# Patient Record
Sex: FEMALE | Race: BLACK OR AFRICAN AMERICAN | Employment: UNEMPLOYED | URBAN - METROPOLITAN AREA
[De-identification: names, ages, dates, MRNs, and addresses within clinical notes are randomized per-mention and may not be internally consistent; named-entity substitution may affect disease eponyms.]

---

## 2017-05-13 ENCOUNTER — APPOINTMENT (OUTPATIENT)
Dept: MRI IMAGING | Age: 37
End: 2017-05-13
Attending: INTERNAL MEDICINE
Payer: SELF-PAY

## 2017-05-13 ENCOUNTER — HOSPITAL ENCOUNTER (OUTPATIENT)
Age: 37
Setting detail: OBSERVATION
Discharge: LEFT BEFORE DISCHARGE | End: 2017-05-13
Attending: EMERGENCY MEDICINE | Admitting: INTERNAL MEDICINE
Payer: SELF-PAY

## 2017-05-13 ENCOUNTER — APPOINTMENT (OUTPATIENT)
Dept: CT IMAGING | Age: 37
End: 2017-05-13
Attending: EMERGENCY MEDICINE
Payer: SELF-PAY

## 2017-05-13 ENCOUNTER — APPOINTMENT (OUTPATIENT)
Dept: GENERAL RADIOLOGY | Age: 37
End: 2017-05-13
Attending: EMERGENCY MEDICINE
Payer: SELF-PAY

## 2017-05-13 VITALS
SYSTOLIC BLOOD PRESSURE: 113 MMHG | TEMPERATURE: 98.1 F | RESPIRATION RATE: 16 BRPM | HEIGHT: 62 IN | HEART RATE: 86 BPM | OXYGEN SATURATION: 98 % | DIASTOLIC BLOOD PRESSURE: 69 MMHG | WEIGHT: 180 LBS | BODY MASS INDEX: 33.13 KG/M2

## 2017-05-13 DIAGNOSIS — H53.9 VISION CHANGES: Primary | ICD-10-CM

## 2017-05-13 DIAGNOSIS — R29.810 FACIAL DROOP: ICD-10-CM

## 2017-05-13 DIAGNOSIS — G45.1 HEMISPHERIC CAROTID ARTERY SYNDROME: ICD-10-CM

## 2017-05-13 LAB
ALBUMIN SERPL BCP-MCNC: 3.6 G/DL (ref 3.5–5)
ALBUMIN/GLOB SERPL: 0.9 {RATIO} (ref 1.1–2.2)
ALP SERPL-CCNC: 70 U/L (ref 45–117)
ALT SERPL-CCNC: 31 U/L (ref 12–78)
AMPHET UR QL SCN: NEGATIVE
ANION GAP BLD CALC-SCNC: 12 MMOL/L (ref 5–15)
APPEARANCE UR: CLEAR
APTT PPP: 26.9 SEC (ref 22.1–32.5)
AST SERPL W P-5'-P-CCNC: 14 U/L (ref 15–37)
BACTERIA URNS QL MICRO: NEGATIVE /HPF
BARBITURATES UR QL SCN: NEGATIVE
BASOPHILS # BLD AUTO: 0 K/UL (ref 0–0.1)
BASOPHILS # BLD: 0 % (ref 0–1)
BENZODIAZ UR QL: NEGATIVE
BILIRUB SERPL-MCNC: 0.2 MG/DL (ref 0.2–1)
BILIRUB UR QL: NEGATIVE
BUN SERPL-MCNC: 6 MG/DL (ref 6–20)
BUN/CREAT SERPL: 8 (ref 12–20)
CALCIUM SERPL-MCNC: 8.9 MG/DL (ref 8.5–10.1)
CANNABINOIDS UR QL SCN: NEGATIVE
CHLORIDE SERPL-SCNC: 108 MMOL/L (ref 97–108)
CHOLEST SERPL-MCNC: 236 MG/DL
CO2 SERPL-SCNC: 22 MMOL/L (ref 21–32)
COCAINE UR QL SCN: NEGATIVE
COLOR UR: NORMAL
CREAT SERPL-MCNC: 0.76 MG/DL (ref 0.55–1.02)
DRUG SCRN COMMENT,DRGCM: NORMAL
EOSINOPHIL # BLD: 0.2 K/UL (ref 0–0.4)
EOSINOPHIL NFR BLD: 4 % (ref 0–7)
EPITH CASTS URNS QL MICRO: NORMAL /LPF
ERYTHROCYTE [DISTWIDTH] IN BLOOD BY AUTOMATED COUNT: 12.6 % (ref 11.5–14.5)
EST. AVERAGE GLUCOSE BLD GHB EST-MCNC: 108 MG/DL
ETHANOL SERPL-MCNC: 113 MG/DL
GLOBULIN SER CALC-MCNC: 4.1 G/DL (ref 2–4)
GLUCOSE BLD STRIP.AUTO-MCNC: 119 MG/DL (ref 65–100)
GLUCOSE SERPL-MCNC: 106 MG/DL (ref 65–100)
GLUCOSE UR STRIP.AUTO-MCNC: NEGATIVE MG/DL
HBA1C MFR BLD: 5.4 % (ref 4.2–6.3)
HCT VFR BLD AUTO: 34.7 % (ref 35–47)
HDLC SERPL-MCNC: 56 MG/DL
HDLC SERPL: 4.2 {RATIO} (ref 0–5)
HGB BLD-MCNC: 11.9 G/DL (ref 11.5–16)
HGB UR QL STRIP: NEGATIVE
INR PPP: 1 (ref 0.9–1.1)
KETONES UR QL STRIP.AUTO: NEGATIVE MG/DL
LDLC SERPL CALC-MCNC: 132.8 MG/DL (ref 0–100)
LEUKOCYTE ESTERASE UR QL STRIP.AUTO: NEGATIVE
LIPID PROFILE,FLP: ABNORMAL
LYMPHOCYTES # BLD AUTO: 24 % (ref 12–49)
LYMPHOCYTES # BLD: 1.4 K/UL (ref 0.8–3.5)
MCH RBC QN AUTO: 30.6 PG (ref 26–34)
MCHC RBC AUTO-ENTMCNC: 34.3 G/DL (ref 30–36.5)
MCV RBC AUTO: 89.2 FL (ref 80–99)
METHADONE UR QL: NEGATIVE
MONOCYTES # BLD: 0.4 K/UL (ref 0–1)
MONOCYTES NFR BLD AUTO: 6 % (ref 5–13)
NEUTS SEG # BLD: 3.9 K/UL (ref 1.8–8)
NEUTS SEG NFR BLD AUTO: 66 % (ref 32–75)
NITRITE UR QL STRIP.AUTO: NEGATIVE
OPIATES UR QL: NEGATIVE
PCP UR QL: NEGATIVE
PH UR STRIP: 5.5 [PH] (ref 5–8)
PLATELET # BLD AUTO: 255 K/UL (ref 150–400)
POTASSIUM SERPL-SCNC: 3.6 MMOL/L (ref 3.5–5.1)
PROT SERPL-MCNC: 7.7 G/DL (ref 6.4–8.2)
PROT UR STRIP-MCNC: NEGATIVE MG/DL
PROTHROMBIN TIME: 10 SEC (ref 9–11.1)
RBC # BLD AUTO: 3.89 M/UL (ref 3.8–5.2)
RBC #/AREA URNS HPF: NORMAL /HPF (ref 0–5)
SERVICE CMNT-IMP: ABNORMAL
SODIUM SERPL-SCNC: 142 MMOL/L (ref 136–145)
SP GR UR REFRACTOMETRY: <1.005 (ref 1–1.03)
THERAPEUTIC RANGE,PTTT: NORMAL SECS (ref 58–77)
TRIGL SERPL-MCNC: 236 MG/DL (ref ?–150)
UA: UC IF INDICATED,UAUC: NORMAL
UROBILINOGEN UR QL STRIP.AUTO: 0.2 EU/DL (ref 0.2–1)
VLDLC SERPL CALC-MCNC: 47.2 MG/DL
WBC # BLD AUTO: 6 K/UL (ref 3.6–11)
WBC URNS QL MICRO: NORMAL /HPF (ref 0–4)

## 2017-05-13 PROCEDURE — 85730 THROMBOPLASTIN TIME PARTIAL: CPT | Performed by: EMERGENCY MEDICINE

## 2017-05-13 PROCEDURE — 80307 DRUG TEST PRSMV CHEM ANLYZR: CPT | Performed by: INTERNAL MEDICINE

## 2017-05-13 PROCEDURE — 81001 URINALYSIS AUTO W/SCOPE: CPT | Performed by: EMERGENCY MEDICINE

## 2017-05-13 PROCEDURE — G8979 MOBILITY GOAL STATUS: HCPCS

## 2017-05-13 PROCEDURE — 97166 OT EVAL MOD COMPLEX 45 MIN: CPT

## 2017-05-13 PROCEDURE — 36415 COLL VENOUS BLD VENIPUNCTURE: CPT | Performed by: EMERGENCY MEDICINE

## 2017-05-13 PROCEDURE — 74011250637 HC RX REV CODE- 250/637: Performed by: PSYCHIATRY & NEUROLOGY

## 2017-05-13 PROCEDURE — 82962 GLUCOSE BLOOD TEST: CPT

## 2017-05-13 PROCEDURE — 70551 MRI BRAIN STEM W/O DYE: CPT

## 2017-05-13 PROCEDURE — 99285 EMERGENCY DEPT VISIT HI MDM: CPT

## 2017-05-13 PROCEDURE — 71010 XR CHEST PORT: CPT

## 2017-05-13 PROCEDURE — G8987 SELF CARE CURRENT STATUS: HCPCS

## 2017-05-13 PROCEDURE — 99218 HC RM OBSERVATION: CPT

## 2017-05-13 PROCEDURE — 80053 COMPREHEN METABOLIC PANEL: CPT | Performed by: EMERGENCY MEDICINE

## 2017-05-13 PROCEDURE — 70450 CT HEAD/BRAIN W/O DYE: CPT

## 2017-05-13 PROCEDURE — 74011250636 HC RX REV CODE- 250/636: Performed by: INTERNAL MEDICINE

## 2017-05-13 PROCEDURE — 70544 MR ANGIOGRAPHY HEAD W/O DYE: CPT

## 2017-05-13 PROCEDURE — 70547 MR ANGIOGRAPHY NECK W/O DYE: CPT

## 2017-05-13 PROCEDURE — 93005 ELECTROCARDIOGRAM TRACING: CPT

## 2017-05-13 PROCEDURE — 85610 PROTHROMBIN TIME: CPT | Performed by: EMERGENCY MEDICINE

## 2017-05-13 PROCEDURE — 83036 HEMOGLOBIN GLYCOSYLATED A1C: CPT | Performed by: INTERNAL MEDICINE

## 2017-05-13 PROCEDURE — 97530 THERAPEUTIC ACTIVITIES: CPT

## 2017-05-13 PROCEDURE — 97162 PT EVAL MOD COMPLEX 30 MIN: CPT

## 2017-05-13 PROCEDURE — 74011250637 HC RX REV CODE- 250/637: Performed by: INTERNAL MEDICINE

## 2017-05-13 PROCEDURE — G8988 SELF CARE GOAL STATUS: HCPCS

## 2017-05-13 PROCEDURE — 80061 LIPID PANEL: CPT | Performed by: INTERNAL MEDICINE

## 2017-05-13 PROCEDURE — G8978 MOBILITY CURRENT STATUS: HCPCS

## 2017-05-13 PROCEDURE — 85025 COMPLETE CBC W/AUTO DIFF WBC: CPT | Performed by: EMERGENCY MEDICINE

## 2017-05-13 PROCEDURE — 97116 GAIT TRAINING THERAPY: CPT

## 2017-05-13 RX ORDER — SODIUM CHLORIDE 0.9 % (FLUSH) 0.9 %
5-10 SYRINGE (ML) INJECTION EVERY 8 HOURS
Status: DISCONTINUED | OUTPATIENT
Start: 2017-05-13 | End: 2017-05-13 | Stop reason: HOSPADM

## 2017-05-13 RX ORDER — BISACODYL 5 MG
5 TABLET, DELAYED RELEASE (ENTERIC COATED) ORAL DAILY PRN
Status: DISCONTINUED | OUTPATIENT
Start: 2017-05-13 | End: 2017-05-13 | Stop reason: HOSPADM

## 2017-05-13 RX ORDER — LOVASTATIN 10 MG/1
10 TABLET ORAL
Qty: 30 TAB | Refills: 1 | Status: SHIPPED | OUTPATIENT
Start: 2017-05-13 | End: 2017-08-14 | Stop reason: CLARIF

## 2017-05-13 RX ORDER — BUTALBITAL, ACETAMINOPHEN AND CAFFEINE 50; 325; 40 MG/1; MG/1; MG/1
1 TABLET ORAL
Status: DISCONTINUED | OUTPATIENT
Start: 2017-05-13 | End: 2017-05-13 | Stop reason: HOSPADM

## 2017-05-13 RX ORDER — LISINOPRIL 10 MG/1
10 TABLET ORAL DAILY
Qty: 30 TAB | Refills: 1 | Status: SHIPPED | OUTPATIENT
Start: 2017-05-13 | End: 2017-08-14 | Stop reason: CLARIF

## 2017-05-13 RX ORDER — ACETAMINOPHEN 650 MG/1
650 SUPPOSITORY RECTAL
Status: DISCONTINUED | OUTPATIENT
Start: 2017-05-13 | End: 2017-05-13 | Stop reason: HOSPADM

## 2017-05-13 RX ORDER — IBUPROFEN 600 MG/1
600 TABLET ORAL
Status: DISCONTINUED | OUTPATIENT
Start: 2017-05-13 | End: 2017-05-13

## 2017-05-13 RX ORDER — LABETALOL HYDROCHLORIDE 5 MG/ML
10 INJECTION, SOLUTION INTRAVENOUS
Status: DISCONTINUED | OUTPATIENT
Start: 2017-05-13 | End: 2017-05-13 | Stop reason: HOSPADM

## 2017-05-13 RX ORDER — ATORVASTATIN CALCIUM 20 MG/1
20 TABLET, FILM COATED ORAL
Status: DISCONTINUED | OUTPATIENT
Start: 2017-05-13 | End: 2017-05-13 | Stop reason: HOSPADM

## 2017-05-13 RX ORDER — ONDANSETRON 2 MG/ML
4 INJECTION INTRAMUSCULAR; INTRAVENOUS
Status: DISCONTINUED | OUTPATIENT
Start: 2017-05-13 | End: 2017-05-13 | Stop reason: HOSPADM

## 2017-05-13 RX ORDER — GUAIFENESIN 100 MG/5ML
81 LIQUID (ML) ORAL DAILY
Status: DISCONTINUED | OUTPATIENT
Start: 2017-05-13 | End: 2017-05-13 | Stop reason: HOSPADM

## 2017-05-13 RX ORDER — ACETAMINOPHEN 325 MG/1
650 TABLET ORAL
Status: DISCONTINUED | OUTPATIENT
Start: 2017-05-13 | End: 2017-05-13 | Stop reason: HOSPADM

## 2017-05-13 RX ORDER — SODIUM CHLORIDE 0.9 % (FLUSH) 0.9 %
5-10 SYRINGE (ML) INJECTION AS NEEDED
Status: DISCONTINUED | OUTPATIENT
Start: 2017-05-13 | End: 2017-05-13 | Stop reason: HOSPADM

## 2017-05-13 RX ORDER — ENOXAPARIN SODIUM 100 MG/ML
40 INJECTION SUBCUTANEOUS DAILY
Status: DISCONTINUED | OUTPATIENT
Start: 2017-05-13 | End: 2017-05-13 | Stop reason: HOSPADM

## 2017-05-13 RX ADMIN — ENOXAPARIN SODIUM 40 MG: 40 INJECTION SUBCUTANEOUS at 14:02

## 2017-05-13 RX ADMIN — BUTALBITAL, ACETAMINOPHEN AND CAFFEINE 1 TABLET: 50; 325; 40 TABLET ORAL at 14:01

## 2017-05-13 RX ADMIN — Medication 10 ML: at 14:01

## 2017-05-13 RX ADMIN — ASPIRIN 81 MG 81 MG: 81 TABLET ORAL at 16:52

## 2017-05-13 RX ADMIN — BUTALBITAL, ACETAMINOPHEN AND CAFFEINE 1 TABLET: 50; 325; 40 TABLET ORAL at 02:56

## 2017-05-13 NOTE — H&P
Hospitalist Admission Note    NAME: Michaela Maguire   :  1980   MRN:  [id number]     Date/Time:  2017 1:47 AM    Patient PCP: PROVIDER UNKNOWN  ________________________________________________________________________    My assessment of this patient's clinical condition and my plan of care is as follows. Assessment / Plan:  Complex migraine vs TIA in setting of drinking wine tonight:  - CT head normal noncontrast head CT  - check MRI/MRA head and neck  - feel echo can be deferred if MRI is normal as this is likely related to EtOH and HA  - check lipids in AM  - Pt with NSAID allergy, will await neuro recommendations regarding necessity of antiplatelet agent  - neuro consulted  - prn fioricet for HA tonight (this is her main complaint)  - PT/OT  Hypertension, benign/essential:  - has been off HCTZ since January  - will not restart med, prn labetalol only per protocol tonight  Sarcoidosis of lymph nodes:  Clear lungs on imaging, but does have documented LAD on CT scans. No hypercalcemia. - CXR negative tonight, but prior CTs with LAD  - off prednisone since January  - feel she needs to return to rheumatology to consider further treatment (current barrier is insurance but she is in 90 days waiting period with new job and this will resolve soon)  Obesity    Code Status: Full  Surrogate Decision Maker:   DVT Prophylaxis: lovenox        Subjective:   CHIEF COMPLAINT:  L facial tightness, tingling, HA    HISTORY OF PRESENT ILLNESS:     Shiela Livingston is a 29 y.o.  female who presents with above. Pt very tearful in the ER, endorses overall poor health for years. She has seen many doctors, eventually noted to have diffuse LAD and had groin biopsies with granulomas so was felt to have sarcoidosis of lymph nodes. She also has nonhealing tatoos on her body.   She was started on prednisone last year and took it for about 6 months, but lost her insurance so stopped all meds in January. She endorses headaches for the last week off and on which she attributed to being off her blood pressure medication. Tonight her HA was severe over her entire frontal area across the top of her scalp and to her posterior head. She was drinking and playing cards with family (~2 glasses of wine). She felt like she needed to urinate a lot and kept going to the bathroom. Finally when she was in the bathroom her L face began feeling tingly. She thinks she had a slight L facial droop and it felt like there was cotton her her L mouth. Her L arm was slightly week with a sensation like being squeezed by a rubber band. No LE weakness. No CP. She says she is SOB all the time from her sarcoid. She is not sure if she had recent fevers. No URI sx. She has both diarrhea and constipation \"all the time. \"  Her sx have improved since coming to the ER. We were asked to admit for work up and evaluation of the above problems. Past Medical History:   Diagnosis Date    Anemia     Headache     Hypertension     Ill-defined condition     sarcoid    PUD (peptic ulcer disease) 2002    resolved        Past Surgical History:   Procedure Laterality Date    HX GYN      Left ovary and fallopian tube removed, D&C, tubal ligation    HX OTHER SURGICAL  2014    Endometrial Ablation, Dr. Chelsea Etienne HX OTHER SURGICAL  8/2015    lymph node removal    HX OTHER SURGICAL Left 8/27/15    excision of left groin lymph node.     HX TUBAL LIGATION  2005    Dr. Parmjit Jon       Social History   Substance Use Topics    Smoking status: Current Every Day Smoker     Packs/day: 0.50     Years: 15.00    Smokeless tobacco: Not on file    Alcohol use Yes      Comment: occasionally        Family History   Problem Relation Age of Onset    Hypertension Mother     Cancer Maternal Grandfather      stomach     Allergies   Allergen Reactions    Latex Hives    Motrin [Ibuprofen] Hives     Allergic just to the coating of brand name Motrin, ok to take generic    Tramadol Rash        Prior to Admission medications    Medication Sig Start Date End Date Taking? Authorizing Provider   predniSONE (DELTASONE) 20 mg tablet Use 3 pills once daily x 3 days, then 2 1/2 pills once daily x 3 days, then 2 pills daily x 3 days, then 1 pill daily x 3 days, then 1/2 pill daily x 3 days. 10/14/16   YANA De Dios   hydrOXYzine (ATARAX) 50 mg tablet Take 1 Tab by mouth every six (6) hours as needed for Itching for up to 20 doses. 10/14/16   YANA De Dios   HYDROcodone-acetaminophen (NORCO) 5-325 mg per tablet Take 1 Tab by mouth every four (4) hours as needed for Pain. Max Daily Amount: 6 Tabs. 9/17/15   Dennis Bermudez MD   ibuprofen (MOTRIN) 600 mg tablet Take 1 Tab by mouth every six (6) hours as needed for Pain. 9/17/15   Dennis Bermudez MD   oxyCODONE-acetaminophen (PERCOCET) 5-325 mg per tablet Take 1-2 Tabs by mouth every four (4) hours as needed for Pain. Max Daily Amount: 12 Tabs. 9/13/15   Berna Lackey MD       REVIEW OF SYSTEMS:     I am not able to complete the review of systems because:    The patient is intubated and sedated    The patient has altered mental status due to his acute medical problems    The patient has baseline aphasia from prior stroke(s)    The patient has baseline dementia and is not reliable historian    The patient is in acute medical distress and unable to provide information           Total of 12 systems reviewed as follows:       POSITIVE= underlined text  Negative = text not underlined  General:  fever, chills, sweats, generalized weakness, weight loss/gain,      loss of appetite   Eyes:    blurred vision, eye pain, loss of vision, double vision  ENT:    rhinorrhea, pharyngitis   Respiratory:   cough, sputum production, SOB, PANDYA, wheezing, pleuritic pain   Cardiology:   chest pain, palpitations, orthopnea, PND, edema, syncope   Gastrointestinal:  abdominal pain , N/V, diarrhea, dysphagia, constipation, bleeding   Genitourinary:  frequency, urgency, dysuria, hematuria, incontinence   Muskuloskeletal :  arthralgia, myalgia, back pain  Hematology:  easy bruising, nose or gum bleeding, lymphadenopathy   Dermatological: rash, ulceration, pruritis, color change / jaundice  Endocrine:   hot flashes or polydipsia   Neurological:  headache, dizziness, confusion, focal weakness, paresthesia,     Speech difficulties, memory loss, gait difficulty  Psychological: Feelings of anxiety, depression, agitation    Objective:   VITALS:    Visit Vitals    /72    Pulse 95    Resp 17    Ht 5' 2\" (1.575 m)    Wt 81.6 kg (180 lb)    SpO2 99%    BMI 32.92 kg/m2       PHYSICAL EXAM:    General:    Alert, cooperative, no distress, appears stated age. HEENT: Atraumatic, anicteric sclerae, pink conjunctivae     No oral ulcers, mucosa moist, throat clear, dentition fair  Neck:  Supple, symmetrical,  thyroid: non tender  Lungs:   Clear to auscultation bilaterally. No Wheezing or Rhonchi. No rales. Chest wall:  No tenderness  No Accessory muscle use. Heart:   Regular  rhythm,  No  murmur   No edema  Abdomen:   Soft, non-tender. Not distended. Bowel sounds normal  Extremities: No cyanosis. No clubbing,      Skin turgor normal, Capillary refill normal, Radial dial pulse 2+  Skin:     Not pale. Not Jaundiced  No rashes   Psych:  Some insight. Not depressed. Not anxious or agitated. Tearful, frustrated. Neurologic: EOMs intact. No facial asymmetry. No aphasia or slurred speech. Symmetrical strength, Sensation grossly intact.  Alert and oriented X 4.     _______________________________________________________________________  Care Plan discussed with:    Comments   Patient x    Family  x multiple   RN x    Care Manager                    Consultant:      _______________________________________________________________________  Expected  Disposition:   Home with Family x   HH/PT/OT/RN    SNF/LTC    [facility] ________________________________________________________________________  TOTAL TIME:  60 Minutes    Critical Care Provided     Minutes non procedure based      Comments    x Reviewed previous records   >50% of visit spent in counseling and coordination of care x Discussion with patient and/or family and questions answered       ________________________________________________________________________  Signed: Ronald Hendricks MD    Procedures: see electronic medical records for all procedures/Xrays and details which were not copied into this note but were reviewed prior to creation of Plan. LAB DATA REVIEWED:    Recent Results (from the past 24 hour(s))   GLUCOSE, POC    Collection Time: 05/13/17 12:51 AM   Result Value Ref Range    Glucose (POC) 119 (H) 65 - 100 mg/dL    Performed by Silvia Gillespie    CBC WITH AUTOMATED DIFF    Collection Time: 05/13/17  1:09 AM   Result Value Ref Range    WBC 6.0 3.6 - 11.0 K/uL    RBC 3.89 3.80 - 5.20 M/uL    HGB 11.9 11.5 - 16.0 g/dL    HCT 34.7 (L) 35.0 - 47.0 %    MCV 89.2 80.0 - 99.0 FL    MCH 30.6 26.0 - 34.0 PG    MCHC 34.3 30.0 - 36.5 g/dL    RDW 12.6 11.5 - 14.5 %    PLATELET 463 039 - 474 K/uL    NEUTROPHILS 66 32 - 75 %    LYMPHOCYTES 24 12 - 49 %    MONOCYTES 6 5 - 13 %    EOSINOPHILS 4 0 - 7 %    BASOPHILS 0 0 - 1 %    ABS. NEUTROPHILS 3.9 1.8 - 8.0 K/UL    ABS. LYMPHOCYTES 1.4 0.8 - 3.5 K/UL    ABS. MONOCYTES 0.4 0.0 - 1.0 K/UL    ABS. EOSINOPHILS 0.2 0.0 - 0.4 K/UL    ABS.  BASOPHILS 0.0 0.0 - 0.1 K/UL   PROTHROMBIN TIME + INR    Collection Time: 05/13/17  1:09 AM   Result Value Ref Range    INR 1.0 0.9 - 1.1      Prothrombin time 10.0 9.0 - 04.7 sec   METABOLIC PANEL, COMPREHENSIVE    Collection Time: 05/13/17  1:09 AM   Result Value Ref Range    Sodium 142 136 - 145 mmol/L    Potassium 3.6 3.5 - 5.1 mmol/L    Chloride 108 97 - 108 mmol/L    CO2 22 21 - 32 mmol/L    Anion gap 12 5 - 15 mmol/L    Glucose 106 (H) 65 - 100 mg/dL    BUN 6 6 - 20 MG/DL    Creatinine 0. 76 0.55 - 1.02 MG/DL    BUN/Creatinine ratio 8 (L) 12 - 20      GFR est AA >60 >60 ml/min/1.73m2    GFR est non-AA >60 >60 ml/min/1.73m2    Calcium 8.9 8.5 - 10.1 MG/DL    Bilirubin, total 0.2 0.2 - 1.0 MG/DL    ALT (SGPT) 31 12 - 78 U/L    AST (SGOT) 14 (L) 15 - 37 U/L    Alk.  phosphatase 70 45 - 117 U/L    Protein, total 7.7 6.4 - 8.2 g/dL    Albumin 3.6 3.5 - 5.0 g/dL    Globulin 4.1 (H) 2.0 - 4.0 g/dL    A-G Ratio 0.9 (L) 1.1 - 2.2     PTT    Collection Time: 05/13/17  1:09 AM   Result Value Ref Range    aPTT 26.9 22.1 - 32.5 sec    aPTT, therapeutic range     58.0 - 77.0 SECS

## 2017-05-13 NOTE — PROGRESS NOTES
Problem: Mobility Impaired (Adult and Pediatric)  Goal: *Acute Goals and Plan of Care (Insert Text)  Physical Therapy Goals  Initiated 5/13/2017  1. Patient will move from supine to sit and sit to supine , scoot up and down and roll side to side in bed with independence within 7 day(s). 2. Patient will transfer from bed to chair and chair to bed with independence using the least restrictive device within 7 day(s). 3. Patient will perform sit to stand with independence within 7 day(s). 4. Patient will ambulate with independence for 200 feet with the least restrictive device within 7 day(s). 5. Patient will ascend/descend 5 stairs with 0 handrail(s) with modified independence within 7 day(s). 6. Patient will improve NJ balance test score by 7 points within 7 days as to demonstrate improved standing balance. PHYSICAL THERAPY EVALUATION- NEURO POPULATION     Patient: Barrett Sam (89 y.o. female)  Date: 5/13/2017  Primary Diagnosis: TIA        Precautions:  Fall      ASSESSMENT :  Based on the objective data described below, the patient presents with impaired functional mobility secondary to LUE/LE weakness, L facial droop with L eyelid droop, impaired standing and sitting balance, impaired gait, and decreased activity tolerance following admission for TIA. CT negative and MRI to be completed this date. Pt is independent for mobility at baseline and lives with family in one level home. Pt received supine in bed with family present and agreeable to PT evaluation. Pt cleared by nursing for mobility. Pt performed bed mobility with SBA, requiring additional time to complete transfers. Pt performed transfers with CGA. Pt scored 32/56 on NJ balance test indicating moderate fall risk. Pt mobilized to/from the bathroom with OT and then ambulated additional 80' with CGA, demonstrating slow gait speed, mild path deviations, decreased arm swing, and increased trunk sway. Pt required occasional min A for mild LOB. Transport present to take patient for testing, therefore evaluation terminated and pt assisted onto stretcher. Pt will require BEFAST education during next therapy session as evaluation terminated early. Pt was left with all needs met, nursing informed, and transport present. Recommend patient discharge to inpatient rehab vs OP neuro PT pending progress in acute PT. Patient will continue to benefit from skilled acute PT to address impairments listed above. Patient will benefit from skilled intervention to address the above impairments. Patients rehabilitation potential is considered to be Good  Factors which may influence rehabilitation potential include:   [ ]           None noted  [ ]           Mental ability/status  [X]           Medical condition  [ ]           Home/family situation and support systems  [ ]           Safety awareness  [ ]           Pain tolerance/management  [ ]           Other:        PLAN :  Recommendations and Planned Interventions:  [X]             Bed Mobility Training             [X]      Neuromuscular Re-Education  [X]             Transfer Training                   [ ]      Orthotic/Prosthetic Training  [X]             Gait Training                         [ ]      Modalities  [X]             Therapeutic Exercises           [ ]      Edema Management/Control  [X]             Therapeutic Activities            [X]      Patient and Family Training/Education  [ ]             Other (comment):  Frequency/Duration: Patient will be followed by physical therapy 5 times a week to address goals. Discharge Recommendations: Inpatient Rehab vs. Outpatient Neuro PT, To Be Determined  Further Equipment Recommendations for Discharge: TBD based on progress in acute PT       SUBJECTIVE:   Patient stated That's my daughter.       OBJECTIVE DATA SUMMARY:   HISTORY:    Past Medical History:   Diagnosis Date    Anemia      Headache      Hypertension      Obesity      PUD (peptic ulcer disease) 2002 resolved    Sarcoidosis of lymph nodes ([facility] [address]       Past Surgical History:   Procedure Laterality Date    HX GYN         Left ovary and fallopian tube removed, D&C, tubal ligation    HX OTHER SURGICAL   2014     Endometrial Ablation, Dr. Mino Do HX OTHER SURGICAL   8/2015     lymph node removal    HX OTHER SURGICAL Left 8/27/15     excision of left groin lymph node.  HX TUBAL LIGATION   2005     Dr. Eduardo Jon     Prior Level of Function/Home Situation: pt is independent for mobility at baseline; lives with children and fiance; drives; works at [employer] job; h/o TIA when she was 34 yo; reports one large GLF ~ 1 year ago, falling off of her porch and hitting her head (pt reporting having seizure x 1 following this accident)  Personal factors and/or comorbidities impacting plan of care: HTN; h/o TIA     Home Situation  Home Environment: Private residence  # Steps to Enter: 5  Rails to Enter: No  Wheelchair Ramp: No  One/Two Story Residence: One story  Living Alone: No  Support Systems: Child(bibi), Spouse/Significant Other/Partner  Patient Expects to be Discharged to[de-identified] Unknown  Current DME Used/Available at Home: Grab bars  Tub or Shower Type: Shower     EXAMINATION/PRESENTATION/DECISION MAKING:   Critical Behavior:  Neurologic State: Alert  Orientation Level: Oriented X4  Cognition: Appropriate safety awareness, Follows commands     Hearing:   Auditory  Auditory Impairment: None  Skin:  Intact  Edema: None  Range Of Motion:  AROM: Generally decreased, functional (LUE/LE, otherwise WFLs)           PROM: Within functional limits           Strength:    Strength: Generally decreased, functional (LUE/LE; LLE grossly 4-/5, otherwise WFLs)                    Tone & Sensation:   Tone: Normal              Sensation: Impaired (Lateral L calf, L foot)               Coordination:  Coordination: Within functional limits  Vision:      Functional Mobility:  Bed Mobility:  Rolling: Stand-by asssistance; Additional time  Supine to Sit: Stand-by asssistance; Additional time     Scooting: Stand-by asssistance; Additional time  Transfers:  Sit to Stand: Contact guard assistance  Stand to Sit: Contact guard assistance                       Balance:   Sitting: Impaired  Sitting - Static: Good (unsupported)  Sitting - Dynamic: Fair (occasional)  Standing: Impaired  Standing - Static: Fair  Standing - Dynamic : Fair  Ambulation/Gait Training:  Distance (ft): 85 Feet (ft)  Assistive Device: Gait belt  Ambulation - Level of Assistance: Contact guard assistance;Assist x1;Additional time (min A for mild LOB)     Gait Description (WDL): Exceptions to WDL  Gait Abnormalities: Decreased step clearance; Path deviations;Trunk sway increased; Antalgic        Base of Support: Widened     Speed/Jamee: Pace decreased (<100 feet/min)  Step Length: Left shortened;Right shortened     Functional Measure  Walters Balance Test:      Sitting to Standin  Standing Unsupported: 3  Sitting with Back Unsupported: 3  Standing to Sittin  Transfers: 1  Standing Unsupported with Eyes Closed: 3  Standing Unsupported with Feet Together: 3  Reach Forward with Outstretched Arm: 2   Object: 3  Turn to Look Over Shoulders: 3  Turn 360 Degrees: 1  Alternate Foot on Step/Stool: 2  Standing Unsupported One Foot in Front: 3  Stand on One Leg: 3  Total: 32             56=Maximum possible score;   0-20=High fall risk  21-40=Moderate fall risk   41-56=Low fall risk      Walters Balance Test and G-code impairment scale:  Percentage of Impairment CH     0%    CI     1-19% CJ     20-39% CK     40-59% CL     60-79% CM     80-99% CN      100%   Walters   Score 0-56 56 45-55 34-44 23-33 12-22 1-11 0         G codes: In compliance with CMSs Claims Based Outcome Reporting, the following G-code set was chosen for this patient based on their primary functional limitation being treated:      The outcome measure chosen to determine the severity of the functional limitation was the NJ with a score of 32/56 which was correlated with the impairment scale. · Mobility - Walking and Moving Around:               - CURRENT STATUS:    CK - 40%-59% impaired, limited or restricted               - GOAL STATUS:           CI - 1%-19% impaired, limited or restricted               - D/C STATUS:                       ---------------To be determined---------------       Physical Therapy Evaluation Charge Determination   History Examination Presentation Decision-Making   MEDIUM  Complexity : 1-2 comorbidities / personal factors will impact the outcome/ POC  HIGH Complexity : 4+ Standardized tests and measures addressing body structure, function, activity limitation and / or participation in recreation  MEDIUM Complexity : Evolving with changing characteristics  Other outcome measures NJ  MEDIUM      Based on the above components, the patient evaluation is determined to be of the following complexity level: MEDIUM     Pain:  Pain Scale 1: Numeric (0 - 10)  Pain Intensity 1: 0  Pain Location 1: Head; Eye           Activity Tolerance:   Fair/good - pt with complaints of fatigue  After treatment:   [ ]     Patient left in no apparent distress sitting up in chair  [X]     Patient left in no apparent distress in bed - stretcher  [X]     Call bell left within reach  [X]     Nursing notified  [X]     Caregiver present  [ ]     Bed alarm activated      COMMUNICATION/EDUCATION:   The patients plan of care was discussed with: Physical Therapist, Occupational Therapist and Registered Nurse. Patient was educated regarding Her deficit(s) of impaired balance, left sided weakness, impaired gait as this relates to Her diagnosis of TIA vs CVA. She demonstrated Good understanding as evidenced by verbalizing understanding. Patient and/or family was NOT verbally educated on the BE FAST acronym for signs/symptoms of CVA and TIA.  BE FAST was written on patient's communication board  for visual education and reinforcement. [X]  Fall prevention education was provided and the patient/caregiver indicated understanding. [X]  Patient/family have participated as able in goal setting and plan of care. [X]  Patient/family agree to work toward stated goals and plan of care. [ ]  Patient understands intent and goals of therapy, but is neutral about his/her participation. [ ]  Patient is unable to participate in goal setting and plan of care.      Thank you for this referral.  Tyesha Ross, PT, DPT   Time Calculation: 26 mins

## 2017-05-13 NOTE — PROGRESS NOTES
Pt wanted to leave AMA. I am concerned given her risk factors for stroke that she needs meds prior to D/C. Discussed she could have had a TIA. Discussed she needs BP and cholesterol meds. Rx given for both of these.  Pt instructed to f/u with PCP as soon as possible once reestablishes insurance    Jesus Taylor MD  5/13/2017  5:56 PM

## 2017-05-13 NOTE — ROUTINE PROCESS
TRANSFER - OUT REPORT:    Verbal report given to Adriana Emerson RN (name) on Doryohan Leak  being transferred to neuro (unit) for routine progression of care       Report consisted of patients Situation, Background, Assessment and   Recommendations(SBAR). Information from the following report(s) SBAR, Kardex, ED Summary, Intake/Output, MAR and Recent Results was reviewed with the receiving nurse. Lines:   Peripheral IV 05/13/17 Right Antecubital (Active)   Site Assessment Clean, dry, & intact 5/13/2017  1:21 AM   Phlebitis Assessment 0 5/13/2017  1:21 AM   Infiltration Assessment 0 5/13/2017  1:21 AM   Dressing Status Clean, dry, & intact 5/13/2017  1:21 AM   Dressing Type Transparent;Tape 5/13/2017  1:21 AM   Hub Color/Line Status Pink 5/13/2017  1:21 AM        Opportunity for questions and clarification was provided.       Patient transported with:   [employer]

## 2017-05-13 NOTE — PROGRESS NOTES
No surgery found *  Bedside and Verbal shift change report given to , (oncoming nurse) by Daisha Villalobos R.N (offgoing nurse). Report included the following information SBAR, Kardex, ED Summary, Intake/Output, MAR, Recent Results and Cardiac Rhythm NSR.     Zone Phone: [phone]        Significant changes during shift:             Patient Information     Inocente Scott  29 y.o.  5/13/2017 12:46 AM by Tony Persaud MD. Inocente Scott was admitted from Home     Problem List          Patient Active Problem List     Diagnosis Date Noted    Sarcoidosis ([facility] [address] 09/17/2015    Adenopathy 08/21/2015           Past Medical History:   Diagnosis Date    Anemia      Headache      Hypertension      Obesity      PUD (peptic ulcer disease) 2002     resolved    Sarcoidosis of lymph nodes ([facility] [address]              Core Measures:     CVA: Yes Yes  CHF:No No  PNA:No No           Activity Status:     OOB to Chair Yes  Ambulated this shift Yes   Bed Rest No     Supplemental O2: (If Applicable)     NC No  NRB No  Venti-mask No  On Liters/min        LINES AND DRAINS:     PIV- 20G RAC-SL.     DVT prophylaxis:     DVT prophylaxis Med- Yes  DVT prophylaxis SCD or ERIC- No      Wounds: (If Applicable)     Wounds- No     Location      Patient Safety:     Falls Score Total Score: 1  Safety Level_______  Bed Alarm On? No  Sitter?  No     Plan for upcoming shift:, Neurology consult           Discharge Plan: Yes after stroke w/u      Active Consults:  IP CONSULT TO NEUROLOGY

## 2017-05-13 NOTE — LETTER
5/13/2017 5:53 PM 
 
Ms. Marck Villareal [facility] To Whom It May Concern: 
 
Marck Villareal was hospitalized at [facility] from 5/12/17 through 5/13/17. She was diagnosed with a transient ischemic attack or TIA. If there are questions or concerns please have the patient contact our office. Sincerely, Nancy Harrell MD

## 2017-05-13 NOTE — PROGRESS NOTES
Hospitalist Progress Note    NAME: Radha Renae   :  1980   MRN:  [id number]       Interim Hospital Summary: 29 y.o. female whom presented on 2017 with      Assessment / Plan:  Complex migraine vs TIA in setting of drinking wine tonight:  - CT head normal noncontrast head CT  - MRI/MRA head and neck pending  - feel echo can be deferred if MRI is normal as this is likely related to EtOH and HA  - Lipids,  236, , HDL 56, .8, start lipitor  - Pt with NSAID allergy, will await neuro recommendations regarding necessity of antiplatelet agent  - neuro consulted  - prn fioricet for HA tonight (this is her main complaint)  - PT/OT  Hypertension, benign/essential:  - has been off HCTZ since January  - will not restart med, prn labetalol only per protocol tonight  Sarcoidosis of lymph nodes: Clear lungs on imaging, but does have documented LAD on CT scans. No hypercalcemia. - CXR negative tonight, but prior CTs with LAD  - off prednisone since January  - feel she needs to return to rheumatology to consider further treatment (current barrier is insurance but she is in 90 days waiting period with new job and this will resolve soon)  Obesity     Code Status: Full  Surrogate Decision Maker:   DVT Prophylaxis: lovenox      Body mass index is 32.92 kg/(m^2). Subjective:     Chief Complaint / Reason for Physician Visit  \"headache is better, lt sided weakness and numbness\". Discussed with RN events overnight. Review of Systems:  Symptom Y/N Comments  Symptom Y/N Comments   Fever/Chills n   Chest Pain n    Poor Appetite y   Edema n    Cough n   Abdominal Pain n    Sputum n   Joint Pain n    SOB/PANDYA n   Pruritis/Rash     Nausea/vomit y Nausea  Tolerating PT/OT     Diarrhea n   Tolerating Diet     Constipation n   Other       Could NOT obtain due to:      Objective:     VITALS:   Last 24hrs VS reviewed since prior progress note.  Most recent are:  Patient Vitals for the past 24 hrs: Temp Pulse Resp BP SpO2   05/13/17 0740 98.7 °F (37.1 °C) 85 16 100/55 -   05/13/17 0414 98.1 °F (36.7 °C) 76 16 118/72 97 %   05/13/17 0300 - 86 15 114/84 98 %   05/13/17 0200 - 86 21 116/70 97 %   05/13/17 0119 - 95 17 - 99 %   05/13/17 0113 - 89 23 119/72 -   05/13/17 0111 - 90 16 119/72 -       Intake/Output Summary (Last 24 hours) at 05/13/17 1208  Last data filed at 05/13/17 1008   Gross per 24 hour   Intake              240 ml   Output                0 ml   Net              240 ml        PHYSICAL EXAM:  General: WD, WN. Alert, cooperative, no acute distress    EENT:  EOMI. Anicteric sclerae. MMM  Resp:  CTA bilaterally, no wheezing or rales. No accessory muscle use  CV:  Regular  rhythm,  No edema  GI:  Soft, Non distended, Non tender.  +Bowel sounds  Neurologic:  Alert and oriented X 3, normal speech, weakness lt side-side, lt facial droop  Psych:   Good insight. Not anxious nor agitated  Skin:  No rashes. No jaundice    Reviewed most current lab test results and cultures  YES  Reviewed most current radiology test results   YES  Review and summation of old records today    NO  Reviewed patient's current orders and MAR    YES  PMH/SH reviewed - no change compared to H&P  ________________________________________________________________________  Care Plan discussed with:    Comments   Patient x    Family  x    RN x    Care Manager     Consultant                        Multidiciplinary team rounds were held today with , nursing, pharmacist and clinical coordinator. Patient's plan of care was discussed; medications were reviewed and discharge planning was addressed.      ________________________________________________________________________  Total NON critical care TIME:  30   Minutes    Total CRITICAL CARE TIME Spent:   Minutes non procedure based      Comments   >50% of visit spent in counseling and coordination of care ________________________________________________________________________  Gemini Pillai MD     Procedures: see electronic medical records for all procedures/Xrays and details which were not copied into this note but were reviewed prior to creation of Plan. LABS:  I reviewed today's most current labs and imaging studies.   Pertinent labs include:  Recent Labs      05/13/17 0109   WBC  6.0   HGB  11.9   HCT  34.7*   PLT  255     Recent Labs      05/13/17 0109   NA  142   K  3.6   CL  108   CO2  22   GLU  106*   BUN  6   CREA  0.76   CA  8.9   ALB  3.6   TBILI  0.2   SGOT  14*   ALT  31   INR  1.0       Signed: Gemini Pillai MD

## 2017-05-13 NOTE — PROGRESS NOTES
Problem: Self Care Deficits Care Plan (Adult)  Goal: *Acute Goals and Plan of Care (Insert Text)  Occupational Therapy Goals  Initiated 5/13/2017  1. Patient will perform upper body dressing with independence within 7 day(s). 2. Patient will perform lower body dressing with independence within 7 day(s). 3. Patient will perform bathing with independence within 7 day(s). 4. Patient will perform toilet transfers with independence within 7 day(s). 5. Patient will perform all aspects of toileting with independence within 7 day(s). 5. Patient will perform the [place] within the next 7 days. OCCUPATIONAL THERAPY EVALUATION  Patient: Marcelino Quintana (71 y.o. female)  Date: 5/13/2017  Primary Diagnosis: TIA        Precautions:   Fall      ASSESSMENT :  Based on the objective data described below, the patient presents with c/o lateral L LE, foot, lip numbness, L sided facial droop with slight ptosis of L eye, decreased L UE/LE strength, and impaired mobility s/p admission for TIA. MRI negative for acute process. Pt reported similar symptoms 8 years ago at age 33 as well as a fall off the [place] x 1 year ago resulting in head trauma and seizure while en route to hospital after fall. This date, pt was SBA for bed mobility, CGA for sit to stand and transfers without AD. Pt demonstrated very slow functional mobility with decreased L foot clearance during mobility but no overt LOB. Pt has 3- to 3/5 L UE strength and pt is L hand dominant. Currently she is needing CGA to min A for ADLs due to weakened dominant L hand. May need IP rehab vs OP pending progress. Eval limited as transport arrived to take pt for MRI. Patient will benefit from skilled intervention to address the above impairments.   Patients rehabilitation potential is considered to be Good  Factors which may influence rehabilitation potential include:   [X]             None noted  [ ]             Mental ability/status  [ ]             Medical condition  [ ]             Home/family situation and support systems  [ ]             Safety awareness  [ ]             Pain tolerance/management  [ ]             Other:        PLAN :  Recommendations and Planned Interventions:  [X]               Self Care Training                  [ ]        Therapeutic Activities  [X]               Functional Mobility Training    [ ]        Cognitive Retraining  [ ]               Therapeutic Exercises           [X]        Endurance Activities  [X]               Balance Training                   [X]        Neuromuscular Re-Education  [ ]               Visual/Perceptual Training     [ ]   Home Safety Training  [X]               Patient Education                 [ ]        Family Training/Education  [ ]               Other (comment):     Frequency/Duration: Patient will be followed by occupational therapy 4 times a week to address goals. Discharge Recommendations: IP vs OP OT pending progress and work up  Further Equipment Recommendations for Discharge: none       SUBJECTIVE:   Patient stated I just want to know what's causing this.       OBJECTIVE DATA SUMMARY:   HISTORY:   Past Medical History:   Diagnosis Date    Anemia      Headache      Hypertension      Obesity      PUD (peptic ulcer disease) 2002     resolved    Sarcoidosis of lymph nodes ([facility] [address]       Past Surgical History:   Procedure Laterality Date    HX GYN         Left ovary and fallopian tube removed, D&C, tubal ligation    HX OTHER SURGICAL   2014     Endometrial Ablation, Dr. Anuradha Cummings HX OTHER SURGICAL   8/2015     lymph node removal    HX OTHER SURGICAL Left 8/27/15     excision of left groin lymph node.  HX TUBAL LIGATION   2005     Dr. Zoila Jon        Prior Level of Function/Home Situation: independent, working, driving. Fall x 1 year ago off deck onto face, reported having seizure while in ambulance after fall.  Pt stated CT can was not performed after her fall but reported no neurological deficits since then. Hx of TIA like symptoms at age 33   Expanded or extensive additional review of patient history:      Home Situation  Home Environment: Private residence  # Steps to Enter: 5  Rails to Enter: No  Wheelchair Ramp: No  One/Two Story Residence: One story  Living Alone: No  Support Systems: Child(bibi), Spouse/Significant Other/Partner  Patient Expects to be Discharged to[de-identified] Unknown  Current DME Used/Available at Home: Grab bars  Tub or Shower Type: Shower  [X]  Right hand dominant             [X]  Left hand dominant     EXAMINATION OF PERFORMANCE DEFICITS:  Cognitive/Behavioral Status:  Neurologic State: Alert  Orientation Level: Oriented X4  Cognition: Appropriate safety awareness; Follows commands              Skin: intact     Edema: none noted     Hearing: Auditory  Auditory Impairment: None     Vision/Perceptual:    Tracking: Able to track stimulus in all quadrants w/o difficulty                                 Range of Motion:     AROM: Generally decreased, functional (LUE/LE, otherwise WFLs)  PROM: Within functional limits                       Strength:     Strength: Generally decreased, functional (LUE/LE; LLE grossly 4-/5, L grasp 3-/5, otherwise WFLs)                 Coordination:  Coordination: Within functional limits  Fine Motor Skills-Upper: Left Intact; Right Intact    Gross Motor Skills-Upper: Left Intact; Right Intact     Tone & Sensation:     Tone: Normal  Sensation: Impaired (Lateral L calf, L foot)                       Balance:  Sitting: Impaired  Sitting - Static: Good (unsupported)  Sitting - Dynamic: Fair (occasional)  Standing: Impaired  Standing - Static: Fair  Standing - Dynamic : Fair     Functional Mobility and Transfers for ADLs:  Bed Mobility:  Rolling: Stand-by asssistance; Additional time  Supine to Sit: Stand-by asssistance; Additional time  Scooting: Stand-by asssistance; Additional time     Transfers:  Sit to Stand: Contact guard assistance  Stand to Sit: Contact guard assistance  Toilet Transfer : Stand-by asssistance     ADL Assessment:  Feeding: Independent     Oral Facial Hygiene/Grooming: Contact guard assistance     Bathing: Minimum assistance     Upper Body Dressing: Minimum assistance (fine motor aspects due to decreased L hand strength)     Lower Body Dressing: Minimum assistance     Toileting: Contact guard assistance                 ADL Intervention and task modifications:   Unable to perform [place] nor educate on BE FAST as transport arrived to take pt for MRI. Will perform next session. Functional Measure:  Barthel Index:      Bathin  Bladder: 10  Bowels: 10  Groomin  Dressin  Feeding: 10  Mobility: 10  Stairs: 0  Toilet Use: 5  Transfer (Bed to Chair and Back): 10  Total: 65         Barthel and G-code impairment scale:  Percentage of impairment CH  0% CI  1-19% CJ  20-39% CK  40-59% CL  60-79% CM  80-99% CN  100%   Barthel Score 0-100 100 99-80 79-60 59-40 20-39 1-19    0   Barthel Score 0-20 20 17-19 13-16 9-12 5-8 1-4 0      The Barthel ADL Index: Guidelines  1. The index should be used as a record of what a patient does, not as a record of what a patient could do. 2. The main aim is to establish degree of independence from any help, physical or verbal, however minor and for whatever reason. 3. The need for supervision renders the patient not independent. 4. A patient's performance should be established using the best available evidence. Asking the patient, friends/relatives and nurses are the usual sources, but direct observation and common sense are also important. However direct testing is not needed. 5. Usually the patient's performance over the preceding 24-48 hours is important, but occasionally longer periods will be relevant. 6. Middle categories imply that the patient supplies over 50 per cent of the effort. 7. Use of aids to be independent is allowed. Marci Ho, Barthel, D.W. (1711).  Functional evaluation: the Barthel Index. [facility] (14)2. [place] Grey, Lluvia Franco., Iveth Fernandez., [place], [address] (1999). Measuring the change indisability after inpatient rehabilitation; comparison of the responsiveness of the Barthel Index and Functional [place] Measure. Journal of Neurology, Neurosurgery, and Psychiatry, 66(4), 744-943. YANN Caraballo, OTF Hastings, & Isaias Zhao M.A. (2004.) Assessment of post-stroke quality of life in cost-effectiveness studies: The usefulness of the Barthel Index and the EuroQoL-5D. Quality of Life Research, 13, 433-17            G codes: In compliance with CMSs Claims Based Outcome Reporting, the following G-code set was chosen for this patient based on their primary functional limitation being treated: The outcome measure chosen to determine the severity of the functional limitation was the Barthel Index with a score of 65/100 which was correlated with the impairment scale. · Self Care:               - CURRENT STATUS:    CJ - 20%-39% impaired, limited or restricted               - GOAL STATUS:           CI - 1%-19% impaired, limited or restricted               - D/C STATUS:                       ---------------To be determined---------------      Occupational Therapy Evaluation Charge Determination   History Examination Decision-Making   MEDIUM Complexity : Expanded review of history including physical, cognitive and psychosocial  history  MEDIUM Complexity : 3-5 performance deficits relating to physical, cognitive , or psychosocial skils that result in activity limitations and / or participation restrictions MEDIUM Complexity : Patient may present with comorbidities that affect occupational performnce.  Miniml to moderate modification of tasks or assistance (eg, physical or verbal ) with assesment(s) is necessary to enable patient to complete evaluation       Based on the above components, the patient evaluation is determined to be of the following complexity level: MEDIUM  Pain:  Pain Scale 1: Numeric (0 - 10)  Pain Intensity 1: 7  Pain Location 1: Head  Pain Orientation 1: Anterior; Left  Pain Description 1: Aching  Pain Intervention(s) 1: Medication (see MAR)  Activity Tolerance:   VSS  Please refer to the flowsheet for vital signs taken during this treatment. After treatment:   [X] Patient left in no apparent distress with transport  [ ] Patient left in no apparent distress in bed  [ ] Call bell left within reach  [X] Nursing notified  [ ] Caregiver present  [ ] Bed alarm activated      COMMUNICATION/EDUCATION:   The patients plan of care was discussed with: Physical Therapist and Registered Nurse. [ ] Home safety education was provided and the patient/caregiver indicated understanding. [X] Patient/family have participated as able in goal setting and plan of care. [X] Patient/family agree to work toward stated goals and plan of care. [ ] Patient understands intent and goals of therapy, but is neutral about his/her participation. [ ] Patient is unable to participate in goal setting and plan of care. This patients plan of care is appropriate for delegation to [facility].      Thank you for this referral.  Darlin Spaulding OT  Time Calculation: 24 mins

## 2017-05-13 NOTE — CONSULTS
NEUROLOGY CONSULTATION    DATE OF CONSULTATION: 5/13/2017    CONSULTED BY: Dr. Elma Santiago: Stroke      HISTORY OF PRESENT ILLNESS  Alyssa Arias is a 29 y.o. female who presented to the emergency room yesterday with complaints of left facial droop and left sided numbness. Patient did also endorse headache at this time. Patient was in her usual state of health having a drink and playing a card game when the episode occurred. She reports a previous episode 7 years ago. At that time she was told she was not having a stroke. It took her some time to recover. But she did fully recover. Her family brought to the emergency room during this acute event. She had a CT of the head that was negative. Tele-neurology was consulted. There was concern for possible vascular event versus stress-induced versus secondary to her sarcoid. Also given her headache, there is a question of complicated migraine. However, patient does not have any significant history of migraine previous to this. Patient was admitted an MRI of the brain was undergone. MRI of the brain and MRA of head and neck were normal.  When I came to see the patient she still had some complaints of numbness on her left face and left toes. Stroke risk factors: Hypertension  Patient was not taking any medications due to being out of health insurance.         PMH  Past Medical History:   Diagnosis Date    Anemia     Headache     Hypertension     Obesity     PUD (peptic ulcer disease) 2002    resolved    [facility]          Social History     Social History    Marital status: LEGALLY      Spouse name: N/A    Number of children: N/A    Years of education: N/A     Social History Main Topics    Smoking status: Current Every Day Smoker     Packs/day: 0.50     Years: 15.00    Smokeless tobacco: None    Alcohol use Yes      Comment: occasionally    Drug use: No    Sexual activity: Not Asked     Other Topics Concern    None     Social History Narrative       FH  Family History   Problem Relation Age of Onset    Hypertension Mother     Cancer Maternal Grandfather      stomach       ALLERGIES  Allergies   Allergen Reactions    Latex Hives    Motrin [Ibuprofen] Hives     Allergic just to the coating of brand name Motrin, ok to take generic    Tramadol Rash       CURRENT MEDS  Current Facility-Administered Medications   Medication Dose Route Frequency Provider Last Rate Last Dose    sodium chloride (NS) flush 5-10 mL  5-10 mL IntraVENous Q8H Jim Goodman MD   10 mL at 05/13/17 1401    sodium chloride (NS) flush 5-10 mL  5-10 mL IntraVENous PRN Jim Goodman MD        ondansetron [facility]) injection 4 mg  4 mg IntraVENous Q6H PRN Jim Goodman MD        labetalol (NORMODYNE;TRANDATE) injection 10 mg  10 mg IntraVENous Q1H PRMOHIT Goodman MD        acetaminophen (TYLENOL) tablet 650 mg  650 mg Oral Q4H PRN Jim Goodman MD        Or    acetaminophen (TYLENOL) solution 650 mg  650 mg Per NG tube Q4H PRN Jim Goodman MD        Or   [facility] acetaminophen (TYLENOL) suppository 650 mg  650 mg Rectal Q4H PRN Jim Goodman MD        bisacodyl (DULCOLAX) tablet 5 mg  5 mg Oral DAILY PRMOHIT Goodman MD        enoxaparin (LOVENOX) injection 40 mg  40 mg SubCUTAneous DAILY Jim Goodman MD   40 mg at 05/13/17 1402    butalbital-acetaminophen-caffeine (FIORICET, ESGIC) -40 mg per tablet 1 Tab  1 Tab Oral Q4H PRMOHIT Goodman MD   1 Tab at 05/13/17 1401    atorvastatin (LIPITOR) tablet 20 mg  20 mg Oral QHS Veronica Mejia MD           ROS  Constitutional: Denies recent weight change, fever, chills, sweats   ENT/Mouth:  Eye:  Denies hearing loss, ringing in the ears,   Denies vision changes,   Cardiovascular:  Respiratory:   Denies chest pain/angina pectoris  Denies shortness of breath, cough, wheezing   Gastrointestinal: Denies nausea, vomiting, constipation, frequent diarrhea,   Musculoskeletal:   Denies joint pain, stiffness/swelling   Integument:   Denies rash/itching   Neurological:  Per HPI   Psychiatric:   Denies anxiety or depression       CLINICAL DATA REVIEW  IMAGING: CT head negative (I personally reviewed these images in PACS and this is my impression)  MRI brain: Negative  MRA head/neck: Negative    TELEMETRY normal sinus rhythm      LABS  Results for orders placed or performed during the hospital encounter of 05/13/17   CBC WITH AUTOMATED DIFF   Result Value Ref Range    WBC 6.0 3.6 - 11.0 K/uL    RBC 3.89 3.80 - 5.20 M/uL    HGB 11.9 11.5 - 16.0 g/dL    HCT 34.7 (L) 35.0 - 47.0 %    MCV 89.2 80.0 - 99.0 FL    MCH 30.6 26.0 - 34.0 PG    MCHC 34.3 30.0 - 36.5 g/dL    RDW 12.6 11.5 - 14.5 %    PLATELET 341 340 - 122 K/uL    NEUTROPHILS 66 32 - 75 %    LYMPHOCYTES 24 12 - 49 %    MONOCYTES 6 5 - 13 %    EOSINOPHILS 4 0 - 7 %    BASOPHILS 0 0 - 1 %    ABS. NEUTROPHILS 3.9 1.8 - 8.0 K/UL    ABS. LYMPHOCYTES 1.4 0.8 - 3.5 K/UL    ABS. MONOCYTES 0.4 0.0 - 1.0 K/UL    ABS. EOSINOPHILS 0.2 0.0 - 0.4 K/UL    ABS. BASOPHILS 0.0 0.0 - 0.1 K/UL   PROTHROMBIN TIME + INR   Result Value Ref Range    INR 1.0 0.9 - 1.1      Prothrombin time 10.0 9.0 - 00.8 sec   METABOLIC PANEL, COMPREHENSIVE   Result Value Ref Range    Sodium 142 136 - 145 mmol/L    Potassium 3.6 3.5 - 5.1 mmol/L    Chloride 108 97 - 108 mmol/L    CO2 22 21 - 32 mmol/L    Anion gap 12 5 - 15 mmol/L    Glucose 106 (H) 65 - 100 mg/dL    BUN 6 6 - 20 MG/DL    Creatinine 0.76 0.55 - 1.02 MG/DL    BUN/Creatinine ratio 8 (L) 12 - 20      GFR est AA >60 >60 ml/min/1.73m2    GFR est non-AA >60 >60 ml/min/1.73m2    Calcium 8.9 8.5 - 10.1 MG/DL    Bilirubin, total 0.2 0.2 - 1.0 MG/DL    ALT (SGPT) 31 12 - 78 U/L    AST (SGOT) 14 (L) 15 - 37 U/L    Alk.  phosphatase 70 45 - 117 U/L    Protein, total 7.7 6.4 - 8.2 g/dL    Albumin 3.6 3.5 - 5.0 g/dL    Globulin 4.1 (H) 2.0 - 4.0 g/dL    A-G Ratio 0.9 (L) 1.1 - 2.2     PTT   Result Value Ref Range    aPTT 26.9 22.1 - 32.5 sec aPTT, therapeutic range     58.0 - 77.0 SECS   URINALYSIS W/ REFLEX CULTURE   Result Value Ref Range    Color YELLOW/STRAW      Appearance CLEAR CLEAR      Specific gravity <1.005 1.003 - 1.030    pH (UA) 5.5 5.0 - 8.0      Protein NEGATIVE  NEG mg/dL    Glucose NEGATIVE  NEG mg/dL    Ketone NEGATIVE  NEG mg/dL    Bilirubin NEGATIVE  NEG      Blood NEGATIVE  NEG      Urobilinogen 0.2 0.2 - 1.0 EU/dL    Nitrites NEGATIVE  NEG      Leukocyte Esterase NEGATIVE  NEG      WBC 0-4 0 - 4 /hpf    RBC 0-5 0 - 5 /hpf    Epithelial cells FEW FEW /lpf    Bacteria NEGATIVE  NEG /hpf    UA:UC IF INDICATED CULTURE NOT INDICATED BY UA RESULT CNI     LIPID PANEL   Result Value Ref Range    LIPID PROFILE          Cholesterol, total 236 (H) <200 MG/DL    Triglyceride 236 (H) <150 MG/DL    HDL Cholesterol 56 MG/DL    LDL, calculated 132.8 (H) 0 - 100 MG/DL    VLDL, calculated 47.2 MG/DL    CHOL/HDL Ratio 4.2 0 - 5.0     HEMOGLOBIN A1C WITH EAG   Result Value Ref Range    Hemoglobin A1c 5.4 4.2 - 6.3 %    Est. average glucose 108 mg/dL   DRUG SCREEN, URINE   Result Value Ref Range    AMPHETAMINE NEGATIVE  NEG      BARBITURATES NEGATIVE  NEG      BENZODIAZEPINE NEGATIVE  NEG      COCAINE NEGATIVE  NEG      METHADONE NEGATIVE  NEG      OPIATES NEGATIVE  NEG      PCP(PHENCYCLIDINE) NEGATIVE  NEG      THC (TH-CANNABINOL) NEGATIVE  NEG      Drug screen comment (NOTE)    ETHYL ALCOHOL   Result Value Ref Range    ALCOHOL(ETHYL),SERUM 113 (H) <10 MG/DL   GLUCOSE, POC   Result Value Ref Range    Glucose (POC) 119 (H) 65 - 100 mg/dL    Performed by Ailin Gustafson    EKG, 12 LEAD, INITIAL   Result Value Ref Range    Ventricular Rate 87 BPM    Atrial Rate 87 BPM    P-R Interval 120 ms    QRS Duration 98 ms    Q-T Interval 376 ms    QTC Calculation (Bezet) 452 ms    Calculated P Axis 72 degrees    Calculated R Axis 40 degrees    Calculated T Axis 51 degrees    Diagnosis       Normal sinus rhythm  Nonspecific T wave abnormality  Abnormal ECG  No previous ECGs available         PHYSICAL EXAM  Visit Vitals    /69    Pulse 86    Temp 98.1 °F (36.7 °C)    Resp 16    Ht 5' 2\" (1.575 m)    Wt 81.6 kg (180 lb)    SpO2 98%    BMI 32.92 kg/m2     General:  Alert, cooperative, no distress. Head:  Normocephalic, without obvious abnormality, atraumatic. Eyes:  Conjunctivae/corneas clear. Pupils equal, round, reactive to light. Extraocular movements intact, VFF, NO papilledema   Lungs:  Heart:   Non labored breathing  Regular rate and rhythm, no carotid bruits   Abdomen:   Soft, non-distended   Extremities: Extremities normal, atraumatic, no cyanosis or edema. Pulses: 2+ and symmetric all extremities. Skin: Skin color, texture, turgor normal. No rashes or lesions. Neurologic:  Gen: Attention normal             Language: naming, repetition, fluency normal             Memory: intact recent and remote memory  Cranial Nerves:  I: smell Not tested   II: visual fields Full to confrontation   II: pupils Equal, round, reactive to light   II: optic disc No papilledema   III,VII: ptosis none   III,IV,VI: extraocular muscles  Full ROM   V: mastication normal   V: facial light touch sensation   decreased on left    VII: facial muscle function   symmetric   VIII: hearing symmetric   IX: soft palate elevation  normal   XI: trapezius strength  5/5   XI: sternocleidomastoid strength 5/5   XI: neck flexion strength  5/5   XII: tongue  midline     Motor: normal bulk and tone, no tremor              Strength: 5/5 on right, 5-/5 distally on left  Sensory: Decreased pinprick left upper extremity  Coordination: FTN intact  Gait: Not assessed  Reflexes: 2+ throughout       IMPRESSION:   This is a 71-year-old female who presented with an episode of headache and left face weakness and numbness as well as some left sided numbness. On exam patient is still exhibiting some of the symptoms. However, MRI was negative.   Patient does have stroke risk factors of hypertension and hyperlipidemia. She does need to be appropriately risk modified for stroke so will undergo this. I am concerned about patient's ability to be compliant due to lack of healthcare and would recommend doing low cost medications for her if possible. This episode could be consistent with TIA or complicated migraine. RECOMMENDATIONS:  1. MRI brain/ MRA head and neck negative as above  2. TTE not done. This can be done as an outpatient  3. Telemetry  4. Good blood pressure control. Patient will need a medication that is inexpensive due to not having drug coverage. Advised patient there are medications on the [employer] $4 list that can be prescribed for her  5. Stroke labs (HgbA1c, TSH, lipid panel) as above  6. Start ASA 81mg daily  7. Start statin as LDL is not at goal  8. ST/OT/PT eval ordered and will assess patient for rehab  9. Discussed personal risk factors for stroke including: Hypertension and hyperlipidemia  10. Discussed signs and symptoms of stroke and when to alert 911  11. VTE prophylaxis: Lovenox    Thank you very much for this consultation. No further neurologic recommendations at this time. Will sign off but please call with questions.     Denzel Pabon MD

## 2017-05-13 NOTE — PROGRESS NOTES
* No surgery found *  Bedside and Verbal shift change report given to [address] [place] [address], (oncoming nurse) by Liya (offgoing nurse). Report included the following information SBAR, Kardex, ED Summary, Intake/Output, MAR, Recent Results and Cardiac Rhythm NSR. Zone Phone:   [phone]      Significant changes during shift:  Pt not very cooperative upon arrival to unit. Pt just wanting to sleep. Able to give stroke education, NIH, and complete MRI screening sheet with assistance from family. Pt with very slight slurred speech and delayed responses. Slight left facial droop. Patient Information    Marcelino Quintana  29 y.o.  5/13/2017 12:46 AM by Patricio Maldonado MD. Marcelino Quintana was admitted from Home    Problem List    Patient Active Problem List    Diagnosis Date Noted    Sarcoidosis [facility]) 09/17/2015    Adenopathy 08/21/2015     Past Medical History:   Diagnosis Date    Anemia     Headache     Hypertension     Obesity     PUD (peptic ulcer disease) 2002    resolved    [facility]arc[facility]osis of [facility] nodes ([facility]          Core Measures:    CVA: Yes Yes  CHF:No No  PNA:No No        Activity Status:    OOB to Chair Yes  Ambulated this shift Yes   Bed Rest No    Supplemental O2: (If Applicable)    NC No  NRB No  Venti-mask No  On  Liters/min      LINES AND DRAINS:    PIV- 20G RAC-SL.    DVT prophylaxis:    DVT prophylaxis Med- Yes  DVT prophylaxis SCD or ERIC- No     Wounds: (If Applicable)    Wounds- No    Location     Patient Safety:    Falls Score Total Score: 1  Safety Level_______  Bed Alarm On? No  Sitter?  No    Plan for upcoming shift: MRI, Neurology consult        Discharge Plan: Yes after stroke w/u     Active Consults:  IP CONSULT TO NEUROLOGY

## 2017-05-13 NOTE — ED NOTES
No apparent distress. Pt sleeping, able to arouse by voice. Attempted to perform STAND assessment in order to give patient PO pain medications. Pt refused and asked to be left to sleep. Family remains at bedside.

## 2017-05-13 NOTE — ED PROVIDER NOTES
HPI Comments: Gokul Navarrete is a 29 y.o. female with PMHx significant for HTN, anemia, Left sided CVA who presents ambulatory to [facility] ED for evaluation of possible CVA with symptoms noticed about 45 minutes to an hour PTA. Per  pt was sitting at a table playing cards and had 2 glasses of wine before going to the bathroom, when she got out of the bathroom pt c/o numbness over the left cheek, left eye drooping, and general pain over the face. Per  pt has had trouble seeing straight but can see shadows from the sides of her eyes. Per  pt's arm was weak during the onset of her symptoms. Pt does have a hx of CVA when she was 33year old but  denies any recidual deficits from the prior CVA. Pt specifically denies any CP, SOB, gait problems, HA, seizures. PCP: PROVIDER UNKNOWN    Social Hx: + tobacco (0.5 ppd), +EtOH (occasionally), - illicit drugs (-). There are no other complaints, changes or physical findings at this time. The history is provided by the patient and the spouse. No  was used. Past Medical History:   Diagnosis Date    Anemia     Headache     Hypertension     Obesity     PUD (peptic ulcer disease) 2002    resolved    Sarcoidosis of lymph nodes ([facility] [address]        Past Surgical History:   Procedure Laterality Date    HX GYN      Left ovary and fallopian tube removed, D&C, tubal ligation    HX OTHER SURGICAL  2014    Endometrial Ablation, Dr. Elfego Doran HX OTHER SURGICAL  8/2015    lymph node removal    HX OTHER SURGICAL Left 8/27/15    excision of left groin lymph node.     HX TUBAL LIGATION  2005    Dr. Ximena Jon         Family History:   Problem Relation Age of Onset    Hypertension Mother     Cancer Maternal Grandfather      stomach       Social History     Social History    Marital status: LEGALLY      Spouse name: N/A    Number of children: N/A    Years of education: N/A     Occupational History  Not on file. Social History Main Topics    Smoking status: Current Every Day Smoker     Packs/day: 0.50     Years: 15.00    Smokeless tobacco: Not on file    Alcohol use Yes      Comment: occasionally    Drug use: No    Sexual activity: Not on file     Other Topics Concern    Not on file     Social History Narrative         ALLERGIES: Latex; Motrin [ibuprofen]; and Tramadol    Review of Systems   Constitutional: Negative for activity change, appetite change, fatigue and fever. HENT: Negative for congestion, rhinorrhea and sore throat. (+) pain over the face   Eyes: Positive for visual disturbance. Respiratory: Negative. Negative for cough, shortness of breath and wheezing. Cardiovascular: Negative. Negative for chest pain and leg swelling. Gastrointestinal: Negative. Negative for abdominal distention, abdominal pain, constipation, diarrhea, nausea and vomiting. Endocrine: Negative. Genitourinary: Negative for difficulty urinating, dysuria, menstrual problem, vaginal bleeding and vaginal discharge. Musculoskeletal: Negative. Negative for arthralgias, gait problem, joint swelling and myalgias. Skin: Negative. Negative for rash. Neurological: Positive for weakness (left arm) and numbness. Negative for dizziness, seizures, syncope, light-headedness and headaches. Psychiatric/Behavioral: Negative. Vitals:    05/13/17 0119 05/13/17 0200 05/13/17 0300 05/13/17 0414   BP:  116/70 114/84 118/72   Pulse: 95 86 86 76   Resp: 17 21 15 16   Temp:    98.1 °F (36.7 °C)   SpO2: 99% 97% 98% 97%   Weight:       Height:                Physical Exam   Constitutional: She appears well-developed and well-nourished. HENT:   Head: Atraumatic. Eyes: EOM are normal. Pupils are equal, round, and reactive to light. Cardiovascular: Normal rate, regular rhythm, normal heart sounds and intact distal pulses. Exam reveals no gallop and no friction rub.     No murmur heard.  Pulmonary/Chest: Effort normal and breath sounds normal. No respiratory distress. She has no wheezes. She has no rales. She exhibits no tenderness. Abdominal: Soft. Bowel sounds are normal. She exhibits no distension and no mass. There is no tenderness. There is no rebound and no guarding. Musculoskeletal: Normal range of motion. She exhibits no edema or tenderness. Neurological:   Mild facial droop left  No pronator drift  No speech abnormalities  Slow to respond  No focal weakness in UE/LE   Skin: Skin is warm. Psychiatric:   tearful   Nursing note and vitals reviewed. MDM  Number of Diagnoses or Management Options  Diagnosis management comments: CVA, TIA, COMPLEX MIGRAINE- WILL CODE NEURO GIVEN ONSET OF SYMPTOMS AND PERSISTENCE. Amount and/or Complexity of Data Reviewed  Clinical lab tests: ordered and reviewed  Tests in the radiology section of CPT®: ordered and reviewed  Tests in the medicine section of CPT®: ordered and reviewed      ED Course       Procedures    EKG interpretation: (Preliminary)  01:12  Rhythm: normal sinus rhythm; and regular . Rate (approx.): 87; Axis: normal; CA interval: normal; QRS interval: normal ; ST/T wave: non-specific T wave abnormality; Other findings: abnormal ekg. Written by EMRE Jean as dictated by Robert Patterson MD    CONSULT NOTE:   1:13 AM  Robert Patterson MD spoke with Dr. Moshe Manley,   Specialty: Neurology  Discussed pt's hx, disposition, and available diagnostic and imaging results. Reviewed care plans. Consultant agrees with plans as outlined. He will evaluate the pt through tele-neuro. Written by EMRE Jean, as dictated by Robert Patterson MD.    PROGRESS NOTE:  1:36 AM  Dr. Lilia Bella finished speaking with pt and recommends admitting pt for stroke workup, an pt is not a TPA candidate.   Written by EMRE Jean, as dictated by Robert Patterson MD.    CONSULT NOTE:   1:47 AM  Robert Patterson MD spoke with Dr. Joseph Hong,   Specialty: Hospitalist  Discussed pt's hx, disposition, and available diagnostic and imaging results. Reviewed care plans. Consultant will evaluate pt for admission. Written by EMRE Jean, as dictated by Robert Patterson MD.    PROGRESS NOTE:  2:50 AM  Per Dr. Joseph Hong the pt does not want to be admitted, will go and speak with her. Written by EMRE Jean, as dictated by Robert Patterson MD.    PROGRESS NOTE:  3:06 AM  After speaking with the pt she has agreed to be admitted. Pt now fully alert, conversant, no facial droop or weakness-- making this more likely a complex migraine. Family and patient do endorse significant recent stressors. Will move forward with admission to r/o CVA/TIA. Written by EMRE Jean, as dictated by Robert Patterson MD.    LABORATORY TESTS:  Recent Results (from the past 12 hour(s))   GLUCOSE, POC    Collection Time: 05/13/17 12:51 AM   Result Value Ref Range    Glucose (POC) 119 (H) 65 - 100 mg/dL    Performed by Chelsea Myles    CBC WITH AUTOMATED DIFF    Collection Time: 05/13/17  1:09 AM   Result Value Ref Range    WBC 6.0 3.6 - 11.0 K/uL    RBC 3.89 3.80 - 5.20 M/uL    HGB 11.9 11.5 - 16.0 g/dL    HCT 34.7 (L) 35.0 - 47.0 %    MCV 89.2 80.0 - 99.0 FL    MCH 30.6 26.0 - 34.0 PG    MCHC 34.3 30.0 - 36.5 g/dL    RDW 12.6 11.5 - 14.5 %    PLATELET 680 738 - 618 K/uL    NEUTROPHILS 66 32 - 75 %    LYMPHOCYTES 24 12 - 49 %    MONOCYTES 6 5 - 13 %    EOSINOPHILS 4 0 - 7 %    BASOPHILS 0 0 - 1 %    ABS. NEUTROPHILS 3.9 1.8 - 8.0 K/UL    ABS. LYMPHOCYTES 1.4 0.8 - 3.5 K/UL    ABS. MONOCYTES 0.4 0.0 - 1.0 K/UL    ABS. EOSINOPHILS 0.2 0.0 - 0.4 K/UL    ABS.  BASOPHILS 0.0 0.0 - 0.1 K/UL   PROTHROMBIN TIME + INR    Collection Time: 05/13/17  1:09 AM   Result Value Ref Range    INR 1.0 0.9 - 1.1      Prothrombin time 10.0 9.0 - 70.8 sec   METABOLIC PANEL, COMPREHENSIVE    Collection Time: 05/13/17  1:09 AM   Result Value Ref Range Sodium 142 136 - 145 mmol/L    Potassium 3.6 3.5 - 5.1 mmol/L    Chloride 108 97 - 108 mmol/L    CO2 22 21 - 32 mmol/L    Anion gap 12 5 - 15 mmol/L    Glucose 106 (H) 65 - 100 mg/dL    BUN 6 6 - 20 MG/DL    Creatinine 0.76 0.55 - 1.02 MG/DL    BUN/Creatinine ratio 8 (L) 12 - 20      GFR est AA >60 >60 ml/min/1.73m2    GFR est non-AA >60 >60 ml/min/1.73m2    Calcium 8.9 8.5 - 10.1 MG/DL    Bilirubin, total 0.2 0.2 - 1.0 MG/DL    ALT (SGPT) 31 12 - 78 U/L    AST (SGOT) 14 (L) 15 - 37 U/L    Alk.  phosphatase 70 45 - 117 U/L    Protein, total 7.7 6.4 - 8.2 g/dL    Albumin 3.6 3.5 - 5.0 g/dL    Globulin 4.1 (H) 2.0 - 4.0 g/dL    A-G Ratio 0.9 (L) 1.1 - 2.2     PTT    Collection Time: 05/13/17  1:09 AM   Result Value Ref Range    aPTT 26.9 22.1 - 32.5 sec    aPTT, therapeutic range     58.0 - 77.0 SECS   URINALYSIS W/ REFLEX CULTURE    Collection Time: 05/13/17  1:44 AM   Result Value Ref Range    Color YELLOW/STRAW      Appearance CLEAR CLEAR      Specific gravity <1.005 1.003 - 1.030    pH (UA) 5.5 5.0 - 8.0      Protein NEGATIVE  NEG mg/dL    Glucose NEGATIVE  NEG mg/dL    Ketone NEGATIVE  NEG mg/dL    Bilirubin NEGATIVE  NEG      Blood NEGATIVE  NEG      Urobilinogen 0.2 0.2 - 1.0 EU/dL    Nitrites NEGATIVE  NEG      Leukocyte Esterase NEGATIVE  NEG      WBC 0-4 0 - 4 /hpf    RBC 0-5 0 - 5 /hpf    Epithelial cells FEW FEW /lpf    Bacteria NEGATIVE  NEG /hpf    UA:UC IF INDICATED CULTURE NOT INDICATED BY UA RESULT CNI     DRUG SCREEN, URINE    Collection Time: 05/13/17  1:44 AM   Result Value Ref Range    AMPHETAMINE NEGATIVE  NEG      BARBITURATES NEGATIVE  NEG      BENZODIAZEPINE NEGATIVE  NEG      COCAINE NEGATIVE  NEG      METHADONE NEGATIVE  NEG      OPIATES NEGATIVE  NEG      PCP(PHENCYCLIDINE) NEGATIVE  NEG      THC (TH-CANNABINOL) NEGATIVE  NEG      Drug screen comment (NOTE)    ETHYL ALCOHOL    Collection Time: 05/13/17  3:00 AM   Result Value Ref Range    ALCOHOL(ETHYL),SERUM 113 (H) <10 MG/DL       IMAGING RESULTS:  CT CODE NEURO HEAD WO CONTRAST   Final Result   Study Result      EXAM: CT CODE NEURO HEAD WO CONTRAST     INDICATION: Chronic headache, hypertension, code stroke.     COMPARISON: None     TECHNIQUE: Noncontrast head CT. Coronal and sagittal reformats. CT dose  reduction was achieved through the use of a standardized protocol tailored for  this examination and automatic exposure control for dose modulation.     FINDINGS: The ventricles and sulci are age-appropriate without hydrocephalus. There is no mass effect or midline shift. There is no intracranial hemorrhage or  extra-axial fluid collection. There is no abnormal area of decreased density to  suggest infarct.     The calvarium is intact. The visualized paranasal sinuses and mastoid air cells  are clear.     IMPRESSION  IMPRESSION:      Normal noncontrast head CT.           XR CHEST PORT    (Results Pending)   MRI BRAIN WO CONT    (Results Pending)   MRA NECK WO CONT    (Results Pending)   MRA BRAIN WO CONT    (Results Pending)       MEDICATIONS GIVEN:  Medications   sodium chloride (NS) flush 5-10 mL (5 mL IntraVENous Refused 5/13/17 0144)   sodium chloride (NS) flush 5-10 mL (not administered)   ondansetron (ZOFRAN) injection 4 mg (not administered)   labetalol (NORMODYNE;TRANDATE) injection 10 mg (not administered)   acetaminophen (TYLENOL) tablet 650 mg (not administered)     Or   acetaminophen (TYLENOL) solution 650 mg (not administered)     Or   acetaminophen (TYLENOL) suppository 650 mg (not administered)   bisacodyl (DULCOLAX) tablet 5 mg (not administered)   enoxaparin (LOVENOX) injection 40 mg (not administered)   butalbital-acetaminophen-caffeine (FIORICET, ESGIC) -40 mg per tablet 1 Tab (1 Tab Oral Given 5/13/17 3486)       IMPRESSION:  1. Vision changes    2. Facial droop        PLAN: Admit     ADMIT NOTE:  1:48 AM  Patient is being admitted to the hospital by Dr. Ursula Zavala.   The results of their tests and reasons for their admission have been discussed with them and/or available family. They convey agreement and understanding for the need to be admitted and for their admission diagnosis. Consultation has been made with the inpatient physician specialist for hospitalization. This note is prepared by Gurvinder Duval acting as Scribe for MD Gabriela Rice MD: The scribe's documentation has been prepared under my direction and personally reviewed by me in its entirety. I confirm that the note above accurately reflects all work, treatment, procedures, and medical decision making performed by me.

## 2017-05-13 NOTE — ED NOTES
Patient and family playing cards, she admits to having two glasses of wine. She went to bathroom and noted L pupil was smaller than the other and thought her mouth was drooping on the same side and felt numb like \"at the dentist\" and her arm was a little weak. Patient states at this time her headache is worse than it was, weakness feels better in her arm, face still feels \"tingly\" and L side of mouth feels \"heavy\". Headache is from back to front across the top. Patient has high BP but has not been taking her medication as she has no insurance. Dr. Arabella Rosado teleneurologist evaluating patient.

## 2017-05-13 NOTE — PROGRESS NOTES
Stroke Education provided to patient and the following topics were discussed    1. Patients personal risk factors for stroke are hypertension, smoking and family history    2. Warning signs of Stroke:        * Sudden numbness or weakness of the face, arm or leg, especially on one side of          The body            * Sudden confusion, trouble speaking or understanding        * Sudden trouble seeing in one or both eyes        * Sudden trouble walking, dizziness, loss of balance or coordination        * Sudden severe headache with no known cause      3. Importance of activation Emergency Medical Services ( 9-1-1 ) immediately if experience any warning signs of stroke. 4. Be sure and schedule a follow-up appointment with your primary care doctor or any specialists as instructed. 5. You must take medicine every day to treat your risk factors for stroke. Be sure to take your medicines exactly as your doctor tells you: no more, no less. Know what your medicines are for , what they do. Anti-thrombotics /anticoagulants can help prevent strokes. You are taking the following medicine(s)  none (lovenox while inpatient)     6. Smoking and second-hand smoke greatly increase your risk of stroke, cardiovascular disease and death. Smoking history packs, 0.5 per day    7. Information provided was [facility] Stroke Education Stuart Mole or Verbal Education    8. Documentation of teaching completed in Patient Education Activity and on Care Plan with teaching response noted?   yes

## 2017-05-14 LAB
ATRIAL RATE: 87 BPM
CALCULATED P AXIS, ECG09: 72 DEGREES
CALCULATED R AXIS, ECG10: 40 DEGREES
CALCULATED T AXIS, ECG11: 51 DEGREES
DIAGNOSIS, 93000: NORMAL
P-R INTERVAL, ECG05: 120 MS
Q-T INTERVAL, ECG07: 376 MS
QRS DURATION, ECG06: 98 MS
QTC CALCULATION (BEZET), ECG08: 452 MS
VENTRICULAR RATE, ECG03: 87 BPM

## 2017-05-14 NOTE — DISCHARGE SUMMARY
Hospitalist Discharge Summary     Patient ID:  Raheem Sibley  [id number]  29 y.o.  1980    PCP on record: PROVIDER UNKNOWN    Admit date: 5/13/2017  Discharge date and time: 5/14/2017      DISCHARGE DIAGNOSIS:  Complex migraine vs TIA in setting of drinking wine tonight:  - CT head normal noncontrast head CT  - MRI/MRA head and neck pending  - Echo pending  - Lipids, 236, , HDL 56, .8, start lipitor  - Pt with NSAID allergy, will await neuro recommendations regarding necessity of antiplatelet agent  - neuro consulted  - prn fioricet for HA tonight (this is her main complaint)  - PT/OT  Hypertension, benign/essential:  - has been off HCTZ since January  - will not restart med, prn labetalol only per protocol tonight  Sarcoidosis of lymph nodes: Clear lungs on imaging, but does have documented LAD on CT scans. No hypercalcemia. - CXR negative tonight, but prior CTs with LAD  - off prednisone since January  - feel she needs to return to rheumatology to consider further treatment (current barrier is insurance but she is in 90 days waiting period with new job and this will resolve soon)  Obesity      Code Status: Full  Surrogate Decision Maker:   DVT Prophylaxis: lovenox        Body mass index is 32.92 kg/(m^2). Pt signed against medical advise, risks explained to pt in detail, advised that she needs echo, ongoing monitoring and PT/OT/Rehab,  verbalized understanding yet insisted on leaving against medical advise    CONSULTATIONS:  IP CONSULT TO NEUROLOGY    Excerpted HPI from H&P of Sonya Jerry MD:  See H&P    ______________________________________________________________________  DISCHARGE SUMMARY/HOSPITAL COURSE:  for full details see H&P, daily progress notes, labs, consult notes.              __________________________________________________________________________________________________________________________  DISCHARGE MEDICATIONS:   Discharge Medication List as of 5/13/2017  6:11 PM          My Recommended Diet, Activity, Wound Care, and follow-up labs are listed in the patient's Discharge Insturctions which I have personally completed and reviewed.     _______________________________________________________________________  DISPOSITION:    Home with Family:    Home with HH/PT/OT/RN:    SNF/LTC:    MAR:    OTHER:        Condition at Discharge:  Stable  _______________________________________________________________________  Follow up with:   PCP : PROVIDER UNKNOWN  Follow-up Information     Follow up With Details Comments Contact Info    Provider Unknown   Patient not available to ask                Total time in minutes spent coordinating this discharge (includes going over instructions, follow-up, prescriptions, and preparing report for sign off to her PCP) :  30 minutes    Signed:  Jasper Morales MD

## 2017-08-14 ENCOUNTER — APPOINTMENT (OUTPATIENT)
Dept: ULTRASOUND IMAGING | Age: 37
End: 2017-08-14
Payer: COMMERCIAL

## 2017-08-14 ENCOUNTER — HOSPITAL ENCOUNTER (EMERGENCY)
Age: 37
Discharge: HOME | End: 2017-08-14
Attending: EMERGENCY MEDICINE
Payer: COMMERCIAL

## 2017-08-14 VITALS
SYSTOLIC BLOOD PRESSURE: 147 MMHG | TEMPERATURE: 98.2 F | BODY MASS INDEX: 33.19 KG/M2 | RESPIRATION RATE: 16 BRPM | HEIGHT: 62 IN | WEIGHT: 180.34 LBS | HEART RATE: 84 BPM | DIASTOLIC BLOOD PRESSURE: 95 MMHG | OXYGEN SATURATION: 100 %

## 2017-08-14 DIAGNOSIS — R10.2 PELVIC PAIN IN FEMALE: Primary | ICD-10-CM

## 2017-08-14 DIAGNOSIS — N76.0 BV (BACTERIAL VAGINOSIS): ICD-10-CM

## 2017-08-14 DIAGNOSIS — D86.9 SARCOIDOSIS: ICD-10-CM

## 2017-08-14 DIAGNOSIS — B96.89 BV (BACTERIAL VAGINOSIS): ICD-10-CM

## 2017-08-14 LAB
APPEARANCE UR: CLEAR
BACTERIA URNS QL MICRO: ABNORMAL /HPF
BILIRUB UR QL: NEGATIVE
CLUE CELLS VAG QL WET PREP: NORMAL
COLOR UR: ABNORMAL
EPITH CASTS URNS QL MICRO: ABNORMAL /LPF
GLUCOSE UR STRIP.AUTO-MCNC: NEGATIVE MG/DL
HCG UR QL: NEGATIVE
HGB UR QL STRIP: NEGATIVE
HYALINE CASTS URNS QL MICRO: ABNORMAL /LPF (ref 0–5)
KETONES UR QL STRIP.AUTO: NEGATIVE MG/DL
KOH PREP SPEC: NORMAL
LEUKOCYTE ESTERASE UR QL STRIP.AUTO: NEGATIVE
NITRITE UR QL STRIP.AUTO: NEGATIVE
PH UR STRIP: 5.5 [PH] (ref 5–8)
PROT UR STRIP-MCNC: NEGATIVE MG/DL
RBC #/AREA URNS HPF: ABNORMAL /HPF (ref 0–5)
SERVICE CMNT-IMP: NORMAL
SP GR UR REFRACTOMETRY: 1.01 (ref 1–1.03)
T VAGINALIS VAG QL WET PREP: NORMAL
UA: UC IF INDICATED,UAUC: ABNORMAL
UROBILINOGEN UR QL STRIP.AUTO: 0.2 EU/DL (ref 0.2–1)
WBC URNS QL MICRO: ABNORMAL /HPF (ref 0–4)

## 2017-08-14 PROCEDURE — 87186 SC STD MICRODIL/AGAR DIL: CPT | Performed by: PHYSICIAN ASSISTANT

## 2017-08-14 PROCEDURE — 87210 SMEAR WET MOUNT SALINE/INK: CPT | Performed by: PHYSICIAN ASSISTANT

## 2017-08-14 PROCEDURE — 96372 THER/PROPH/DIAG INJ SC/IM: CPT

## 2017-08-14 PROCEDURE — 74011000250 HC RX REV CODE- 250: Performed by: PHYSICIAN ASSISTANT

## 2017-08-14 PROCEDURE — 87077 CULTURE AEROBIC IDENTIFY: CPT | Performed by: PHYSICIAN ASSISTANT

## 2017-08-14 PROCEDURE — 87491 CHLMYD TRACH DNA AMP PROBE: CPT | Performed by: PHYSICIAN ASSISTANT

## 2017-08-14 PROCEDURE — 76830 TRANSVAGINAL US NON-OB: CPT

## 2017-08-14 PROCEDURE — 76856 US EXAM PELVIC COMPLETE: CPT

## 2017-08-14 PROCEDURE — 74011250636 HC RX REV CODE- 250/636: Performed by: PHYSICIAN ASSISTANT

## 2017-08-14 PROCEDURE — 87086 URINE CULTURE/COLONY COUNT: CPT | Performed by: PHYSICIAN ASSISTANT

## 2017-08-14 PROCEDURE — 81001 URINALYSIS AUTO W/SCOPE: CPT | Performed by: PHYSICIAN ASSISTANT

## 2017-08-14 PROCEDURE — 74011250637 HC RX REV CODE- 250/637: Performed by: PHYSICIAN ASSISTANT

## 2017-08-14 PROCEDURE — 99283 EMERGENCY DEPT VISIT LOW MDM: CPT

## 2017-08-14 PROCEDURE — 81025 URINE PREGNANCY TEST: CPT | Performed by: PHYSICIAN ASSISTANT

## 2017-08-14 RX ORDER — FLUCONAZOLE 150 MG/1
150 TABLET ORAL
Qty: 1 TAB | Refills: 0 | Status: SHIPPED | OUTPATIENT
Start: 2017-08-14 | End: 2017-08-14

## 2017-08-14 RX ORDER — DOXYCYCLINE HYCLATE 100 MG
100 TABLET ORAL 2 TIMES DAILY
Qty: 14 TAB | Refills: 0 | Status: SHIPPED | OUTPATIENT
Start: 2017-08-14 | End: 2017-08-21

## 2017-08-14 RX ORDER — ONDANSETRON 4 MG/1
4 TABLET, ORALLY DISINTEGRATING ORAL
Qty: 10 TAB | Refills: 0 | Status: SHIPPED | OUTPATIENT
Start: 2017-08-14 | End: 2017-12-08

## 2017-08-14 RX ORDER — AZITHROMYCIN 250 MG/1
1000 TABLET, FILM COATED ORAL
Status: COMPLETED | OUTPATIENT
Start: 2017-08-14 | End: 2017-08-14

## 2017-08-14 RX ORDER — OXYCODONE AND ACETAMINOPHEN 5; 325 MG/1; MG/1
1 TABLET ORAL
Qty: 15 TAB | Refills: 0 | Status: SHIPPED | OUTPATIENT
Start: 2017-08-14 | End: 2017-12-08

## 2017-08-14 RX ORDER — METRONIDAZOLE 500 MG/1
500 TABLET ORAL 2 TIMES DAILY
Qty: 14 TAB | Refills: 0 | Status: SHIPPED | OUTPATIENT
Start: 2017-08-14 | End: 2017-08-21

## 2017-08-14 RX ORDER — HYDROMORPHONE HYDROCHLORIDE 1 MG/ML
1 INJECTION, SOLUTION INTRAMUSCULAR; INTRAVENOUS; SUBCUTANEOUS
Status: COMPLETED | OUTPATIENT
Start: 2017-08-14 | End: 2017-08-14

## 2017-08-14 RX ORDER — ONDANSETRON 4 MG/1
4 TABLET, ORALLY DISINTEGRATING ORAL
Status: COMPLETED | OUTPATIENT
Start: 2017-08-14 | End: 2017-08-14

## 2017-08-14 RX ORDER — CEFTRIAXONE 250 MG/8ML
250 INJECTION, POWDER, FOR SOLUTION INTRAMUSCULAR; INTRAVENOUS
Status: DISCONTINUED | OUTPATIENT
Start: 2017-08-14 | End: 2017-08-14 | Stop reason: SDUPTHER

## 2017-08-14 RX ADMIN — ONDANSETRON 4 MG: 4 TABLET, ORALLY DISINTEGRATING ORAL at 16:25

## 2017-08-14 RX ADMIN — AZITHROMYCIN 1000 MG: 250 TABLET, FILM COATED ORAL at 16:25

## 2017-08-14 RX ADMIN — HYDROMORPHONE HYDROCHLORIDE 1 MG: 1 INJECTION, SOLUTION INTRAMUSCULAR; INTRAVENOUS; SUBCUTANEOUS at 16:26

## 2017-08-14 RX ADMIN — LIDOCAINE HYDROCHLORIDE 250 MG: 10 INJECTION, SOLUTION EPIDURAL; INFILTRATION; INTRACAUDAL; PERINEURAL at 16:28

## 2017-08-14 NOTE — DISCHARGE INSTRUCTIONS
Pelvic Pain: Care Instructions  Your Care Instructions    Pelvic pain, or pain in the lower belly, can have many causes. Often pelvic pain is not serious and gets better in a few days. If your pain continues or gets worse, you may need tests and treatment. Tell your doctor about any new symptoms. These may be signs of a serious problem. Follow-up care is a key part of your treatment and safety. Be sure to make and go to all appointments, and call your doctor if you are having problems. It's also a good idea to know your test results and keep a list of the medicines you take. How can you care for yourself at home? · Rest until you feel better. Lie down, and raise your legs by placing a pillow under your knees. · Drink plenty of fluids. You may find that small, frequent sips are easier on your stomach than if you drink a lot at once. Avoid drinks with carbonation or caffeine, such as soda pop, tea, or coffee. · Try eating several small meals instead of 2 or 3 large ones. Eat mild foods, such as rice, dry toast or crackers, bananas, and applesauce. Avoid fatty and spicy foods, other fruits, and alcohol until 48 hours after your symptoms have gone away. · Take an over-the-counter pain medicine, such as acetaminophen (Tylenol), ibuprofen (Advil, Motrin), or naproxen (Aleve). Read and follow all instructions on the label. · Do not take two or more pain medicines at the same time unless the doctor told you to. Many pain medicines have acetaminophen, which is Tylenol. Too much acetaminophen (Tylenol) can be harmful. · You can put a heating pad, a warm cloth, or moist heat on your belly to relieve pain. When should you call for help? Call 911 anytime you think you may need emergency care. For example, call if:  · You passed out (lost consciousness). Call your doctor now or seek immediate medical care if:  · Your pain is getting worse. · Your pain becomes focused in one area of your belly.   · You have severe vaginal bleeding. Severe means that you are soaking through your usual pads or tampons every hour for 2 or more hours and passing clots of blood. · You have new symptoms such as fever, urinary problems or unexpected vaginal bleeding. · You are dizzy or lightheaded, or you feel like you may faint. Watch closely for changes in your health, and be sure to contact your doctor if:  · You do not get better as expected. Where can you learn more? Go to http://sandrine-shaista.info/. Enter [phone] in the search box to learn more about \"Pelvic Pain: Care Instructions. \"  Current as of: October 13, 2016  Content Version: 11.3  © 9136-2222 "[employer]. Care instructions adapted under license by [employer] (which disclaims liability or warranty for this information). If you have questions about a medical condition or this instruction, always ask your healthcare professional. [place] [address] any warranty or liability for your use of this information. Bacterial Vaginosis: Care Instructions  Your Care Instructions    Bacterial vaginosis is a type of vaginal infection. It is caused by excess growth of certain bacteria that are normally found in the vagina. Symptoms can include itching, swelling, pain when you urinate or have sex, and a gray or yellow discharge with a \"fishy\" odor. It is not considered an infection that is spread through sexual contact. Although symptoms can be annoying and uncomfortable, bacterial vaginosis does not usually cause other health problems. However, if you have it while you are pregnant, it can cause complications. While the infection may go away on its own, most doctors use antibiotics to treat it. You may have been prescribed pills or vaginal cream. With treatment, bacterial vaginosis usually clears up in 5 to 7 days. Follow-up care is a key part of your treatment and safety.  Be sure to make and go to all appointments, and call your doctor if you are having problems. It's also a good idea to know your test results and keep a list of the medicines you take. How can you care for yourself at home? · Take your antibiotics as directed. Do not stop taking them just because you feel better. You need to take the full course of antibiotics. · Do not eat or drink anything that contains alcohol if you are taking metronidazole (Flagyl). · Keep using your medicine if you start your period. Use pads instead of tampons while using a vaginal cream or suppository. Tampons can absorb the medicine. · Wear loose cotton clothing. Do not wear nylon and other materials that hold body heat and moisture close to the skin. · Do not scratch. Relieve itching with a cold pack or a cool bath. · Do not wash your vaginal area more than once a day. Use plain water or a mild, unscented soap. Do not douche. When should you call for help? Watch closely for changes in your health, and be sure to contact your doctor if:  · You have unexpected vaginal bleeding. · You have a fever. · You have new or increased pain in your vagina or pelvis. · You are not getting better after 1 week. · Your symptoms return after you finish the course of your medicine. Where can you learn more? Go to http://sandrine-shaista.info/. Madalyn Apley in the search box to learn more about \"Bacterial Vaginosis: Care Instructions. \"  Current as of: October 13, 2016  Content Version: 11.3  © 3400-0749 [employer]. Care instructions adapted under license by [employer] (which disclaims liability or warranty for this information). If you have questions about a medical condition or this instruction, always ask your healthcare professional. [place] [address] any warranty or liability for your use of this information.        Pelvic Inflammatory Disease: Care Instructions  Your Care Instructions    Pelvic inflammatory disease, or PID, is an infection of a womans fallopian tubes and other reproductive organs. PID is usually caused by a sexually transmitted infection (STI), such as gonorrhea or chlamydia. PID can cause scars in the fallopian tubes, making it hard for a woman to get pregnant. Having one STI increases your risk for other STIs, such as genital herpes, genital warts, syphilis, and HIV. It is important to take all the medicine that was prescribed. PID can cause serious health problems if you do not complete your treatment. Follow-up care is a key part of your treatment and safety. Be sure to make and go to all appointments, and call your doctor if you are having problems. Its also a good idea to know your test results and keep a list of the medicines you take. How can you care for yourself at home? · Take your antibiotics as directed. Do not stop taking them just because you feel better. You need to take the full course of antibiotics. · Rest until your fever and pain have improved. · Take pain medicines exactly as directed. ¨ If the doctor gave you a prescription medicine for pain, take it as prescribed. ¨ If you are not taking a prescription pain medicine, ask your doctor if you can take an over-the-counter medicine. · Use a hot water bottle or a heating pad (set on low) on your belly for pain. · Do not douche. · Do not have sex or use tampons (you can use pads instead) until you have taken all the medicine, your pain is gone, and you feel completely well. · Talk to any sex partners you have had in the past 2 months. They need to be tested and may need to be treated for STIs. To prevent STIs  · Use latex condoms every time you have sex. Use them from the beginning to the end of sexual contact. · Talk to your partner before you have sex. Find out if he or she has or is at risk for any sexually transmitted infection (STI). Keep in mind that a person may be able to spread an STI even if he or she does not have symptoms.   · Do not have sex with anyone who has symptoms of an STI, such as sores on the genitals or mouth. · Having one sex partner (who does not have STIs and does not have sex with anyone else) is a good way to avoid STIs. When should you call for help? Call your doctor now or seek immediate medical care if:  · You have a new or higher fever. · Your pain gets worse. · You think you may be pregnant. Watch closely for changes in your health, and be sure to contact your doctor if:  · You vomit or have diarrhea. · You are not getting better after 2 days. Where can you learn more? Go to http://sandrine-shaista.info/. Enter N294 in the search box to learn more about \"Pelvic Inflammatory Disease: Care Instructions. \"  Current as of: October 13, 2016  Content Version: 11.3  © 5709-6279 [employer]. Care instructions adapted under license by [employer] (which disclaims liability or warranty for this information). If you have questions about a medical condition or this instruction, always ask your healthcare professional. [address] any warranty or liability for your use of this information.

## 2017-08-14 NOTE — Clinical Note
Rest, push fluids, follow up with Ob/Gyn for recheck. Contact information provided for Ob/Gyn, primary care, and pulmonology as needed. Return to the Emergency Dept for any continued/worsening pain, fever, nausea/vomiting. Thank you for allowing us  to provide you with medical care today. We realize that you have many choices for your emergency care needs. We thank you for choosing [facility]. Please choose us in the future for any continued health care needs. We hope we  addressed all of your medical concerns. We strive to provide excellent quality care in the Emergency Department. Anything less than excellent care does not meet our expectations. If a prescription has been provided, please have it filled as soon as  possible to avoid a delay in treatment. Read the entire medication instruction sheet provided to you by the pharmacy. If you have any questions or reservations about taking the medication due to side effects or interactions with other medications plea se call the ER or your primary care physician. The exam and treatment you received in the Emergency Department were for an emergent problem and is not intended as complete care. It is important that you follow up with a doctor, nurse practitioner, or   [id number] assistant for ongoing care. If your symptoms worsen or you do not improve as expected and you are unable to reach your usual health care provider, you should return to the Emergency Department. We are available 24 hours a day. You may be  contacted by a [address] for your opinion of this visit. We would appreciate it if you answer \"very satisfied\" to the survey questions.  If you are unable to answer that you are \"very satisfied\" with your visit please contact our nurse manager at [phone]  to discuss your concerns. We strive to do the best we can and your opinions are important to us and anything less that \"very satisfied\" is not acceptable to Immanuel Hogan or [place].

## 2017-08-14 NOTE — ED NOTES
Bedside and Verbal shift change report given to Ting Iniguez RN (oncoming nurse) by Ani Padgett RN (offgoing nurse). Report included the following information SBAR, ED Summary, MAR and Recent Results. Patient currently in [address] [facility] [facility] at this time.

## 2017-08-14 NOTE — ED PROVIDER NOTES
HPI Comments: Enrique Rashid is a 29 y.o. Female, with a pertinent PMHx of HTN and sarcoidosis, who presents ambulatory to the [address] [facility] ED c/o gradual onset pelvic pain x last night. Pt notes associated symptoms of vaginal discharge, little vaginal bleeding and significant pain/pressure upon urinating. She reports she became concerned she had left her tampon in for an extended period of time, which was placed 1 week ago. She states she is in a great deal of discomfort and is unable to get comfortable. She additionally states she has a h/o trichomonas and HPV. Pt is currently without a PCP or GYN. She denies any chance of pregnancy. Pt specifically denies changes in her bowel habits, nausea, vomiting, or fever. Social hx: + Tobacco use, + EtOH use, - Illicit drug use    PCP: PROVIDER UNKNOWN    There are no other complaints, changes or physical findings at this time. The history is provided by the patient. No  was used. Past Medical History:   Diagnosis Date    Anemia     Headache     Hypertension     Obesity     PUD (peptic ulcer disease) 2002    resolved    Sarcoidosis of lymph nodes ([facility] [address]        Past Surgical History:   Procedure Laterality Date    HX GYN      Left ovary and fallopian tube removed, D&C, tubal ligation    HX OTHER SURGICAL  2014    Endometrial Ablation, Dr. Jose G Taylor HX OTHER SURGICAL  8/2015    lymph node removal    HX OTHER SURGICAL Left 8/27/15    excision of left groin lymph node.  HX TUBAL LIGATION  2005    Dr. Fredy Jon         Family History:   Problem Relation Age of Onset    Hypertension Mother     Cancer Maternal Grandfather      stomach       Social History     Social History    Marital status: LEGALLY      Spouse name: N/A    Number of children: N/A    Years of education: N/A     Occupational History    Not on file.      Social History Main Topics    Smoking status: Current Every Day Smoker     Packs/day: 0.50     Years: 15.00    Smokeless tobacco: Not on file    Alcohol use Yes      Comment: occasionally    Drug use: No    Sexual activity: Not on file     Other Topics Concern    Not on file     Social History Narrative         ALLERGIES: Latex; Motrin [ibuprofen]; and Tramadol    Review of Systems   Constitutional: Negative for chills and fever. HENT: Negative for congestion, rhinorrhea and sore throat. Respiratory: Negative for cough and shortness of breath. Cardiovascular: Negative for chest pain and palpitations. Gastrointestinal: Negative for abdominal pain, constipation, diarrhea, nausea and vomiting. Genitourinary: Positive for dysuria, pelvic pain, vaginal bleeding and vaginal discharge. Negative for hematuria. Musculoskeletal: Negative for neck pain and neck stiffness. Skin: Negative for rash and wound. Neurological: Negative for dizziness and headaches. Psychiatric/Behavioral: Negative for agitation and confusion. Patient Vitals for the past 12 hrs:   Temp Pulse Resp BP SpO2   08/14/17 1333 98.2 °F (36.8 °C) 84 16 (!) 147/95 100 %            Physical Exam   Constitutional: She is oriented to person, place, and time. She appears well-developed and well-nourished. No distress. HENT:   Head: Normocephalic and atraumatic. Nose: Nose normal.   Mouth/Throat: Oropharynx is clear and moist. No oropharyngeal exudate. Eyes: Conjunctivae and EOM are normal. Right eye exhibits no discharge. Left eye exhibits no discharge. No scleral icterus. Neck: Normal range of motion. Neck supple. No JVD present. No tracheal deviation present. No thyromegaly present. Cardiovascular: Normal rate, regular rhythm and normal heart sounds. Pulmonary/Chest: Effort normal and breath sounds normal. No respiratory distress. She has no wheezes. Abdominal: Soft. There is no tenderness. Musculoskeletal: Normal range of motion. She exhibits no edema.    Lymphadenopathy:     She has no cervical adenopathy. Neurological: She is alert and oriented to person, place, and time. She exhibits normal muscle tone. Coordination normal.   Skin: Skin is warm and dry. She is not diaphoretic. Psychiatric: She has a normal mood and affect. Her behavior is normal. Judgment normal.   Nursing note and vitals reviewed. MDM  Number of Diagnoses or Management Options  Diagnosis management comments: DDx: UTI, PID, STD, retained foreign body       Amount and/or Complexity of Data Reviewed  Clinical lab tests: ordered and reviewed  Tests in the radiology section of CPT®: ordered and reviewed  Review and summarize past medical records: yes  Independent visualization of images, tracings, or specimens: yes    Patient Progress  Patient progress: stable    ED Course       Pelvic Exam  Date/Time: 8/14/2017 2:00 PM  Performed by: PA  Procedure duration:  15 minutes. Documented by:  Alejandra Serna PA-C. Exam assisted by:  Desiree Vieira RN. Type of exam performed: bimanual and speculum. External genitalia appearance: normal.    Vaginal exam:  discharge. The amount of discharge was:  mild. The discharge was thin and mucousy. Cervical exam:  os closed and no cervical motion tenderness. Specimen(s) collected:  chlamydia and GC. Bimanual exam:  left adenexal tenderness. Patient tolerance: Patient tolerated the procedure well with no immediate complications          3:41 PM  Discussed the risks of smoking and the benefits of smoking cessation as well as the long term sequelae of smoking with the pt. The patient verbalized their understanding. Procedure Note - Pelvic Exam:    2:24 PM  Performed by: Mendoza Keating    See pelvic exam for details. Given significant pain will treat with US. Will treat as PID. The procedure took 1-15 minutes, and pt tolerated well. Written by EMRE Grullon, as dictated by Mendoza Keating.     LABORATORY TESTS:  Recent Results (from the past 12 hour(s))   KOH, OTHER SOURCES    Collection Time: 08/14/17  2:03 PM   Result Value Ref Range    Special Requests: NO SPECIAL REQUESTS      KOH NO YEAST SEEN     WET PREP    Collection Time: 08/14/17  2:03 PM   Result Value Ref Range    Clue cells CLUE CELLS PRESENT      Wet prep NO TRICHOMONAS SEEN     URINALYSIS W/ REFLEX CULTURE    Collection Time: 08/14/17  2:03 PM   Result Value Ref Range    Color YELLOW/STRAW      Appearance CLEAR CLEAR      Specific gravity 1.014 1.003 - 1.030      pH (UA) 5.5 5.0 - 8.0      Protein NEGATIVE  NEG mg/dL    Glucose NEGATIVE  NEG mg/dL    Ketone NEGATIVE  NEG mg/dL    Bilirubin NEGATIVE  NEG      Blood NEGATIVE  NEG      Urobilinogen 0.2 0.2 - 1.0 EU/dL    Nitrites NEGATIVE  NEG      Leukocyte Esterase NEGATIVE  NEG      WBC 0-4 0 - 4 /hpf    RBC 0-5 0 - 5 /hpf    Epithelial cells FEW FEW /lpf    Bacteria 1+ (A) NEG /hpf    UA:UC IF INDICATED URINE CULTURE ORDERED (A) CNI      Hyaline cast 0-2 0 - 5 /lpf   HCG URINE, QL    Collection Time: 08/14/17  2:03 PM   Result Value Ref Range    HCG urine, Ql. NEGATIVE  NEG         IMAGING RESULTS:  US PELV NON OBS   Final Result   INDICATION: Pelvic pain. Pelvic pain on the left possible PID     COMPARISON: 7/28/2015     The patient was studied with real-time ultrasound using transvaginal and  transvesicle techniques.      Transabdominal:      Uterus: The uterus is anteverted and measures 9.1 x 4.3 x 7.4 cm. Echotexture of  the uterus is normal. The endometrial stripe is approximately 6.8 mm. There is  no fluid within the endometrial cavity. Ovaries: The right ovary is obscured by bowel gas. Left ovary is surgically  absent. No abnormal fluid seen in the pelvis. No abnormal mass is identified in the  pelvis.     Transvaginal:  Transvaginal exam was performed to better evaluate the endometrial stripe and  ovaries.     Uterus: The uterus is normal in shape and echotexture and measures 4.8 x 6.6 x  9.2 cm. Endometrial stripe is 5.5 mm.  There is no fluid within the endometrial  cavity. Nabothian cysts noted in the cervix. .  Ovaries: The right ovary contains follicles. The right ovary measures 2.3 x 2.8  x 3.5 cm. Doppler flow is noted in the right ovary. The left ovary is surgically  absent. No abnormal fluid is seen in the pelvis. No abnormal mass is identified in the  pelvis.     IMPRESSION  IMPRESSION:   Uterus is unremarkable for age. Right ovary unremarkable. No abnormal fluid or  mass noted. US TRANSVAGINAL   Final Result   INDICATION: Pelvic pain. Pelvic pain on the left possible PID     COMPARISON: 7/28/2015     The patient was studied with real-time ultrasound using transvaginal and  transvesicle techniques.      Transabdominal:      Uterus: The uterus is anteverted and measures 9.1 x 4.3 x 7.4 cm. Echotexture of  the uterus is normal. The endometrial stripe is approximately 6.8 mm. There is  no fluid within the endometrial cavity. Ovaries: The right ovary is obscured by bowel gas. Left ovary is surgically  absent. No abnormal fluid seen in the pelvis. No abnormal mass is identified in the  pelvis.     Transvaginal:  Transvaginal exam was performed to better evaluate the endometrial stripe and  ovaries.     Uterus: The uterus is normal in shape and echotexture and measures 4.8 x 6.6 x  9.2 cm. Endometrial stripe is 5.5 mm. There is no fluid within the endometrial  cavity. Nabothian cysts noted in the cervix. .  Ovaries: The right ovary contains follicles. The right ovary measures 2.3 x 2.8  x 3.5 cm. Doppler flow is noted in the right ovary. The left ovary is surgically  absent. No abnormal fluid is seen in the pelvis. No abnormal mass is identified in the  pelvis.     IMPRESSION  IMPRESSION:   Uterus is unremarkable for age. Right ovary unremarkable. No abnormal fluid or  mass noted.        MEDICATIONS GIVEN:  Medications   azithromycin (ZITHROMAX) tablet 1,000 mg (1,000 mg Oral Given 8/14/17 8626)   HYDROmorphone (PF) (DILAUDID) injection 1 mg (1 mg IntraMUSCular Given 8/14/17 1626)   ondansetron (ZOFRAN ODT) tablet 4 mg (4 mg Oral Given 8/14/17 1625)   cefTRIAXone (ROCEPHIN) 250 mg in lidocaine (PF) (XYLOCAINE) 10 mg/mL (1 %) IM injection (250 mg IntraMUSCular Given 8/14/17 1628)       IMPRESSION:  1. Pelvic pain in female    2. BV (bacterial vaginosis)    3. Sarcoidosis ([address]        PLAN:  1. Discharge home  Current Discharge Medication List      START taking these medications    Details   oxyCODONE-acetaminophen (PERCOCET) 5-325 mg per tablet Take 1 Tab by mouth every six (6) hours as needed for Pain for up to 15 doses. Max Daily Amount: 4 Tabs. Qty: 15 Tab, Refills: 0      ondansetron (ZOFRAN ODT) 4 mg disintegrating tablet Take 1 Tab by mouth every eight (8) hours as needed for Nausea for up to 10 doses. Qty: 10 Tab, Refills: 0      metroNIDAZOLE (FLAGYL) 500 mg tablet Take 1 Tab by mouth two (2) times a day for 7 days. Qty: 14 Tab, Refills: 0      fluconazole (DIFLUCAN) 150 mg tablet Take 1 Tab by mouth now for 1 dose. FDA advises cautious prescribing of oral fluconazole in pregnancy. Qty: 1 Tab, Refills: 0      doxycycline (VIBRA-TABS) 100 mg tablet Take 1 Tab by mouth two (2) times a day for 7 days. Qty: 14 Tab, Refills: 0           2. Follow-up Information     Follow up With Details [address]   [address]  [address]  [phone]      [address]  [facility] [address]  [address] [address] [address] [facility], [address] [facility] [address]   [address]  [address] [place] [address]  [phone]      [facility] EMERGENCY DEPT  If symptoms worsen [address] [facility] [address]   [address] [phone]        Return to ED if worse     DISCHARGE NOTE  5:11 PM  The patient has been re-evaluated and is ready for discharge. Reviewed available results with patient. Counseled patient on diagnosis and care plan.  Patient has expressed understanding, and all questions have been answered. Patient agrees with plan and agrees to follow up as recommended, or return to the ED if their symptoms worsen. Discharge instructions have been provided and explained to the patient, along with reasons to return to the ED. ATTESTATION:  This note is prepared by [place], acting as Scribe for Kelley Santoro. ELEUTERIO Shipley: The scribe's documentation has been prepared under my direction and personally reviewed by me in its entirety. I confirm that the note above accurately reflects all work, treatment, procedures, and medical decision making performed by me.

## 2017-08-14 NOTE — ED NOTES
Assumed care of patient. Patient placed in position of comfort. Call bell in reach. Skin warm and dry. Respirations even and unlabored. In no apparent distress at this time. Pt presents ambulatory into the ED with c/o lower abdominal cramping, vaginal odor and vaginal irritation x 1 week. Pt thinks she may have a tampon stuck in he vagina x 1 week. Significant other at bedside.

## 2017-08-16 LAB
BACTERIA SPEC CULT: ABNORMAL
C TRACH DNA SPEC QL NAA+PROBE: NEGATIVE
CC UR VC: ABNORMAL
N GONORRHOEA DNA SPEC QL NAA+PROBE: NEGATIVE
SAMPLE TYPE: NORMAL
SERVICE CMNT-IMP: ABNORMAL
SERVICE CMNT-IMP: NORMAL
SPECIMEN SOURCE: NORMAL

## 2017-12-07 ENCOUNTER — HOSPITAL ENCOUNTER (EMERGENCY)
Age: 37
Discharge: HOME | End: 2017-12-08
Attending: EMERGENCY MEDICINE
Payer: COMMERCIAL

## 2017-12-07 DIAGNOSIS — L03.115 CELLULITIS OF RIGHT LOWER EXTREMITY: Primary | ICD-10-CM

## 2017-12-07 DIAGNOSIS — M79.604 LOWER EXTREMITY PAIN, RIGHT: ICD-10-CM

## 2017-12-07 PROCEDURE — 99283 EMERGENCY DEPT VISIT LOW MDM: CPT

## 2017-12-07 NOTE — LETTER
NOTIFICATION RETURN TO WORK / SCHOOL 
 
12/8/2017 3:07 AM 
 
Ms. Melva Prajapati [facility] To Whom It May Concern: 
 
Melva Prajapati is currently under the care of [facility] EMERGENCY DEPT. She will return to work/school on: 12-9-17 If there are questions or concerns please have the patient contact our office. Sincerely, Mor Murphy MD

## 2017-12-08 ENCOUNTER — APPOINTMENT (OUTPATIENT)
Dept: ULTRASOUND IMAGING | Age: 37
End: 2017-12-08
Attending: EMERGENCY MEDICINE
Payer: COMMERCIAL

## 2017-12-08 VITALS
RESPIRATION RATE: 19 BRPM | OXYGEN SATURATION: 100 % | SYSTOLIC BLOOD PRESSURE: 119 MMHG | HEIGHT: 60 IN | TEMPERATURE: 98 F | BODY MASS INDEX: 33.33 KG/M2 | HEART RATE: 81 BPM | WEIGHT: 169.75 LBS | DIASTOLIC BLOOD PRESSURE: 73 MMHG

## 2017-12-08 PROCEDURE — 93971 EXTREMITY STUDY: CPT

## 2017-12-08 PROCEDURE — 74011250637 HC RX REV CODE- 250/637: Performed by: EMERGENCY MEDICINE

## 2017-12-08 RX ORDER — OXYCODONE AND ACETAMINOPHEN 5; 325 MG/1; MG/1
1 TABLET ORAL
Status: COMPLETED | OUTPATIENT
Start: 2017-12-08 | End: 2017-12-08

## 2017-12-08 RX ORDER — CLINDAMYCIN HYDROCHLORIDE 300 MG/1
300 CAPSULE ORAL 4 TIMES DAILY
Qty: 28 CAP | Refills: 0 | Status: SHIPPED | OUTPATIENT
Start: 2017-12-08 | End: 2017-12-08 | Stop reason: SDUPTHER

## 2017-12-08 RX ORDER — CLINDAMYCIN HYDROCHLORIDE 300 MG/1
300 CAPSULE ORAL 4 TIMES DAILY
Qty: 28 CAP | Refills: 0 | Status: SHIPPED | OUTPATIENT
Start: 2017-12-08 | End: 2017-12-15

## 2017-12-08 RX ORDER — CLINDAMYCIN HYDROCHLORIDE 300 MG/1
300 CAPSULE ORAL 4 TIMES DAILY
Qty: 28 CAP | Refills: 0 | Status: SHIPPED | OUTPATIENT
Start: 2017-12-08 | End: 2017-12-08

## 2017-12-08 RX ORDER — PREGABALIN 75 MG/1
75 CAPSULE ORAL 2 TIMES DAILY
Qty: 45 CAP | Refills: 0 | Status: SHIPPED | OUTPATIENT
Start: 2017-12-08 | End: 2018-11-26

## 2017-12-08 RX ORDER — GABAPENTIN 300 MG/1
300 CAPSULE ORAL 3 TIMES DAILY
Qty: 15 CAP | Refills: 0 | Status: SHIPPED | OUTPATIENT
Start: 2017-12-08 | End: 2017-12-13

## 2017-12-08 RX ADMIN — OXYCODONE HYDROCHLORIDE AND ACETAMINOPHEN 1 TABLET: 5; 325 TABLET ORAL at 00:26

## 2017-12-08 NOTE — ED NOTES
Patient stating pain is \"about the same;\" patient stating it \"has calmed down a bit in her right leg, but left arm is throbbing. \" Patient requesting cool compress at this time; patient provided an ice pack at this time.

## 2017-12-08 NOTE — ED NOTES
Dr. Redd Push at bedside to provide discharge paperwork. Vital signs stable. Pt in no apparent distress at this time. Mental status at baseline. Ambulatory to waiting room with steady gate, discharge paperwork in hand. Accompanied by .

## 2017-12-08 NOTE — DISCHARGE INSTRUCTIONS
Take the antibiotics as prescribed, return if fevers, vomiting, increased swelling, redness or pain of the leg, unable to keep down fluids. Cellulitis: Care Instructions  Your Care Instructions    Cellulitis is a skin infection. It often occurs after a break in the skin from a scrape, cut, bite, or puncture, or after a rash. The doctor has checked you carefully, but problems can develop later. If you notice any problems or new symptoms, get medical treatment right away. Follow-up care is a key part of your treatment and safety. Be sure to make and go to all appointments, and call your doctor if you are having problems. It's also a good idea to know your test results and keep a list of the medicines you take. How can you care for yourself at home? · Take your antibiotics as directed. Do not stop taking them just because you feel better. You need to take the full course of antibiotics. · Prop up the infected area on pillows to reduce pain and swelling. Try to keep the area above the level of your heart as often as you can. · If your doctor told you how to care for your wound, follow your doctor's instructions. If you did not get instructions, follow this general advice:  ¨ Wash the wound with clean water 2 times a day. Don't use hydrogen peroxide or alcohol, which can slow healing. ¨ You may cover the wound with a thin layer of petroleum jelly, such as Vaseline, and a nonstick bandage. ¨ Apply more petroleum jelly and replace the bandage as needed. · Be safe with medicines. Take pain medicines exactly as directed. ¨ If the doctor gave you a prescription medicine for pain, take it as prescribed. ¨ If you are not taking a prescription pain medicine, ask your doctor if you can take an over-the-counter medicine. To prevent cellulitis in the future  · Try to prevent cuts, scrapes, or other injuries to your skin. Cellulitis most often occurs where there is a break in the skin.   · If you get a scrape, cut, mild burn, or bite, wash the wound with clean water as soon as you can to help avoid infection. Don't use hydrogen peroxide or alcohol, which can slow healing. · If you have swelling in your legs (edema), support stockings and good skin care may help prevent leg sores and cellulitis. · Take care of your feet, especially if you have diabetes or other conditions that increase the risk of infection. Wear shoes and socks. Do not go barefoot. If you have athlete's foot or other skin problems on your feet, talk to your doctor about how to treat them. When should you call for help? Call your doctor now or seek immediate medical care if:  ? · You have signs that your infection is getting worse, such as:  ¨ Increased pain, swelling, warmth, or redness. ¨ Red streaks leading from the area. ¨ Pus draining from the area. ¨ A fever. ? · You get a rash. ? Watch closely for changes in your health, and be sure to contact your doctor if:  ? · You are not getting better after 1 day (24 hours). ? · You do not get better as expected. Where can you learn more? Go to http://sandrine-shaista.info/. Alec Hall in the search box to learn more about \"Cellulitis: Care Instructions. \"  Current as of: October 13, 2016  Content Version: 11.4  © 0199-6180 [employer]. Care instructions adapted under license by [employer] (which disclaims liability or warranty for this information). If you have questions about a medical condition or this instruction, always ask your healthcare professional. [place] [address] any warranty or liability for your use of this information.

## 2017-12-08 NOTE — ED PROVIDER NOTES
EMERGENCY DEPARTMENT HISTORY AND PHYSICAL EXAM      Date: 12/7/2017  Patient Name: Hilary Javier    History of Presenting Illness     Chief Complaint   Patient presents with    Leg Pain     pt has hx of sarcoidosis, \"i ahve these lesions all over my body but a couple spots hurt really bad and are draining\"    Leg Swelling     to right lower anterior part of leg. pt reports yellow to greenish drainage intermittently x1 month    Chills       History Provided By: Patient    HPI: Hilary Javier, 40 y.o. female with PMHx significant for sarcoidosis, presents  to the ED with cc of right lower extremity pain and swelling. She was dx with sarcoidosis 5 years ago and has lesions all over her tattoos since that time, however there is one of her right shin that has been draining yellow fluid and is becoming more tender over last 4-5 days. She has been having pain in this leg for weeks. She is c/o difficulty working and getting around due to pain in her right leg, she has fevers and chills but has not taken her temperature. She has been seen by Dermatology in the past and prescribed prednisone a long time ago but has not taken in years due to losing her insurance, she just got insurance recently so has not been taking any medications or seen any physicians due to absence of insurance. She denies h/o blood clots, recent plane travel or car rides      PCP: PROVIDER UNKNOWN    There are no other complaints, changes, or physical findings at this time.         Past History     Past Medical History:  Past Medical History:   Diagnosis Date    Anemia     Headache     Hypertension     Obesity     PUD (peptic ulcer disease) 2002    resolved    Sarcoidosis of lymph nodes     Stroke [facility])     patient states TIA       Past Surgical History:  Past Surgical History:   Procedure Laterality Date    HX GYN      Left ovary and fallopian tube removed, D&C, tubal ligation    HX OTHER SURGICAL  2014    Endometrial Ablation,  Aly Jon     HX OTHER SURGICAL  8/2015    lymph node removal    HX OTHER SURGICAL Left 8/27/15    excision of left groin lymph node.  HX TUBAL LIGATION  2005    Dr. Dedra Jon       Family History:  Family History   Problem Relation Age of Onset    Hypertension Mother     Cancer Maternal Grandfather      stomach       Social History:  Social History   Substance Use Topics    Smoking status: Current Every Day Smoker     Packs/day: 0.50     Years: 15.00    Smokeless tobacco: Never Used    Alcohol use Yes      Comment: occasionally       Allergies: Allergies   Allergen Reactions    Latex Hives    Motrin [Ibuprofen] Hives     Allergic just to the coating of brand name Motrin, ok to take generic    Tramadol Rash         Review of Systems   Review of Systems   Constitutional: Positive for chills and fever. HENT: Negative for congestion and rhinorrhea. Eyes: Negative for pain and visual disturbance. Respiratory: Negative for cough, chest tightness, shortness of breath and wheezing. Cardiovascular: Negative for chest pain and leg swelling. Gastrointestinal: Negative for abdominal pain, constipation, diarrhea, nausea and vomiting. Endocrine: Negative. Genitourinary: Negative for difficulty urinating, dysuria and hematuria. Musculoskeletal: Positive for arthralgias (RLE) and myalgias (RLE). Negative for back pain and joint swelling. Skin: Positive for rash and wound (yellow drainage on right shin). Neurological: Negative for dizziness, syncope, weakness, light-headedness and numbness. Hematological: Negative. Psychiatric/Behavioral: Negative. Physical Exam   Physical Exam   Constitutional: She is oriented to person, place, and time. She appears well-developed and well-nourished. No distress. HENT:   Head: Normocephalic and atraumatic.    Mouth/Throat: Oropharynx is clear and moist.   Eyes: Conjunctivae and EOM are normal. Pupils are equal, round, and reactive to light.   Neck: Normal range of motion. Neck supple. Cardiovascular: Normal rate, regular rhythm, normal heart sounds and intact distal pulses. Exam reveals no gallop and no friction rub. No murmur heard. Pulmonary/Chest: Effort normal and breath sounds normal. No respiratory distress. She has no wheezes. She has no rales. Abdominal: Soft. Bowel sounds are normal. She exhibits no distension. There is no tenderness. Musculoskeletal: Normal range of motion. She exhibits no edema or deformity. Neurological: She is alert and oriented to person, place, and time. Skin: Skin is warm and dry. Rash (left ventral wrist with edema but no fluctuance, induration, drainage, mild tenderness; right shin with wound without surrounding edema, erythema, induration, fluctuance but tender surrounding) noted. She is not diaphoretic. Psychiatric: She has a normal mood and affect. Her behavior is normal.   Nursing note and vitals reviewed. Diagnostic Study Results     Radiologic Studies -     Study Result      EXAM:  DUPLEX LOWER EXT VENOUS RIGHT     INDICATION:  Right leg swelling, pain, DVT suspected     COMPARISON: None.     FINDINGS: Duplex Doppler sonography of the right lower extremity was performed  from the groin to the calf. The right common femoral, femoral and popliteal  veins are compressible with normal color-flow and wave forms and response to  physiologic maneuvers including Valsalva and augmentation.     IMPRESSION  IMPRESSION: No deep venous thrombosis identified. Medical Decision Making   I am the first provider for this patient. I reviewed the vital signs, available nursing notes, past medical history, past surgical history, family history and social history. Vital Signs-Reviewed the patient's vital signs.   Patient Vitals for the past 12 hrs:   Temp Pulse Resp BP SpO2   12/08/17 0215 - - - 119/73 100 %   12/08/17 0115 - - - 119/79 100 %   12/08/17 0030 - - - 116/70 100 %   12/08/17 0015 - - - (!) 141/103 -   12/07/17 2222 98 °F (36.7 °C) 81 19 (!) 163/100 98 %         Records Reviewed: Old Medical Records, Previous Radiology Studies and Previous Laboratory Studies    Provider Notes (Medical Decision Making):   Cellulitis, DVT, sarcoidosis flare, abscess  Patient with multiple sores over tattoos and open lesion on right shin without active drainage, no surrounding erythema, fluctuance, edema but tenderness over right shin; left wrist with edema but no drainage, fluctuance, induration, full ROM of RLE and LUE. Will tx for cellulitis with clindamycin to cover MRSA. Will obtain RLE Duplex to eval for DVT. ED Course:   Initial assessment performed. The patients presenting problems have been discussed, and they are in agreement with the care plan formulated and outlined with them. I have encouraged them to ask questions as they arise throughout their visit. 2:45 AM  LE duplex negative, will d/c with Clindamycin for cellulitis, patient requesting work note and something for nerve pain    Disposition:  Home    PLAN:  1. Current Discharge Medication List      START taking these medications    Details   clindamycin (CLEOCIN) 300 mg capsule Take 1 Cap by mouth four (4) times daily for 7 days. Qty: 28 Cap, Refills: 0      pregabalin (LYRICA) 75 mg capsule Take 1 Cap by mouth two (2) times a day. Max Daily Amount: 150 mg.  Qty: 45 Cap, Refills: 0           2. Follow-up Information     Follow up With Details Comments Contact Info    [facility] EM[facility] In 1 day If symptoms worsen [address] [facility] [address] [phone]        Return to ED if worse     Diagnosis     Clinical Impression:   1. Cellulitis of right lower extremity    2.  Lower extremity pain, right

## 2017-12-08 NOTE — ED TRIAGE NOTES
Patient arrives to ED with a report of leg pain, leg swelling, and chills. Patient has history of sarcoidosis, \"I have lesions all over my body but a couple spots hurt really bad and are draining. \" Patient reports swelling to right lower anterior part of leg and yellow to greenish drainage intermittently for just over a month. Patient was using a topical steroid cream and gave little relief. Patient has wound about 3 in long to right anterior leg and along tattoo on leg; wound to top of right. Wounds: scattered open wounds and lesions to back, chest, bilateral arms; bilateral legs (left leg 3 in wound with drainage and wound to top of foot). Wounds mostly on tattooed areas.

## 2018-03-19 ENCOUNTER — HOSPITAL ENCOUNTER (EMERGENCY)
Age: 38
Discharge: HOME | End: 2018-03-19
Attending: EMERGENCY MEDICINE
Payer: COMMERCIAL

## 2018-03-19 ENCOUNTER — APPOINTMENT (OUTPATIENT)
Dept: CT IMAGING | Age: 38
End: 2018-03-19
Attending: EMERGENCY MEDICINE
Payer: COMMERCIAL

## 2018-03-19 VITALS
OXYGEN SATURATION: 100 % | HEIGHT: 62 IN | SYSTOLIC BLOOD PRESSURE: 143 MMHG | RESPIRATION RATE: 16 BRPM | HEART RATE: 103 BPM | WEIGHT: 157.63 LBS | BODY MASS INDEX: 29.01 KG/M2 | DIASTOLIC BLOOD PRESSURE: 93 MMHG | TEMPERATURE: 98.9 F

## 2018-03-19 DIAGNOSIS — R10.2 PELVIC PAIN: ICD-10-CM

## 2018-03-19 DIAGNOSIS — R10.32 LLQ ABDOMINAL PAIN: Primary | ICD-10-CM

## 2018-03-19 DIAGNOSIS — N92.6 IRREGULAR MENSES: ICD-10-CM

## 2018-03-19 LAB
ANION GAP SERPL CALC-SCNC: 6 MMOL/L (ref 5–15)
APPEARANCE UR: CLEAR
BACTERIA URNS QL MICRO: NEGATIVE /HPF
BILIRUB UR QL: NEGATIVE
BUN SERPL-MCNC: 12 MG/DL (ref 6–20)
BUN/CREAT SERPL: 17 (ref 12–20)
CALCIUM SERPL-MCNC: 8.9 MG/DL (ref 8.5–10.1)
CHLORIDE SERPL-SCNC: 105 MMOL/L (ref 97–108)
CO2 SERPL-SCNC: 26 MMOL/L (ref 21–32)
COLOR UR: NORMAL
CREAT SERPL-MCNC: 0.7 MG/DL (ref 0.55–1.02)
EPITH CASTS URNS QL MICRO: NORMAL /LPF
ERYTHROCYTE [DISTWIDTH] IN BLOOD BY AUTOMATED COUNT: 12.6 % (ref 11.5–14.5)
GLUCOSE SERPL-MCNC: 85 MG/DL (ref 65–100)
GLUCOSE UR STRIP.AUTO-MCNC: NEGATIVE MG/DL
HCG UR QL: NEGATIVE
HCT VFR BLD AUTO: 37.8 % (ref 35–47)
HGB BLD-MCNC: 12.5 G/DL (ref 11.5–16)
HGB UR QL STRIP: NEGATIVE
KETONES UR QL STRIP.AUTO: NEGATIVE MG/DL
LEUKOCYTE ESTERASE UR QL STRIP.AUTO: NEGATIVE
MCH RBC QN AUTO: 30.6 PG (ref 26–34)
MCHC RBC AUTO-ENTMCNC: 33.1 G/DL (ref 30–36.5)
MCV RBC AUTO: 92.4 FL (ref 80–99)
NITRITE UR QL STRIP.AUTO: NEGATIVE
NRBC # BLD: 0 K/UL (ref 0–0.01)
NRBC BLD-RTO: 0 PER 100 WBC
PH UR STRIP: 6.5 [PH] (ref 5–8)
PLATELET # BLD AUTO: 241 K/UL (ref 150–400)
PMV BLD AUTO: 9.8 FL (ref 8.9–12.9)
POTASSIUM SERPL-SCNC: 4.2 MMOL/L (ref 3.5–5.1)
PROT UR STRIP-MCNC: NEGATIVE MG/DL
RBC # BLD AUTO: 4.09 M/UL (ref 3.8–5.2)
RBC #/AREA URNS HPF: NORMAL /HPF (ref 0–5)
SODIUM SERPL-SCNC: 137 MMOL/L (ref 136–145)
SP GR UR REFRACTOMETRY: 1.02 (ref 1–1.03)
UA: UC IF INDICATED,UAUC: NORMAL
UROBILINOGEN UR QL STRIP.AUTO: 1 EU/DL (ref 0.2–1)
WBC # BLD AUTO: 3.3 K/UL (ref 3.6–11)
WBC URNS QL MICRO: NORMAL /HPF (ref 0–4)

## 2018-03-19 PROCEDURE — 74177 CT ABD & PELVIS W/CONTRAST: CPT

## 2018-03-19 PROCEDURE — 81001 URINALYSIS AUTO W/SCOPE: CPT | Performed by: PHYSICIAN ASSISTANT

## 2018-03-19 PROCEDURE — 80048 BASIC METABOLIC PNL TOTAL CA: CPT | Performed by: PHYSICIAN ASSISTANT

## 2018-03-19 PROCEDURE — 81025 URINE PREGNANCY TEST: CPT

## 2018-03-19 PROCEDURE — 96374 THER/PROPH/DIAG INJ IV PUSH: CPT

## 2018-03-19 PROCEDURE — 74011636320 HC RX REV CODE- 636/320: Performed by: EMERGENCY MEDICINE

## 2018-03-19 PROCEDURE — 99283 EMERGENCY DEPT VISIT LOW MDM: CPT

## 2018-03-19 PROCEDURE — 85027 COMPLETE CBC AUTOMATED: CPT | Performed by: PHYSICIAN ASSISTANT

## 2018-03-19 PROCEDURE — 74011250636 HC RX REV CODE- 250/636: Performed by: EMERGENCY MEDICINE

## 2018-03-19 PROCEDURE — 36415 COLL VENOUS BLD VENIPUNCTURE: CPT | Performed by: PHYSICIAN ASSISTANT

## 2018-03-19 PROCEDURE — 96361 HYDRATE IV INFUSION ADD-ON: CPT

## 2018-03-19 RX ORDER — NAPROXEN 500 MG/1
500 TABLET ORAL
Qty: 20 TAB | Refills: 0 | Status: SHIPPED | OUTPATIENT
Start: 2018-03-19 | End: 2018-06-24

## 2018-03-19 RX ORDER — DICYCLOMINE HYDROCHLORIDE 10 MG/1
10 CAPSULE ORAL 4 TIMES DAILY
Qty: 20 CAP | Refills: 0 | Status: SHIPPED | OUTPATIENT
Start: 2018-03-19 | End: 2018-03-24

## 2018-03-19 RX ORDER — SODIUM CHLORIDE 9 MG/ML
50 INJECTION, SOLUTION INTRAVENOUS
Status: COMPLETED | OUTPATIENT
Start: 2018-03-19 | End: 2018-03-19

## 2018-03-19 RX ORDER — FENTANYL CITRATE 50 UG/ML
50 INJECTION, SOLUTION INTRAMUSCULAR; INTRAVENOUS
Status: COMPLETED | OUTPATIENT
Start: 2018-03-19 | End: 2018-03-19

## 2018-03-19 RX ORDER — SODIUM CHLORIDE 0.9 % (FLUSH) 0.9 %
10 SYRINGE (ML) INJECTION
Status: COMPLETED | OUTPATIENT
Start: 2018-03-19 | End: 2018-03-19

## 2018-03-19 RX ADMIN — Medication 10 ML: at 13:49

## 2018-03-19 RX ADMIN — SODIUM CHLORIDE 50 ML/HR: 900 INJECTION, SOLUTION INTRAVENOUS at 13:49

## 2018-03-19 RX ADMIN — FENTANYL CITRATE 50 MCG: 50 INJECTION, SOLUTION INTRAMUSCULAR; INTRAVENOUS at 12:49

## 2018-03-19 RX ADMIN — SODIUM CHLORIDE 1000 ML: 900 INJECTION, SOLUTION INTRAVENOUS at 12:26

## 2018-03-19 RX ADMIN — IOPAMIDOL 100 ML: 755 INJECTION, SOLUTION INTRAVENOUS at 13:49

## 2018-03-19 NOTE — ED NOTES
/YANA London Freeze at bedside to provide discharge paperwork. Vital signs stable. Pt in no apparent distress at this time. Mental status at baseline. Ambulatory to waiting room with steady gate, discharge paperwork in hand. Refuses a wheelchair to the front.

## 2018-03-19 NOTE — DISCHARGE INSTRUCTIONS
Abdominal Pain: Care Instructions  Your Care Instructions    Abdominal pain has many possible causes. Some aren't serious and get better on their own in a few days. Others need more testing and treatment. If your pain continues or gets worse, you need to be rechecked and may need more tests to find out what is wrong. You may need surgery to correct the problem. Don't ignore new symptoms, such as fever, nausea and vomiting, urination problems, pain that gets worse, and dizziness. These may be signs of a more serious problem. Your doctor may have recommended a follow-up visit in the next 8 to 12 hours. If you are not getting better, you may need more tests or treatment. The doctor has checked you carefully, but problems can develop later. If you notice any problems or new symptoms, get medical treatment right away. Follow-up care is a key part of your treatment and safety. Be sure to make and go to all appointments, and call your doctor if you are having problems. It's also a good idea to know your test results and keep a list of the medicines you take. How can you care for yourself at home? · Rest until you feel better. · To prevent dehydration, drink plenty of fluids, enough so that your urine is light yellow or clear like water. Choose water and other caffeine-free clear liquids until you feel better. If you have kidney, heart, or liver disease and have to limit fluids, talk with your doctor before you increase the amount of fluids you drink. · If your stomach is upset, eat mild foods, such as rice, dry toast or crackers, bananas, and applesauce. Try eating several small meals instead of two or three large ones. · Wait until 48 hours after all symptoms have gone away before you have spicy foods, alcohol, and drinks that contain caffeine. · Do not eat foods that are high in fat. · Avoid anti-inflammatory medicines such as aspirin, ibuprofen (Advil, Motrin), and naproxen (Aleve).  These can cause stomach upset. Talk to your doctor if you take daily aspirin for another health problem. When should you call for help? Call 911 anytime you think you may need emergency care. For example, call if:  ? · You passed out (lost consciousness). ? · You pass maroon or very bloody stools. ? · You vomit blood or what looks like coffee grounds. ? · You have new, severe belly pain. ?Call your doctor now or seek immediate medical care if:  ? · Your pain gets worse, especially if it becomes focused in one area of your belly. ? · You have a new or higher fever. ? · Your stools are black and look like tar, or they have streaks of blood. ? · You have unexpected vaginal bleeding. ? · You have symptoms of a urinary tract infection. These may include:  ¨ Pain when you urinate. ¨ Urinating more often than usual.  ¨ Blood in your urine. ? · You are dizzy or lightheaded, or you feel like you may faint. ? Watch closely for changes in your health, and be sure to contact your doctor if:  ? · You are not getting better after 1 day (24 hours). Where can you learn more? Go to http://sandrine-shaista.info/. Enter H186 in the search box to learn more about \"Abdominal Pain: Care Instructions. \"  Current as of: March 20, 2017  Content Version: 11.4  © 6709-2032 [employer]. Care instructions adapted under license by [employer] (which disclaims liability or warranty for this information). If you have questions about a medical condition or this instruction, always ask your healthcare professional. [place] [address] any warranty or liability for your use of this information.

## 2018-03-19 NOTE — ED NOTES
Assumed care of pt. Pt. Placed in position of comfort. Call bell within reach. Pt. Made aware of care plan.   Pt came in with c/o abd pain, nausea and vomiting x 3 days

## 2018-03-19 NOTE — ED PROVIDER NOTES
EMERGENCY DEPARTMENT HISTORY AND PHYSICAL EXAM      Date: 3/19/2018  Patient Name: Jhonny Sanchez    History of Presenting Illness     Chief Complaint   Patient presents with    Pelvic Pain     pt reports LLQ pain beginning Thursday, irregular vaginal bleeding x2 weeks, pt reports she feels as though she has swelling to abdomen    Vaginal Bleeding       History Provided By: Patient    HPI: Jhonny Sanchez, 40 y.o. female with PMHx significant for endometrial ablation and irregular menses, presents ambulatory to the ED with cc of gradually worsening left lower abdominal pain x 3 days. She also c/o nausea, vomiting, and diarrhea that started this morning and chills yesterday night. She also c/o intermittent episodes of vaginal bleeding x 2 weeks. She notes that she was spotting this morning. She reports notifying her GYN this morning, and she was referred to the ED. She notes having a change of pregnancy. She denies taking birth control/hormone therapy, history of diverticulitis, colitis, or taking daily medications. She specifically denies vaginal discharge, dysuria, frequency, hematuria, or other complaints at this time. There are no other complaints, changes, or physical findings at this time. PCP: PROVIDER UNKNOWN    Current Outpatient Prescriptions   Medication Sig Dispense Refill    naproxen (NAPROSYN) 500 mg tablet Take 1 Tab by mouth every twelve (12) hours as needed for Pain. 20 Tab 0    dicyclomine (BENTYL) 10 mg capsule Take 1 Cap by mouth four (4) times daily for 5 days. 20 Cap 0    pregabalin (LYRICA) 75 mg capsule Take 1 Cap by mouth two (2) times a day.  Max Daily Amount: 150 mg. 45 Cap 0       Past History     Past Medical History:  Past Medical History:   Diagnosis Date    Anemia     Headache     Hypertension     Obesity     PUD (peptic ulcer disease) 2002    resolved    Sarcoidosis of lymph nodes     Stroke [facility])     patient states TIA       Past Surgical History:  Past Surgical History:   Procedure Laterality Date    HX GYN      Left ovary and fallopian tube removed, D&C, tubal ligation    HX OTHER SURGICAL  2014    Endometrial Ablation, Dr. Holland Hankins HX OTHER SURGICAL  8/2015    lymph node removal    HX OTHER SURGICAL Left 8/27/15    excision of left groin lymph node.  HX TUBAL LIGATION  2005    Dr. Ramona Jon Child       Family History:  Family History   Problem Relation Age of Onset    Hypertension Mother     Cancer Maternal Grandfather      stomach       Social History:  Social History   Substance Use Topics    Smoking status: Current Every Day Smoker     Packs/day: 0.50     Years: 15.00    Smokeless tobacco: Never Used    Alcohol use Yes      Comment: occasionally       Allergies: Allergies   Allergen Reactions    Latex Hives    Motrin [Ibuprofen] Hives     Allergic just to the coating of brand name Motrin, ok to take generic    Tramadol Rash       Review of Systems   Review of Systems   Constitutional: Negative for chills and fever. Respiratory: Negative for cough and shortness of breath. Cardiovascular: Negative for chest pain. Gastrointestinal: Positive for abdominal pain, diarrhea, nausea and vomiting. Negative for constipation. Genitourinary: Positive for vaginal bleeding. Negative for dysuria, frequency, hematuria and vaginal discharge. Musculoskeletal: Negative. Skin: Negative. Neurological: Negative for weakness and numbness. All other systems reviewed and are negative. Physical Exam   Physical Exam   Constitutional: She is oriented to person, place, and time. She appears well-developed and well-nourished. HENT:   Head: Normocephalic and atraumatic. Eyes: Conjunctivae and EOM are normal.   Neck: Normal range of motion. Neck supple. Cardiovascular: Normal rate and regular rhythm. Pulmonary/Chest: Effort normal and breath sounds normal. No respiratory distress. Abdominal: Soft. She exhibits no distension.  There is tenderness in the suprapubic area and left lower quadrant. There is no rebound and no guarding. Positive for left pelvic tenderness. Musculoskeletal: Normal range of motion. Neurological: She is alert and oriented to person, place, and time. Skin: Skin is warm and dry. Psychiatric: She has a normal mood and affect. Nursing note and vitals reviewed.       Diagnostic Study Results   Labs -     Recent Results (from the past 12 hour(s))   URINALYSIS W/ REFLEX CULTURE    Collection Time: 03/19/18 12:40 PM   Result Value Ref Range    Color YELLOW/STRAW      Appearance CLEAR CLEAR      Specific gravity 1.020 1.003 - 1.030      pH (UA) 6.5 5.0 - 8.0      Protein NEGATIVE  NEG mg/dL    Glucose NEGATIVE  NEG mg/dL    Ketone NEGATIVE  NEG mg/dL    Bilirubin NEGATIVE  NEG      Blood NEGATIVE  NEG      Urobilinogen 1.0 0.2 - 1.0 EU/dL    Nitrites NEGATIVE  NEG      Leukocyte Esterase NEGATIVE  NEG      WBC 0-4 0 - 4 /hpf    RBC 0-5 0 - 5 /hpf    Epithelial cells FEW FEW /lpf    Bacteria NEGATIVE  NEG /hpf    UA:UC IF INDICATED CULTURE NOT INDICATED BY UA RESULT CNI     CBC W/O DIFF    Collection Time: 03/19/18 12:40 PM   Result Value Ref Range    WBC 3.3 (L) 3.6 - 11.0 K/uL    RBC 4.09 3.80 - 5.20 M/uL    HGB 12.5 11.5 - 16.0 g/dL    HCT 37.8 35.0 - 47.0 %    MCV 92.4 80.0 - 99.0 FL    MCH 30.6 26.0 - 34.0 PG    MCHC 33.1 30.0 - 36.5 g/dL    RDW 12.6 11.5 - 14.5 %    PLATELET 938 222 - 471 K/uL    MPV 9.8 8.9 - 12.9 FL    NRBC 0.0 0  WBC    ABSOLUTE NRBC 0.00 0.00 - 1.92 K/uL   METABOLIC PANEL, BASIC    Collection Time: 03/19/18 12:40 PM   Result Value Ref Range    Sodium 137 136 - 145 mmol/L    Potassium 4.2 3.5 - 5.1 mmol/L    Chloride 105 97 - 108 mmol/L    CO2 26 21 - 32 mmol/L    Anion gap 6 5 - 15 mmol/L    Glucose 85 65 - 100 mg/dL    BUN 12 6 - 20 MG/DL    Creatinine 0.70 0.55 - 1.02 MG/DL    BUN/Creatinine ratio 17 12 - 20      GFR est AA >60 >60 ml/min/1.73m2    GFR est non-AA >60 >60 ml/min/1.73m2 Calcium 8.9 8.5 - 10.1 MG/DL   HCG URINE, QL. - POC    Collection Time: 03/19/18 12:41 PM   Result Value Ref Range    Pregnancy test,urine (POC) NEGATIVE  NEG         Radiologic Studies -     CT Results  (Last 48 hours)               03/19/18 1349  CT ABD PELV W CONT Final result    Impression:  IMPRESSION:   No acute abdominal or pelvic process seen           Narrative:  EXAM:  CT ABD PELV W CONT       INDICATION: Abdominal pain; LLQ abd pain since Thursday, diarrhea. Vaginal   bleeding for 2 weeks. COMPARISON: 0       CONTRAST:  100 mL of Isovue-370. TECHNIQUE:    Following the uneventful intravenous administration of contrast, thin axial   images were obtained through the abdomen and pelvis. Coronal and sagittal   reconstructions were generated. Oral contrast was not administered. CT dose   reduction was achieved through use of a standardized protocol tailored for this   examination and automatic exposure control for dose modulation. FINDINGS:    LUNG BASES: Clear. INCIDENTALLY IMAGED HEART AND MEDIASTINUM: Unremarkable. LIVER: No mass or biliary dilatation. GALLBLADDER: Unremarkable. SPLEEN: No mass. PANCREAS: No mass or ductal dilatation. ADRENALS: Unremarkable. KIDNEYS: No mass, calculus, or hydronephrosis. STOMACH: Unremarkable. SMALL BOWEL: No dilatation or wall thickening. COLON: No dilatation or wall thickening. APPENDIX: Not visualized   PERITONEUM: No ascites or pneumoperitoneum. Shotty subcentimeter mesenteric   nodes. RETROPERITONEUM: No lymphadenopathy or aortic aneurysm. REPRODUCTIVE ORGANS: Anteverted uterus   URINARY BLADDER: No mass or calculus. BONES: No destructive bone lesion. ADDITIONAL COMMENTS: N/A               Medical Decision Making   I am the first provider for this patient. I reviewed the vital signs, available nursing notes, past medical history, past surgical history, family history and social history.     Vital Signs-Reviewed the patient's vital signs. Patient Vitals for the past 12 hrs:   Temp Pulse Resp BP SpO2   03/19/18 0948 98.9 °F (37.2 °C) (!) 103 16 (!) 143/93 100 %       Records Reviewed: Nursing Notes, Old Medical Records, Previous Radiology Studies and Previous Laboratory Studies    Provider Notes (Medical Decision Making):   Patient presents with abdominal pain in seetting of nvd. DDx: Ovarian cyst, gastroenteritis, SBO, appendicitis, colitis, IBD, diverticulitis, mesenteric ischemia, AAA or descending dissection, ACS, ureteral stone. Will get labs and CT Abdomen. Her irregular menses likely due to pcos, hormonal.     ED Course:   Initial assessment performed. The patients presenting problems have been discussed, and they are in agreement with the care plan formulated and outlined with them. I have encouraged them to ask questions as they arise throughout their visit. Progress Note:  2:10 PM  The patient was informed of her negative lab and imaging results, and she conveys her understanding. Pt notes feeling better after ED treatment. Pt was encouraged to follow up with her GYN regarding her symptoms, and she is in agreement. Pt is stable for discharge. Written by Ofelia Maloney ED Scribe, as dictated by Candy Gallo MD.    Critical Care Time: 0 minutes    Discharge Note:  2:14 PM  The pt is ready for discharge. The pt's signs, symptoms, diagnosis, and discharge instructions have been discussed and pt has conveyed their understanding. The pt is to follow up as recommended or return to ER should their symptoms worsen. Plan has been discussed and pt is in agreement. PLAN:  1. Current Discharge Medication List      START taking these medications    Details   naproxen (NAPROSYN) 500 mg tablet Take 1 Tab by mouth every twelve (12) hours as needed for Pain. Qty: 20 Tab, Refills: 0      dicyclomine (BENTYL) 10 mg capsule Take 1 Cap by mouth four (4) times daily for 5 days. Qty: 20 Cap, Refills: 0           2. Follow-up Information     Follow up With Details Comments Contact Info    Your Gynecologist Schedule an appointment as soon as possible for a visit          Return to ED if worse     Diagnosis     Clinical Impression:   1. LLQ abdominal pain    2. Pelvic pain    3. Irregular menses        Attestations: This note is prepared by Kylee Aleman, acting as a Scribe for Jasmine Grant MD.    Jasmine Grant MD: The scribe's documentation has been prepared under my direction and personally reviewed by me in its entirety. I confirm that the notes above accurately reflects all work, treatment, procedures, and medical decision making performed by me. This note will not be viewable in [address]

## 2018-06-24 ENCOUNTER — APPOINTMENT (OUTPATIENT)
Dept: GENERAL RADIOLOGY | Age: 38
End: 2018-06-24
Attending: EMERGENCY MEDICINE
Payer: COMMERCIAL

## 2018-06-24 ENCOUNTER — HOSPITAL ENCOUNTER (EMERGENCY)
Age: 38
Discharge: HOME | End: 2018-06-24
Attending: STUDENT IN AN ORGANIZED HEALTH CARE EDUCATION/TRAINING PROGRAM | Admitting: STUDENT IN AN ORGANIZED HEALTH CARE EDUCATION/TRAINING PROGRAM
Payer: COMMERCIAL

## 2018-06-24 ENCOUNTER — APPOINTMENT (OUTPATIENT)
Dept: CT IMAGING | Age: 38
End: 2018-06-24
Attending: EMERGENCY MEDICINE
Payer: COMMERCIAL

## 2018-06-24 VITALS
HEIGHT: 62 IN | WEIGHT: 154 LBS | DIASTOLIC BLOOD PRESSURE: 86 MMHG | TEMPERATURE: 98.4 F | OXYGEN SATURATION: 100 % | SYSTOLIC BLOOD PRESSURE: 127 MMHG | RESPIRATION RATE: 15 BRPM | BODY MASS INDEX: 28.34 KG/M2 | HEART RATE: 60 BPM

## 2018-06-24 DIAGNOSIS — R10.12 ABDOMINAL PAIN, LUQ (LEFT UPPER QUADRANT): Primary | ICD-10-CM

## 2018-06-24 LAB
ALBUMIN SERPL-MCNC: 3.7 G/DL (ref 3.5–5)
ALBUMIN/GLOB SERPL: 0.9 {RATIO} (ref 1.1–2.2)
ALP SERPL-CCNC: 53 U/L (ref 45–117)
ALT SERPL-CCNC: 67 U/L (ref 12–78)
ANION GAP SERPL CALC-SCNC: 7 MMOL/L (ref 5–15)
APPEARANCE UR: ABNORMAL
AST SERPL-CCNC: 48 U/L (ref 15–37)
BACTERIA URNS QL MICRO: NEGATIVE /HPF
BASOPHILS # BLD: 0 K/UL (ref 0–0.1)
BASOPHILS NFR BLD: 0 % (ref 0–1)
BILIRUB SERPL-MCNC: 0.5 MG/DL (ref 0.2–1)
BILIRUB UR QL: NEGATIVE
BUN SERPL-MCNC: 8 MG/DL (ref 6–20)
BUN/CREAT SERPL: 10 (ref 12–20)
CALCIUM SERPL-MCNC: 8.8 MG/DL (ref 8.5–10.1)
CHLORIDE SERPL-SCNC: 106 MMOL/L (ref 97–108)
CO2 SERPL-SCNC: 25 MMOL/L (ref 21–32)
COLOR UR: ABNORMAL
CREAT SERPL-MCNC: 0.79 MG/DL (ref 0.55–1.02)
D DIMER PPP FEU-MCNC: 0.2 MG/L FEU (ref 0–0.65)
DIFFERENTIAL METHOD BLD: ABNORMAL
EOSINOPHIL # BLD: 0.2 K/UL (ref 0–0.4)
EOSINOPHIL NFR BLD: 4 % (ref 0–7)
EPITH CASTS URNS QL MICRO: ABNORMAL /LPF
ERYTHROCYTE [DISTWIDTH] IN BLOOD BY AUTOMATED COUNT: 12.8 % (ref 11.5–14.5)
GLOBULIN SER CALC-MCNC: 4.3 G/DL (ref 2–4)
GLUCOSE SERPL-MCNC: 80 MG/DL (ref 65–100)
GLUCOSE UR STRIP.AUTO-MCNC: NEGATIVE MG/DL
HCG UR QL: NEGATIVE
HCT VFR BLD AUTO: 36.6 % (ref 35–47)
HGB BLD-MCNC: 12.1 G/DL (ref 11.5–16)
HGB UR QL STRIP: NEGATIVE
HYALINE CASTS URNS QL MICRO: ABNORMAL /LPF (ref 0–5)
IMM GRANULOCYTES # BLD: 0 K/UL (ref 0–0.04)
IMM GRANULOCYTES NFR BLD AUTO: 1 % (ref 0–0.5)
KETONES UR QL STRIP.AUTO: NEGATIVE MG/DL
LEUKOCYTE ESTERASE UR QL STRIP.AUTO: NEGATIVE
LIPASE SERPL-CCNC: 75 U/L (ref 73–393)
LYMPHOCYTES # BLD: 0.7 K/UL (ref 0.8–3.5)
LYMPHOCYTES NFR BLD: 16 % (ref 12–49)
MCH RBC QN AUTO: 31.2 PG (ref 26–34)
MCHC RBC AUTO-ENTMCNC: 33.1 G/DL (ref 30–36.5)
MCV RBC AUTO: 94.3 FL (ref 80–99)
MONOCYTES # BLD: 0.4 K/UL (ref 0–1)
MONOCYTES NFR BLD: 9 % (ref 5–13)
NEUTS SEG # BLD: 2.8 K/UL (ref 1.8–8)
NEUTS SEG NFR BLD: 70 % (ref 32–75)
NITRITE UR QL STRIP.AUTO: NEGATIVE
NRBC # BLD: 0 K/UL (ref 0–0.01)
NRBC BLD-RTO: 0 PER 100 WBC
PH UR STRIP: 5.5 [PH] (ref 5–8)
PLATELET # BLD AUTO: 203 K/UL (ref 150–400)
PMV BLD AUTO: 9.2 FL (ref 8.9–12.9)
POTASSIUM SERPL-SCNC: 4 MMOL/L (ref 3.5–5.1)
PROT SERPL-MCNC: 8 G/DL (ref 6.4–8.2)
PROT UR STRIP-MCNC: NEGATIVE MG/DL
RBC # BLD AUTO: 3.88 M/UL (ref 3.8–5.2)
RBC #/AREA URNS HPF: ABNORMAL /HPF (ref 0–5)
RBC MORPH BLD: ABNORMAL
SODIUM SERPL-SCNC: 138 MMOL/L (ref 136–145)
SP GR UR REFRACTOMETRY: 1.02 (ref 1–1.03)
UR CULT HOLD, URHOLD: NORMAL
UROBILINOGEN UR QL STRIP.AUTO: 1 EU/DL (ref 0.2–1)
WBC # BLD AUTO: 4.1 K/UL (ref 3.6–11)
WBC URNS QL MICRO: ABNORMAL /HPF (ref 0–4)

## 2018-06-24 PROCEDURE — 85025 COMPLETE CBC W/AUTO DIFF WBC: CPT | Performed by: PHYSICIAN ASSISTANT

## 2018-06-24 PROCEDURE — 83690 ASSAY OF LIPASE: CPT | Performed by: PHYSICIAN ASSISTANT

## 2018-06-24 PROCEDURE — 74011636320 HC RX REV CODE- 636/320: Performed by: STUDENT IN AN ORGANIZED HEALTH CARE EDUCATION/TRAINING PROGRAM

## 2018-06-24 PROCEDURE — 36415 COLL VENOUS BLD VENIPUNCTURE: CPT | Performed by: EMERGENCY MEDICINE

## 2018-06-24 PROCEDURE — 74011250636 HC RX REV CODE- 250/636: Performed by: EMERGENCY MEDICINE

## 2018-06-24 PROCEDURE — 96361 HYDRATE IV INFUSION ADD-ON: CPT

## 2018-06-24 PROCEDURE — 80053 COMPREHEN METABOLIC PANEL: CPT | Performed by: PHYSICIAN ASSISTANT

## 2018-06-24 PROCEDURE — 85379 FIBRIN DEGRADATION QUANT: CPT | Performed by: EMERGENCY MEDICINE

## 2018-06-24 PROCEDURE — 99284 EMERGENCY DEPT VISIT MOD MDM: CPT

## 2018-06-24 PROCEDURE — 74011250636 HC RX REV CODE- 250/636: Performed by: PHYSICIAN ASSISTANT

## 2018-06-24 PROCEDURE — 96375 TX/PRO/DX INJ NEW DRUG ADDON: CPT

## 2018-06-24 PROCEDURE — 81001 URINALYSIS AUTO W/SCOPE: CPT | Performed by: PHYSICIAN ASSISTANT

## 2018-06-24 PROCEDURE — 96374 THER/PROPH/DIAG INJ IV PUSH: CPT

## 2018-06-24 PROCEDURE — 74011000250 HC RX REV CODE- 250: Performed by: PHYSICIAN ASSISTANT

## 2018-06-24 PROCEDURE — 71046 X-RAY EXAM CHEST 2 VIEWS: CPT

## 2018-06-24 PROCEDURE — 74011000258 HC RX REV CODE- 258: Performed by: STUDENT IN AN ORGANIZED HEALTH CARE EDUCATION/TRAINING PROGRAM

## 2018-06-24 PROCEDURE — 81025 URINE PREGNANCY TEST: CPT

## 2018-06-24 PROCEDURE — 74177 CT ABD & PELVIS W/CONTRAST: CPT

## 2018-06-24 RX ORDER — ONDANSETRON 4 MG/1
4 TABLET, ORALLY DISINTEGRATING ORAL
Qty: 10 TAB | Refills: 0 | Status: SHIPPED | OUTPATIENT
Start: 2018-06-24 | End: 2018-11-26

## 2018-06-24 RX ORDER — MORPHINE SULFATE 1 MG/ML
2 INJECTION, SOLUTION EPIDURAL; INTRATHECAL; INTRAVENOUS ONCE
Status: COMPLETED | OUTPATIENT
Start: 2018-06-24 | End: 2018-06-24

## 2018-06-24 RX ORDER — ONDANSETRON 2 MG/ML
4 INJECTION INTRAMUSCULAR; INTRAVENOUS
Status: COMPLETED | OUTPATIENT
Start: 2018-06-24 | End: 2018-06-24

## 2018-06-24 RX ORDER — FAMOTIDINE 20 MG/1
20 TABLET, FILM COATED ORAL 2 TIMES DAILY
Qty: 20 TAB | Refills: 0 | Status: SHIPPED | OUTPATIENT
Start: 2018-06-24 | End: 2018-11-26

## 2018-06-24 RX ORDER — HYDROCODONE BITARTRATE AND ACETAMINOPHEN 5; 325 MG/1; MG/1
1 TABLET ORAL
Qty: 8 TAB | Refills: 0 | Status: SHIPPED | OUTPATIENT
Start: 2018-06-24 | End: 2018-11-26

## 2018-06-24 RX ORDER — SODIUM CHLORIDE 0.9 % (FLUSH) 0.9 %
10 SYRINGE (ML) INJECTION
Status: COMPLETED | OUTPATIENT
Start: 2018-06-24 | End: 2018-06-24

## 2018-06-24 RX ADMIN — IOPAMIDOL 100 ML: 755 INJECTION, SOLUTION INTRAVENOUS at 13:31

## 2018-06-24 RX ADMIN — SODIUM CHLORIDE 1000 ML: 900 INJECTION, SOLUTION INTRAVENOUS at 11:49

## 2018-06-24 RX ADMIN — ONDANSETRON 4 MG: 2 INJECTION INTRAMUSCULAR; INTRAVENOUS at 11:49

## 2018-06-24 RX ADMIN — Medication 10 ML: at 13:31

## 2018-06-24 RX ADMIN — Medication 2 MG: at 14:45

## 2018-06-24 RX ADMIN — FAMOTIDINE 20 MG: 10 INJECTION, SOLUTION INTRAVENOUS at 11:49

## 2018-06-24 RX ADMIN — SODIUM CHLORIDE 100 ML: 900 INJECTION, SOLUTION INTRAVENOUS at 13:31

## 2018-06-24 NOTE — DISCHARGE INSTRUCTIONS
Abdominal Pain: Care Instructions  Your Care Instructions    Abdominal pain has many possible causes. Some aren't serious and get better on their own in a few days. Others need more testing and treatment. If your pain continues or gets worse, you need to be rechecked and may need more tests to find out what is wrong. You may need surgery to correct the problem. Don't ignore new symptoms, such as fever, nausea and vomiting, urination problems, pain that gets worse, and dizziness. These may be signs of a more serious problem. Your doctor may have recommended a follow-up visit in the next 8 to 12 hours. If you are not getting better, you may need more tests or treatment. The doctor has checked you carefully, but problems can develop later. If you notice any problems or new symptoms, get medical treatment right away. Follow-up care is a key part of your treatment and safety. Be sure to make and go to all appointments, and call your doctor if you are having problems. It's also a good idea to know your test results and keep a list of the medicines you take. How can you care for yourself at home? · Rest until you feel better. · To prevent dehydration, drink plenty of fluids, enough so that your urine is light yellow or clear like water. Choose water and other caffeine-free clear liquids until you feel better. If you have kidney, heart, or liver disease and have to limit fluids, talk with your doctor before you increase the amount of fluids you drink. · If your stomach is upset, eat mild foods, such as rice, dry toast or crackers, bananas, and applesauce. Try eating several small meals instead of two or three large ones. · Wait until 48 hours after all symptoms have gone away before you have spicy foods, alcohol, and drinks that contain caffeine. · Do not eat foods that are high in fat. · Avoid anti-inflammatory medicines such as aspirin, ibuprofen (Advil, Motrin), and naproxen (Aleve).  These can cause stomach upset. Talk to your doctor if you take daily aspirin for another health problem. When should you call for help? Call 911 anytime you think you may need emergency care. For example, call if:  ? · You passed out (lost consciousness). ? · You pass maroon or very bloody stools. ? · You vomit blood or what looks like coffee grounds. ? · You have new, severe belly pain. ?Call your doctor now or seek immediate medical care if:  ? · Your pain gets worse, especially if it becomes focused in one area of your belly. ? · You have a new or higher fever. ? · Your stools are black and look like tar, or they have streaks of blood. ? · You have unexpected vaginal bleeding. ? · You have symptoms of a urinary tract infection. These may include:  ¨ Pain when you urinate. ¨ Urinating more often than usual.  ¨ Blood in your urine. ? · You are dizzy or lightheaded, or you feel like you may faint. ? Watch closely for changes in your health, and be sure to contact your doctor if:  ? · You are not getting better after 1 day (24 hours). Where can you learn more? Go to http://sandrine[employer]part[employer] Bioscienceshaista.info/. Enter S885 in the search box to learn more about \"Abdominal Pain: Care Instructions. \"  Current as of: March 20, 2017  Content Version: 11.4  © 4270-3967 [employer]. Care instructions adapted under license by [employer] (which disclaims liability or warranty for this information). If you have questions about a medical condition or this instruction, always ask your healthcare professional. [place] [address] any warranty or liability for your use of this information. Diarrhea: Care Instructions  Your Care Instructions    Diarrhea is loose, watery stools (bowel movements). The exact cause is often hard to find. Sometimes diarrhea is your body's way of getting rid of what caused an upset stomach.  Viruses, food poisoning, and many medicines can cause diarrhea. Some people get diarrhea in response to emotional stress, anxiety, or certain foods. Almost everyone has diarrhea now and then. It usually isn't serious, and your stools will return to normal soon. The important thing to do is replace the fluids you have lost, so you can prevent dehydration. The doctor has checked you carefully, but problems can develop later. If you notice any problems or new symptoms, get medical treatment right away. Follow-up care is a key part of your treatment and safety. Be sure to make and go to all appointments, and call your doctor if you are having problems. It's also a good idea to know your test results and keep a list of the medicines you take. How can you care for yourself at home? · Watch for signs of dehydration, which means your body has lost too much water. Dehydration is a serious condition and should be treated right away. Signs of dehydration are:  ¨ Increasing thirst and dry eyes and mouth. ¨ Feeling faint or lightheaded. ¨ Darker urine, and a smaller amount of urine than normal.  · To prevent dehydration, drink plenty of fluids, enough so that your urine is light yellow or clear like water. Choose water and other caffeine-free clear liquids until you feel better. If you have kidney, heart, or liver disease and have to limit fluids, talk with your doctor before you increase the amount of fluids you drink. · Begin eating small amounts of mild foods the next day, if you feel like it. ¨ Try yogurt that has live cultures of Lactobacillus. (Check the label.)  ¨ Avoid spicy foods, fruits, alcohol, and caffeine until 48 hours after all symptoms are gone. ¨ Avoid chewing gum that contains sorbitol. ¨ Avoid dairy products (except for yogurt with Lactobacillus) while you have diarrhea and for 3 days after symptoms are gone. · The doctor may recommend that you take over-the-counter medicine, such as loperamide (Imodium), if you still have diarrhea after 6 hours. Read and follow all instructions on the label. Do not use this medicine if you have bloody diarrhea, a high fever, or other signs of serious illness. Call your doctor if you think you are having a problem with your medicine. When should you call for help? Call 911 anytime you think you may need emergency care. For example, call if:  ? · You passed out (lost consciousness). ? · Your stools are maroon or very bloody. ?Call your doctor now or seek immediate medical care if:  ? · You are dizzy or lightheaded, or you feel like you may faint. ? · Your stools are black and look like tar, or they have streaks of blood. ? · You have new or worse belly pain. ? · You have symptoms of dehydration, such as:  ¨ Dry eyes and a dry mouth. ¨ Passing only a little dark urine. ¨ Feeling thirstier than usual.   ? · You have a new or higher fever. ? Watch closely for changes in your health, and be sure to contact your doctor if:  ? · Your diarrhea is getting worse. ? · You see pus in the diarrhea. ? · You are not getting better after 2 days (48 hours). Where can you learn more? Go to http://sandrine-shaista.info/. Enter P538 in the search box to learn more about \"Diarrhea: Care Instructions. \"  Current as of: March 20, 2017  Content Version: 11.4  © 7552-2652 [employer]. Care instructions adapted under license by [employer] (which disclaims liability or warranty for this information). If you have questions about a medical condition or this instruction, always ask your healthcare professional. [place] [address] any warranty or liability for your use of this information.

## 2018-06-24 NOTE — ED NOTES
11:11 AM  I have evaluated the patient as the Provider in Triage. I have reviewed Her vital signs and the triage nurse assessment. I have talked with the patient and any available family and advised that I am the provider in triage and have ordered the appropriate study to initiate their work up based on the clinical presentation during my assessment. I have advised that the patient will be accommodated in the Main ED as soon as possible. I have also requested to contact the triage nurse or myself immediately if the patient experiences any changes in their condition during this brief waiting period. Pt complaining of steatorrhea; epigastric/chest pain radiating into her back. No hx of pancreatitis. No cardiac hx.       Adriana Sanchez PA-C

## 2018-06-24 NOTE — ED TRIAGE NOTES
Pt reports having mid to lower abdominal pain for the past 2 weeks with light brown loose stool. Pt also reports chest pain that radiates into mid back and into left ribcage area with SOB.

## 2018-06-24 NOTE — ED PROVIDER NOTES
HPI Comments: 52-year-old female presents to the emergency room for multiple complaints. Patient has been complaining of left upper quadrant pain for the past 2 weeks. Pain is intermittent in nature but occurs after eating. Pain is described as a burning sensation that radiates into the back. During this time patient has had 3 episodes of vomiting. She states 2 of them were streaked with blood. No vomiting of only blood. Patient is also complaining of loose diarrhea. Color of the stool is light yellow and appears greasy. Pt reports subjective fever. Patient has also had a cough. No dysuria frequency or urgency. No headache,  congestion or runny nose. Patient reports shortness of breath is worse when she walks. She does not have chest pain. There are no known precipitating event. When the pain occurs no alleviating factors. Social hx  +smoker  Occasional alcohol      The history is provided by the patient. Past Medical History:   Diagnosis Date    Anemia     Headache     Hypertension     Obesity     PUD (peptic ulcer disease)     resolved    Sarcoidosis of lymph nodes     Stroke [facility])     patient states TIA       Past Surgical History:   Procedure Laterality Date    HX GYN      Left ovary and fallopian tube removed, D&C, tubal ligation    HX OTHER SURGICAL  2014    Endometrial Ablation, Dr. Leonardo Mcintyre HX OTHER SURGICAL  2015    lymph node removal    HX OTHER SURGICAL Left 8/27/15    excision of left groin lymph node.  HX TUBAL LIGATION      Dr. Eduardo Jon Cozad         Family History:   Problem Relation Age of Onset    Hypertension Mother     Cancer Maternal Grandfather      stomach       Social History     Social History    Marital status: LEGALLY      Spouse name: N/A    Number of children: N/A    Years of education: N/A     Occupational History    Not on file.      Social History Main Topics    Smoking status: Current Every Day Smoker     Packs/day: 0.50 Years: 15.00    Smokeless tobacco: Never Used    Alcohol use Yes      Comment: occasionally    Drug use: No    Sexual activity: Not on file     Other Topics Concern    Not on file     Social History Narrative         ALLERGIES: Latex; Motrin [ibuprofen]; and Tramadol    Review of Systems   Constitutional: Negative for chills and fever. HENT: Negative for congestion, rhinorrhea and sore throat. Respiratory: Negative for cough and shortness of breath. Cardiovascular: Negative for chest pain and palpitations. Gastrointestinal: Positive for abdominal pain, diarrhea, nausea and vomiting. Negative for blood in stool. Genitourinary: Negative for dysuria, flank pain, frequency and urgency. Musculoskeletal: Negative for arthralgias, myalgias, neck pain and neck stiffness. Skin: Negative for rash and wound. Neurological: Negative for dizziness, numbness and headaches. All other systems reviewed and are negative. Vitals:    06/24/18 1111   BP: (!) 137/102   Pulse: 84   Resp: 16   Temp: 98.7 °F (37.1 °C)   SpO2: 100%   Weight: 69.9 kg (154 lb)   Height: 5' 2\" (1.575 m)            Physical Exam   Constitutional: She is oriented to person, place, and time. She appears well-developed and well-nourished. No distress. HENT:   Head: Normocephalic and atraumatic. Right Ear: External ear normal.   Left Ear: External ear normal.   Eyes: Conjunctivae and EOM are normal. Pupils are equal, round, and reactive to light. Neck: Normal range of motion. Neck supple. Cardiovascular: Normal rate and regular rhythm. Pulmonary/Chest: Effort normal and breath sounds normal. No respiratory distress. She has no wheezes. Abdominal: Soft. Normal appearance and bowel sounds are normal. She exhibits no shifting dullness, no distension, no pulsatile liver, no abdominal bruit, no pulsatile midline mass and no mass. There is no hepatosplenomegaly, splenomegaly or hepatomegaly. There is tenderness.  There is no rigidity, no rebound, no guarding, no CVA tenderness, no tenderness at McBurney's point and negative Jaimes's sign. Pt tender to palpation luq and left side of abdomen. Soft, no peritoneal signs. Musculoskeletal: Normal range of motion. She exhibits no edema or tenderness. Neurological: She is alert and oriented to person, place, and time. She has normal reflexes. No cranial nerve deficit. Coordination normal.   Skin: Skin is warm and dry. No rash noted. No erythema. Psychiatric: She has a normal mood and affect. Her behavior is normal. Judgment and thought content normal.   Nursing note and vitals reviewed. MDM  Number of Diagnoses or Management Options  Abdominal pain, LUQ (left upper quadrant):   Diagnosis management comments: 46-year-old female presenting with multiple complaints. The patient's main complaint is pain in the left upper quadrant worse after eating. Abdomen soft nondistended. No peritoneal signs. Mild tenderness to the left upper quadrant. Her lungs are clear. Nontoxic appearing. Plan: Labs, chest x-ray, CT, intravenous fluids, Zofran and Pepcid    3:28 PM  Pain improved after medicine. Pt tolerating po. Patient is well hydrated, well appearing, and in no respiratory distress. Physical exam is reassuring, and without signs of serious illness. Given the patient's history, clinical course, physical exam, and imaging findings, abdominal pain is unlikely secondary to a surgical etiology. Patient will be discharged home with pain control and follow-up with primary care physician in one to two days. Patient and caregivers were instructed on signs and symptoms of reasons to return including fever, worsening pain, vomiting, blood in the stool or any other concerns. Standard narcotic and sedating medication warnings given  Patient's results have been reviewed with them.   Patient and/or family have verbally conveyed their understanding and agreement of the patient's signs, symptoms, diagnosis, treatment and prognosis and additionally agree to follow up as recommended or return to the Emergency Room should their condition change prior to follow-up. Discharge instructions have also been provided to the patient with some educational information regarding their diagnosis as well a list of reasons why they would want to return to the ER prior to their follow-up appointment should their condition change. Amount and/or Complexity of Data Reviewed  Discuss the patient with other providers: yes (ER Attending-Penny)    Patient Progress  Patient progress: stable        ED Course       Procedures      Pt case including HPI, PE, and all available lab and radiology results has been discussed with attending physician. Opportunity to evaluate patient has been provided to ER attending. Discharge and prescription plan has been agreed upon.

## 2018-11-26 ENCOUNTER — HOSPITAL ENCOUNTER (EMERGENCY)
Age: 38
Discharge: HOME | End: 2018-11-26
Attending: EMERGENCY MEDICINE
Payer: SELF-PAY

## 2018-11-26 ENCOUNTER — APPOINTMENT (OUTPATIENT)
Dept: CT IMAGING | Age: 38
End: 2018-11-26
Attending: PHYSICIAN ASSISTANT
Payer: SELF-PAY

## 2018-11-26 VITALS
TEMPERATURE: 98.6 F | WEIGHT: 149.91 LBS | HEART RATE: 80 BPM | OXYGEN SATURATION: 100 % | HEIGHT: 62 IN | SYSTOLIC BLOOD PRESSURE: 122 MMHG | RESPIRATION RATE: 16 BRPM | BODY MASS INDEX: 27.59 KG/M2 | DIASTOLIC BLOOD PRESSURE: 87 MMHG

## 2018-11-26 DIAGNOSIS — R10.31 ABDOMINAL PAIN, RIGHT LOWER QUADRANT: Primary | ICD-10-CM

## 2018-11-26 DIAGNOSIS — N39.0 URINARY TRACT INFECTION WITHOUT HEMATURIA, SITE UNSPECIFIED: ICD-10-CM

## 2018-11-26 DIAGNOSIS — D86.9 SARCOIDOSIS: ICD-10-CM

## 2018-11-26 LAB
ALBUMIN SERPL-MCNC: 3.6 G/DL (ref 3.5–5)
ALBUMIN/GLOB SERPL: 0.9 {RATIO} (ref 1.1–2.2)
ALP SERPL-CCNC: 53 U/L (ref 45–117)
ALT SERPL-CCNC: 47 U/L (ref 12–78)
ANION GAP SERPL CALC-SCNC: 9 MMOL/L (ref 5–15)
APPEARANCE UR: CLEAR
AST SERPL-CCNC: 26 U/L (ref 15–37)
BACTERIA URNS QL MICRO: ABNORMAL /HPF
BASOPHILS # BLD: 0 K/UL (ref 0–0.1)
BASOPHILS NFR BLD: 0 % (ref 0–1)
BILIRUB SERPL-MCNC: 0.3 MG/DL (ref 0.2–1)
BILIRUB UR QL: NEGATIVE
BUN SERPL-MCNC: 8 MG/DL (ref 6–20)
BUN/CREAT SERPL: 12 (ref 12–20)
CALCIUM SERPL-MCNC: 9 MG/DL (ref 8.5–10.1)
CHLORIDE SERPL-SCNC: 101 MMOL/L (ref 97–108)
CO2 SERPL-SCNC: 25 MMOL/L (ref 21–32)
COLOR UR: ABNORMAL
CREAT SERPL-MCNC: 0.69 MG/DL (ref 0.55–1.02)
DIFFERENTIAL METHOD BLD: ABNORMAL
EOSINOPHIL # BLD: 0.2 K/UL (ref 0–0.4)
EOSINOPHIL NFR BLD: 4 % (ref 0–7)
EPITH CASTS URNS QL MICRO: ABNORMAL /LPF
ERYTHROCYTE [DISTWIDTH] IN BLOOD BY AUTOMATED COUNT: 12.3 % (ref 11.5–14.5)
GLOBULIN SER CALC-MCNC: 4 G/DL (ref 2–4)
GLUCOSE SERPL-MCNC: 92 MG/DL (ref 65–100)
GLUCOSE UR STRIP.AUTO-MCNC: NEGATIVE MG/DL
HCG UR QL: NEGATIVE
HCT VFR BLD AUTO: 36.9 % (ref 35–47)
HGB BLD-MCNC: 12.3 G/DL (ref 11.5–16)
HGB UR QL STRIP: NEGATIVE
IMM GRANULOCYTES # BLD: 0 K/UL (ref 0–0.04)
IMM GRANULOCYTES NFR BLD AUTO: 0 % (ref 0–0.5)
KETONES UR QL STRIP.AUTO: NEGATIVE MG/DL
LEUKOCYTE ESTERASE UR QL STRIP.AUTO: NEGATIVE
LYMPHOCYTES # BLD: 0.6 K/UL (ref 0.8–3.5)
LYMPHOCYTES NFR BLD: 15 % (ref 12–49)
MCH RBC QN AUTO: 30.5 PG (ref 26–34)
MCHC RBC AUTO-ENTMCNC: 33.3 G/DL (ref 30–36.5)
MCV RBC AUTO: 91.6 FL (ref 80–99)
MONOCYTES # BLD: 0.3 K/UL (ref 0–1)
MONOCYTES NFR BLD: 9 % (ref 5–13)
NEUTS SEG # BLD: 2.7 K/UL (ref 1.8–8)
NEUTS SEG NFR BLD: 72 % (ref 32–75)
NITRITE UR QL STRIP.AUTO: NEGATIVE
NRBC # BLD: 0 K/UL (ref 0–0.01)
NRBC BLD-RTO: 0 PER 100 WBC
PH UR STRIP: 6 [PH] (ref 5–8)
PLATELET # BLD AUTO: 219 K/UL (ref 150–400)
PMV BLD AUTO: 9 FL (ref 8.9–12.9)
POTASSIUM SERPL-SCNC: 3.8 MMOL/L (ref 3.5–5.1)
PROT SERPL-MCNC: 7.6 G/DL (ref 6.4–8.2)
PROT UR STRIP-MCNC: NEGATIVE MG/DL
RBC # BLD AUTO: 4.03 M/UL (ref 3.8–5.2)
RBC #/AREA URNS HPF: ABNORMAL /HPF (ref 0–5)
RBC MORPH BLD: ABNORMAL
SODIUM SERPL-SCNC: 135 MMOL/L (ref 136–145)
SP GR UR REFRACTOMETRY: 1.02 (ref 1–1.03)
UA: UC IF INDICATED,UAUC: ABNORMAL
UROBILINOGEN UR QL STRIP.AUTO: 0.2 EU/DL (ref 0.2–1)
WBC # BLD AUTO: 3.8 K/UL (ref 3.6–11)
WBC URNS QL MICRO: ABNORMAL /HPF (ref 0–4)

## 2018-11-26 PROCEDURE — 74177 CT ABD & PELVIS W/CONTRAST: CPT

## 2018-11-26 PROCEDURE — 96361 HYDRATE IV INFUSION ADD-ON: CPT

## 2018-11-26 PROCEDURE — 99284 EMERGENCY DEPT VISIT MOD MDM: CPT

## 2018-11-26 PROCEDURE — 81001 URINALYSIS AUTO W/SCOPE: CPT

## 2018-11-26 PROCEDURE — 74011250636 HC RX REV CODE- 250/636: Performed by: PHYSICIAN ASSISTANT

## 2018-11-26 PROCEDURE — 96360 HYDRATION IV INFUSION INIT: CPT

## 2018-11-26 PROCEDURE — 80053 COMPREHEN METABOLIC PANEL: CPT

## 2018-11-26 PROCEDURE — 81025 URINE PREGNANCY TEST: CPT

## 2018-11-26 PROCEDURE — 74011636320 HC RX REV CODE- 636/320: Performed by: EMERGENCY MEDICINE

## 2018-11-26 PROCEDURE — 36415 COLL VENOUS BLD VENIPUNCTURE: CPT

## 2018-11-26 PROCEDURE — 74011250637 HC RX REV CODE- 250/637: Performed by: PHYSICIAN ASSISTANT

## 2018-11-26 PROCEDURE — 87086 URINE CULTURE/COLONY COUNT: CPT

## 2018-11-26 PROCEDURE — 85025 COMPLETE CBC W/AUTO DIFF WBC: CPT

## 2018-11-26 RX ORDER — SODIUM CHLORIDE 0.9 % (FLUSH) 0.9 %
5-10 SYRINGE (ML) INJECTION EVERY 8 HOURS
Status: DISCONTINUED | OUTPATIENT
Start: 2018-11-26 | End: 2018-11-26 | Stop reason: HOSPADM

## 2018-11-26 RX ORDER — ONDANSETRON 4 MG/1
4 TABLET, ORALLY DISINTEGRATING ORAL
Status: COMPLETED | OUTPATIENT
Start: 2018-11-26 | End: 2018-11-26

## 2018-11-26 RX ORDER — SODIUM CHLORIDE 0.9 % (FLUSH) 0.9 %
5-10 SYRINGE (ML) INJECTION AS NEEDED
Status: DISCONTINUED | OUTPATIENT
Start: 2018-11-26 | End: 2018-11-26 | Stop reason: HOSPADM

## 2018-11-26 RX ORDER — DICYCLOMINE HYDROCHLORIDE 10 MG/1
10 CAPSULE ORAL
Status: COMPLETED | OUTPATIENT
Start: 2018-11-26 | End: 2018-11-26

## 2018-11-26 RX ORDER — CEPHALEXIN 500 MG/1
500 CAPSULE ORAL 2 TIMES DAILY
Qty: 14 CAP | Refills: 0 | Status: SHIPPED | OUTPATIENT
Start: 2018-11-26 | End: 2018-12-03

## 2018-11-26 RX ORDER — DICYCLOMINE HYDROCHLORIDE 20 MG/1
20 TABLET ORAL
Qty: 20 TAB | Refills: 0 | Status: SHIPPED | OUTPATIENT
Start: 2018-11-26 | End: 2019-09-06

## 2018-11-26 RX ORDER — SODIUM CHLORIDE 0.9 % (FLUSH) 0.9 %
5-10 SYRINGE (ML) INJECTION
Status: COMPLETED | OUTPATIENT
Start: 2018-11-26 | End: 2018-11-26

## 2018-11-26 RX ORDER — ONDANSETRON 4 MG/1
4 TABLET, ORALLY DISINTEGRATING ORAL
Qty: 8 TAB | Refills: 0 | Status: SHIPPED | OUTPATIENT
Start: 2018-11-26 | End: 2019-09-06

## 2018-11-26 RX ADMIN — ONDANSETRON 4 MG: 4 TABLET, ORALLY DISINTEGRATING ORAL at 11:57

## 2018-11-26 RX ADMIN — DICYCLOMINE HYDROCHLORIDE 10 MG: 10 CAPSULE ORAL at 13:33

## 2018-11-26 RX ADMIN — SODIUM CHLORIDE 1000 ML: 900 INJECTION, SOLUTION INTRAVENOUS at 12:00

## 2018-11-26 RX ADMIN — Medication 10 ML: at 12:26

## 2018-11-26 RX ADMIN — IOPAMIDOL 100 ML: 755 INJECTION, SOLUTION INTRAVENOUS at 12:26

## 2018-11-26 NOTE — DISCHARGE INSTRUCTIONS

## 2018-11-26 NOTE — ED NOTES
Emergency Department Nursing Plan of Care The Nursing Plan of Care is developed from the Nursing assessment and Emergency Department Attending provider initial evaluation. The plan of care may be reviewed in the ED Provider note. The Plan of Care was developed with the following considerations:  
Patient / Family readiness to learn indicated by:verbalized understanding Persons(s) to be included in education: patient Barriers to Learning/Limitations:No 
 
Signed Zenaida Payne RN   
11/26/2018   11:29 AM
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
## 2018-11-26 NOTE — ED TRIAGE NOTES
Reports lower right sided abd pain into lower back x \"awhile\" that became worse x3 days. Reports constipation and nausea. Last regular BM 2 wks ago. LMP Oct 23rd.

## 2018-11-27 LAB
BACTERIA SPEC CULT: NORMAL
CC UR VC: NORMAL
SERVICE CMNT-IMP: NORMAL

## 2019-03-22 ENCOUNTER — HOSPITAL ENCOUNTER (EMERGENCY)
Age: 39
Discharge: HOME | End: 2019-03-22
Attending: EMERGENCY MEDICINE | Admitting: EMERGENCY MEDICINE
Payer: COMMERCIAL

## 2019-03-22 VITALS
WEIGHT: 154.32 LBS | HEART RATE: 84 BPM | TEMPERATURE: 98.5 F | SYSTOLIC BLOOD PRESSURE: 122 MMHG | HEIGHT: 60 IN | BODY MASS INDEX: 30.3 KG/M2 | DIASTOLIC BLOOD PRESSURE: 76 MMHG | RESPIRATION RATE: 16 BRPM | OXYGEN SATURATION: 100 %

## 2019-03-22 DIAGNOSIS — L25.9 CONTACT DERMATITIS, UNSPECIFIED CONTACT DERMATITIS TYPE, UNSPECIFIED TRIGGER: Primary | ICD-10-CM

## 2019-03-22 PROCEDURE — 99282 EMERGENCY DEPT VISIT SF MDM: CPT

## 2019-03-22 RX ORDER — TRIAMCINOLONE ACETONIDE 1 MG/ML
LOTION TOPICAL 3 TIMES DAILY
Qty: 60 ML | Refills: 0 | Status: SHIPPED | OUTPATIENT
Start: 2019-03-22 | End: 2019-09-06

## 2019-03-22 RX ORDER — PREDNISONE 50 MG/1
50 TABLET ORAL DAILY
Qty: 5 TAB | Refills: 0 | Status: SHIPPED | OUTPATIENT
Start: 2019-03-22 | End: 2019-03-27

## 2019-03-22 RX ORDER — CETIRIZINE HCL 10 MG
10 TABLET ORAL 2 TIMES DAILY
Qty: 14 TAB | Refills: 0 | Status: SHIPPED | OUTPATIENT
Start: 2019-03-22 | End: 2019-03-29

## 2019-03-22 NOTE — DISCHARGE INSTRUCTIONS
Patient Education        Dermatitis: Care Instructions  Your Care Instructions  Dermatitis is the general name used for any rash or inflammation of the skin. Different kinds of dermatitis cause different kinds of rashes. Common causes of a rash include new medicines, plants (such as poison oak or poison ivy), heat, and stress. Certain illnesses can also cause a rash. An allergic reaction to something that touches your skin, such as latex, nickel, or poison ivy, is called contact dermatitis. Contact dermatitis may also be caused by something that irritates the skin, such as bleach, a chemical, or soap. These types of rashes cannot be spread from person to person. How long your rash will last depends on what caused it. Rashes may last a few days or months. Follow-up care is a key part of your treatment and safety. Be sure to make and go to all appointments, and call your doctor if you are having problems. It's also a good idea to know your test results and keep a list of the medicines you take. How can you care for yourself at home? · Do not scratch the rash. Cut your nails short, and file them smooth. Or wear gloves if this helps keep you from scratching. · Wash the area with water only. Pat dry. · Put cold, wet cloths on the rash to reduce itching. · Keep cool, and stay out of the sun. · Leave the rash open to the air as much as possible. · If the rash itches, use hydrocortisone cream. Follow the directions on the label. Calamine lotion may help for plant rashes. · Take an over-the-counter antihistamine, such as diphenhydramine (Benadryl) or loratadine (Claritin), to help calm the itching. Read and follow all instructions on the label. · If your doctor prescribed a cream, use it as directed. If your doctor prescribed medicine, take it exactly as directed. When should you call for help?   Call your doctor now or seek immediate medical care if:    · You have symptoms of infection, such as:  ? Increased pain, swelling, warmth, or redness. ? Red streaks leading from the area. ? Pus draining from the area. ? A fever.     · You have joint pain along with the rash.    Watch closely for changes in your health, and be sure to contact your doctor if:    · Your rash is changing or getting worse.     · You are not getting better as expected. Where can you learn more? Go to http://sandrine-shaista.info/. Enter ([phone] in the search box to learn more about \"Dermatitis: Care Instructions. \"  Current as of: April 17, 2018  Content Version: 11.9  © 0993-2556 [employer], [place]. Care instructions adapted under license by [employer] (which disclaims liability or warranty for this information). If you have questions about a medical condition or this instruction, always ask your healthcare professional. [address] any warranty or liability for your use of this information.

## 2019-03-22 NOTE — ED PROVIDER NOTES
EMERGENCY DEPARTMENT HISTORY AND PHYSICAL EXAM 
 
 
Date: 3/22/2019 Patient Name: Sammy Greene History of Presenting Illness Chief Complaint Patient presents with  Rash  
  to bilateral arms and chest onset last night, denies changes in medications, soaps, lotions, and detergents History Provided By: Patient HPI: Sammy Greene, 41 y.o. female with PMHx significant for sarcoidosis, presents to the ED with cc of rash to chest and bilateral arms onset last night. The rash is pruritic and gradually worsening. She has not tried any medications to help with the symptoms. She denies any new exposures or changes in medications, soaps, detergents, or lotions. She ate seafood yesterday but has eaten this multiple times before and has never had a problem with it. She denies fever, shortness of breath, throat swelling, abdominal pain. PCP: Unknown, Provider There are no other complaints, changes, or physical findings at this time. Current Outpatient Medications Medication Sig Dispense Refill  predniSONE (DELTASONE) 50 mg tablet Take 1 Tab by mouth daily for 5 days. 5 Tab 0  
 cetirizine (ZYRTEC) 10 mg tablet Take 1 Tab by mouth two (2) times a day for 7 days. 14 Tab 0  
 triamcinolone (KENALOG) 0.1 % lotion Apply  to affected area three (3) times daily. use thin layer 60 mL 0  
 ondansetron (ZOFRAN ODT) 4 mg disintegrating tablet Take 1 Tab by mouth every eight (8) hours as needed for Nausea. 8 Tab 0  
 dicyclomine (BENTYL) 20 mg tablet Take 1 Tab by mouth every six (6) hours as needed (abdominal cramps) for up to 20 doses. 20 Tab 0 Past History Past Medical History: 
Past Medical History:  
Diagnosis Date  Anemia  Headache  Hypertension  Obesity  PUD (peptic ulcer disease) 2002  
 resolved  Sarcoidosis of lymph nodes  Stroke [facility])   
 patient states TIA Past Surgical History: 
Past Surgical History:  
Procedure Laterality Date  HX GYN Left ovary and fallopian tube removed, D&C, tubal ligation  HX OTHER SURGICAL  2014 Endometrial Ablation, Dr. Tavo Castillo HX OTHER SURGICAL  8/2015  
 lymph node removal  
 HX OTHER SURGICAL Left 8/27/15  
 excision of left groin lymph node.  HX TUBAL LIGATION  2005 Dr. Jude Jon Family History: 
Family History Problem Relation Age of Onset  Hypertension Mother  Cancer Maternal Grandfather   
     stomach Social History: 
Social History Tobacco Use  Smoking status: Current Every Day Smoker Packs/day: 0.50 Years: 15.00 Pack years: 7.50  Smokeless tobacco: Never Used Substance Use Topics  Alcohol use: Yes Comment: occasionally  Drug use: No  
  Types: Prescription, OTC Allergies: Allergies Allergen Reactions  Latex Hives  Motrin [Ibuprofen] Hives Allergic just to the coating of brand name Motrin, ok to take generic  Tramadol Rash Review of Systems Review of Systems Constitutional: Negative for chills and fever. HENT: Negative for ear pain and sore throat. Eyes: Negative for redness and visual disturbance. Respiratory: Negative for cough and shortness of breath. Cardiovascular: Negative for chest pain and palpitations. Gastrointestinal: Negative for abdominal pain, nausea and vomiting. Genitourinary: Negative for dysuria and hematuria. Musculoskeletal: Negative for back pain and gait problem. Skin: Positive for rash. Negative for wound. Neurological: Negative for dizziness and headaches. Psychiatric/Behavioral: Negative for behavioral problems and confusion. All other systems reviewed and are negative. Physical Exam  
Physical Exam  
Constitutional: She is oriented to person, place, and time. She appears well-developed and well-nourished. No distress. HENT:  
Head: Normocephalic and atraumatic. Eyes: Pupils are equal, round, and reactive to light. Conjunctivae and EOM are normal.  
Neck: Normal range of motion. Neck supple. Cardiovascular: Normal rate, regular rhythm and normal heart sounds. Pulmonary/Chest: Effort normal and breath sounds normal.  
Musculoskeletal: Normal range of motion. Neurological: She is alert and oriented to person, place, and time. She has normal strength. No cranial nerve deficit or sensory deficit. GCS eye subscore is 4. GCS verbal subscore is 5. GCS motor subscore is 6. Skin: Skin is warm and dry. Erythematous papular rash to the bilateral arms and few scattered lesions noted to the anterior chest and upper back. There are overlying excoriations. No petechiae or purpura. Psychiatric: She has a normal mood and affect. Her behavior is normal.  
Nursing note and vitals reviewed. Diagnostic Study Results Labs - No results found for this or any previous visit (from the past 12 hour(s)). Radiologic Studies - No orders to display CT Results  (Last 48 hours) None CXR Results  (Last 48 hours) None Medical Decision Making I am the first provider for this patient. I reviewed the vital signs, available nursing notes, past medical history, past surgical history, family history and social history. Vital Signs-Reviewed the patient's vital signs. Patient Vitals for the past 12 hrs: 
 Temp Pulse Resp BP SpO2  
03/22/19 1919 98.5 °F (36.9 °C) 84 16 122/76 100 % Records Reviewed: Nursing Notes and Old Medical Records Provider Notes (Medical Decision Making):  
Patient presents with erythematous papular rash to the arms, upper chest, and upper back. It appears to be consistent with a contact dermatitis versus allergic reaction. There are no signs of anaphylaxis or angioedema. Will prescribe topical and systemic corticosteroids and antihistamines. ED Course: Initial assessment performed. The patients presenting problems have been discussed, and they are in agreement with the care plan formulated and outlined with them. I have encouraged them to ask questions as they arise throughout their visit. Critical Care Time:  
none DISCHARGE NOTE: 
7:32 PM 
Taylor Zuluaga's  results have been reviewed with her. She has been counseled regarding her diagnosis. She verbally conveys understanding and agreement of the signs, symptoms, diagnosis, treatment and prognosis and additionally agrees to follow up as recommended with Dr. Fredy Turner, Provider in 24 - 48 hours. She also agrees with the care-plan and conveys that all of her questions have been answered. I have also put together some discharge instructions for her that include: 1) educational information regarding their diagnosis, 2) how to care for their diagnosis at home, as well a 3) list of reasons why they would want to return to the ED prior to their follow-up appointment, should their condition change. She and/or family's questions have been answered. I have encouraged them to see the official results in [facility]\" or to retrieve the specifics of their results from medical records. PLAN: 
1. Return precautions as discussed 2. Follow-up with providers as directed 3. Medications as prescribed Return to ED if worse Diagnosis Clinical Impression: 1. Contact dermatitis, unspecified contact dermatitis type, unspecified trigger Discharge Medication List as of 3/22/2019  7:32 PM  
  
START taking these medications Details  
predniSONE (DELTASONE) 50 mg tablet Take 1 Tab by mouth daily for 5 days. , Normal, Disp-5 Tab, R-0  
  
cetirizine (ZYRTEC) 10 mg tablet Take 1 Tab by mouth two (2) times a day for 7 days. , Normal, Disp-14 Tab, R-0  
  
triamcinolone (KENALOG) 0.1 % lotion Apply  to affected area three (3) times daily.  use thin layer, Normal, Disp-60 mL, R-0  
  
  
 CONTINUE these medications which have NOT CHANGED Details  
ondansetron (ZOFRAN ODT) 4 mg disintegrating tablet Take 1 Tab by mouth every eight (8) hours as needed for Nausea., Normal, Disp-8 Tab, R-0  
  
dicyclomine (BENTYL) 20 mg tablet Take 1 Tab by mouth every six (6) hours as needed (abdominal cramps) for up to 20 doses. , Normal, Disp-20 Tab, R-0 Follow-up Information Follow up With Specialties Details Why Contact Info Your primary care provider  Go in 2 days for a recheck of symptoms [facility] EMERGENCY DEPT Emergency Medicine Go to If symptoms worsen Jose [address]ht [address] [facility] 
[phone]

## 2019-09-06 ENCOUNTER — HOSPITAL ENCOUNTER (EMERGENCY)
Age: 39
Discharge: HOME | End: 2019-09-06
Attending: EMERGENCY MEDICINE | Admitting: EMERGENCY MEDICINE
Payer: COMMERCIAL

## 2019-09-06 ENCOUNTER — APPOINTMENT (OUTPATIENT)
Dept: CT IMAGING | Age: 39
End: 2019-09-06
Attending: PHYSICIAN ASSISTANT
Payer: COMMERCIAL

## 2019-09-06 VITALS
HEART RATE: 74 BPM | RESPIRATION RATE: 16 BRPM | SYSTOLIC BLOOD PRESSURE: 136 MMHG | HEIGHT: 60 IN | OXYGEN SATURATION: 97 % | BODY MASS INDEX: 31.34 KG/M2 | DIASTOLIC BLOOD PRESSURE: 90 MMHG | TEMPERATURE: 98.2 F | WEIGHT: 159.61 LBS

## 2019-09-06 DIAGNOSIS — N83.201 OVARIAN CYST, RIGHT: ICD-10-CM

## 2019-09-06 DIAGNOSIS — R10.84 ABDOMINAL PAIN, GENERALIZED: Primary | ICD-10-CM

## 2019-09-06 LAB
ALBUMIN SERPL-MCNC: 3.8 G/DL (ref 3.5–5)
ALBUMIN/GLOB SERPL: 0.9 {RATIO} (ref 1.1–2.2)
ALP SERPL-CCNC: 59 U/L (ref 45–117)
ALT SERPL-CCNC: 30 U/L (ref 12–78)
ANION GAP SERPL CALC-SCNC: 4 MMOL/L (ref 5–15)
APPEARANCE UR: ABNORMAL
AST SERPL-CCNC: 24 U/L (ref 15–37)
BACTERIA URNS QL MICRO: NEGATIVE /HPF
BASOPHILS # BLD: 0 K/UL (ref 0–0.1)
BASOPHILS NFR BLD: 0 % (ref 0–1)
BILIRUB SERPL-MCNC: 0.7 MG/DL (ref 0.2–1)
BILIRUB UR QL: NEGATIVE
BUN SERPL-MCNC: 10 MG/DL (ref 6–20)
BUN/CREAT SERPL: 13 (ref 12–20)
CALCIUM SERPL-MCNC: 9.1 MG/DL (ref 8.5–10.1)
CHLORIDE SERPL-SCNC: 104 MMOL/L (ref 97–108)
CO2 SERPL-SCNC: 28 MMOL/L (ref 21–32)
COLOR UR: ABNORMAL
CREAT SERPL-MCNC: 0.77 MG/DL (ref 0.55–1.02)
DIFFERENTIAL METHOD BLD: ABNORMAL
EOSINOPHIL # BLD: 0.1 K/UL (ref 0–0.4)
EOSINOPHIL NFR BLD: 3 % (ref 0–7)
EPITH CASTS URNS QL MICRO: ABNORMAL /LPF
ERYTHROCYTE [DISTWIDTH] IN BLOOD BY AUTOMATED COUNT: 12.6 % (ref 11.5–14.5)
GLOBULIN SER CALC-MCNC: 4.3 G/DL (ref 2–4)
GLUCOSE SERPL-MCNC: 85 MG/DL (ref 65–100)
GLUCOSE UR STRIP.AUTO-MCNC: NEGATIVE MG/DL
HCG UR QL: NEGATIVE
HCT VFR BLD AUTO: 35.9 % (ref 35–47)
HGB BLD-MCNC: 12 G/DL (ref 11.5–16)
HGB UR QL STRIP: NEGATIVE
IMM GRANULOCYTES # BLD AUTO: 0 K/UL (ref 0–0.04)
IMM GRANULOCYTES NFR BLD AUTO: 1 % (ref 0–0.5)
KETONES UR QL STRIP.AUTO: NEGATIVE MG/DL
LEUKOCYTE ESTERASE UR QL STRIP.AUTO: NEGATIVE
LYMPHOCYTES # BLD: 0.5 K/UL (ref 0.8–3.5)
LYMPHOCYTES NFR BLD: 11 % (ref 12–49)
MCH RBC QN AUTO: 29.7 PG (ref 26–34)
MCHC RBC AUTO-ENTMCNC: 33.4 G/DL (ref 30–36.5)
MCV RBC AUTO: 88.9 FL (ref 80–99)
MONOCYTES # BLD: 0.4 K/UL (ref 0–1)
MONOCYTES NFR BLD: 9 % (ref 5–13)
MUCOUS THREADS URNS QL MICRO: ABNORMAL /LPF
NEUTS SEG # BLD: 3.4 K/UL (ref 1.8–8)
NEUTS SEG NFR BLD: 76 % (ref 32–75)
NITRITE UR QL STRIP.AUTO: NEGATIVE
NRBC # BLD: 0 K/UL (ref 0–0.01)
NRBC BLD-RTO: 0 PER 100 WBC
PH UR STRIP: 5.5 [PH] (ref 5–8)
PLATELET # BLD AUTO: 216 K/UL (ref 150–400)
PMV BLD AUTO: 9 FL (ref 8.9–12.9)
POTASSIUM SERPL-SCNC: 3.8 MMOL/L (ref 3.5–5.1)
PROT SERPL-MCNC: 8.1 G/DL (ref 6.4–8.2)
PROT UR STRIP-MCNC: NEGATIVE MG/DL
RBC # BLD AUTO: 4.04 M/UL (ref 3.8–5.2)
RBC #/AREA URNS HPF: ABNORMAL /HPF (ref 0–5)
RBC MORPH BLD: ABNORMAL
SODIUM SERPL-SCNC: 136 MMOL/L (ref 136–145)
SP GR UR REFRACTOMETRY: 1.02 (ref 1–1.03)
UA: UC IF INDICATED,UAUC: ABNORMAL
UROBILINOGEN UR QL STRIP.AUTO: 1 EU/DL (ref 0.2–1)
WBC # BLD AUTO: 4.4 K/UL (ref 3.6–11)
WBC URNS QL MICRO: ABNORMAL /HPF (ref 0–4)

## 2019-09-06 PROCEDURE — 81025 URINE PREGNANCY TEST: CPT

## 2019-09-06 PROCEDURE — 74011636320 HC RX REV CODE- 636/320: Performed by: EMERGENCY MEDICINE

## 2019-09-06 PROCEDURE — 74011250636 HC RX REV CODE- 250/636: Performed by: PHYSICIAN ASSISTANT

## 2019-09-06 PROCEDURE — 99283 EMERGENCY DEPT VISIT LOW MDM: CPT

## 2019-09-06 PROCEDURE — 81001 URINALYSIS AUTO W/SCOPE: CPT

## 2019-09-06 PROCEDURE — 36415 COLL VENOUS BLD VENIPUNCTURE: CPT

## 2019-09-06 PROCEDURE — 96375 TX/PRO/DX INJ NEW DRUG ADDON: CPT

## 2019-09-06 PROCEDURE — 96374 THER/PROPH/DIAG INJ IV PUSH: CPT

## 2019-09-06 PROCEDURE — 74011636320 HC RX REV CODE- 636/320: Performed by: PHYSICIAN ASSISTANT

## 2019-09-06 PROCEDURE — 74177 CT ABD & PELVIS W/CONTRAST: CPT

## 2019-09-06 PROCEDURE — 80053 COMPREHEN METABOLIC PANEL: CPT

## 2019-09-06 PROCEDURE — 85025 COMPLETE CBC W/AUTO DIFF WBC: CPT

## 2019-09-06 RX ORDER — PREDNISONE 10 MG/1
TABLET ORAL
Qty: 21 TAB | Refills: 0 | Status: SHIPPED | OUTPATIENT
Start: 2019-09-06 | End: 2019-09-12

## 2019-09-06 RX ORDER — HYDROCODONE BITARTRATE AND ACETAMINOPHEN 5; 325 MG/1; MG/1
1 TABLET ORAL
Qty: 10 TAB | Refills: 0 | Status: SHIPPED | OUTPATIENT
Start: 2019-09-06 | End: 2019-09-09

## 2019-09-06 RX ORDER — MORPHINE SULFATE 2 MG/ML
2 INJECTION, SOLUTION INTRAMUSCULAR; INTRAVENOUS
Status: COMPLETED | OUTPATIENT
Start: 2019-09-06 | End: 2019-09-06

## 2019-09-06 RX ORDER — ONDANSETRON 2 MG/ML
4 INJECTION INTRAMUSCULAR; INTRAVENOUS
Status: COMPLETED | OUTPATIENT
Start: 2019-09-06 | End: 2019-09-06

## 2019-09-06 RX ORDER — SODIUM CHLORIDE 0.9 % (FLUSH) 0.9 %
10 SYRINGE (ML) INJECTION
Status: COMPLETED | OUTPATIENT
Start: 2019-09-06 | End: 2019-09-06

## 2019-09-06 RX ORDER — ONDANSETRON 4 MG/1
4 TABLET, ORALLY DISINTEGRATING ORAL
Qty: 10 TAB | Refills: 0 | Status: SHIPPED | OUTPATIENT
Start: 2019-09-06 | End: 2020-04-13

## 2019-09-06 RX ADMIN — SODIUM CHLORIDE 1000 ML: 900 INJECTION, SOLUTION INTRAVENOUS at 15:07

## 2019-09-06 RX ADMIN — IOHEXOL 50 ML: 240 INJECTION, SOLUTION INTRATHECAL; INTRAVASCULAR; INTRAVENOUS; ORAL at 14:53

## 2019-09-06 RX ADMIN — MORPHINE SULFATE 2 MG: 2 INJECTION, SOLUTION INTRAMUSCULAR; INTRAVENOUS at 14:54

## 2019-09-06 RX ADMIN — IOPAMIDOL 100 ML: 755 INJECTION, SOLUTION INTRAVENOUS at 17:09

## 2019-09-06 RX ADMIN — Medication 10 ML: at 17:10

## 2019-09-06 RX ADMIN — ONDANSETRON 4 MG: 2 INJECTION INTRAMUSCULAR; INTRAVENOUS at 14:53

## 2019-09-06 NOTE — DISCHARGE INSTRUCTIONS
Patient Education        Abdominal Pain: Care Instructions  Your Care Instructions    Abdominal pain has many possible causes. Some aren't serious and get better on their own in a few days. Others need more testing and treatment. If your pain continues or gets worse, you need to be rechecked and may need more tests to find out what is wrong. You may need surgery to correct the problem. Don't ignore new symptoms, such as fever, nausea and vomiting, urination problems, pain that gets worse, and dizziness. These may be signs of a more serious problem. Your doctor may have recommended a follow-up visit in the next 8 to 12 hours. If you are not getting better, you may need more tests or treatment. The doctor has checked you carefully, but problems can develop later. If you notice any problems or new symptoms, get medical treatment right away. Follow-up care is a key part of your treatment and safety. Be sure to make and go to all appointments, and call your doctor if you are having problems. It's also a good idea to know your test results and keep a list of the medicines you take. How can you care for yourself at home? · Rest until you feel better. · To prevent dehydration, drink plenty of fluids, enough so that your urine is light yellow or clear like water. Choose water and other caffeine-free clear liquids until you feel better. If you have kidney, heart, or liver disease and have to limit fluids, talk with your doctor before you increase the amount of fluids you drink. · If your stomach is upset, eat mild foods, such as rice, dry toast or crackers, bananas, and applesauce. Try eating several small meals instead of two or three large ones. · Wait until 48 hours after all symptoms have gone away before you have spicy foods, alcohol, and drinks that contain caffeine. · Do not eat foods that are high in fat. · Avoid anti-inflammatory medicines such as aspirin, ibuprofen (Advil, Motrin), and naproxen (Aleve). These can cause stomach upset. Talk to your doctor if you take daily aspirin for another health problem. When should you call for help? Call 911 anytime you think you may need emergency care. For example, call if:    · You passed out (lost consciousness).     · You pass maroon or very bloody stools.     · You vomit blood or what looks like coffee grounds.     · You have new, severe belly pain.    Call your doctor now or seek immediate medical care if:    · Your pain gets worse, especially if it becomes focused in one area of your belly.     · You have a new or higher fever.     · Your stools are black and look like tar, or they have streaks of blood.     · You have unexpected vaginal bleeding.     · You have symptoms of a urinary tract infection. These may include:  ? Pain when you urinate. ? Urinating more often than usual.  ? Blood in your urine.     · You are dizzy or lightheaded, or you feel like you may faint.    Watch closely for changes in your health, and be sure to contact your doctor if:    · You are not getting better after 1 day (24 hours). Where can you learn more? Go to http://sandrineIagnosisshaista.info/. Enter M602 in the search box to learn more about \"Abdominal Pain: Care Instructions. \"  Current as of: September 23, 2018  Content Version: 12.1  © 9568-3341 [employer]. Care instructions adapted under license by [employer] (which disclaims liability or warranty for this information). If you have questions about a medical condition or this instruction, always ask your healthcare professional. [place] [address] any warranty or liability for your use of this information. Patient Education        Functional Ovarian Cyst: Care Instructions  Your Care Instructions    A functional ovarian cyst is a sac that forms on the surface of a woman's ovary during ovulation. The sac holds a maturing egg.  Usually the sac goes away after the egg is released. But if the egg is not released, or if the sac closes up after the egg is released, the sac can swell up with fluid and form a cyst.  Functional ovarian cysts are different than ovarian growths caused by other problems, such as cancer. Most functional ovarian cysts cause no symptoms and go away on their own. Some cause mild pain. Others can cause severe pain when they rupture or bleed. Follow-up care is a key part of your treatment and safety. Be sure to make and go to all appointments, and call your doctor if you are having problems. It's also a good idea to know your test results and keep a list of the medicines you take. How can you care for yourself at home? · Use heat, such as a hot water bottle, a heating pad set on low, or a warm bath, to relax tense muscles and relieve cramping. · Be safe with medicines. Take pain medicines exactly as directed. ? If the doctor gave you a prescription medicine for pain, take it as prescribed. ? If you are not taking a prescription pain medicine, ask your doctor if you can take an over-the-counter medicine. · Avoid constipation. Make sure you drink enough fluids and include fruits, vegetables, and fiber in your diet each day. Constipation does not cause ovarian cysts, but it may make your pelvic pain worse. When should you call for help? Call your doctor now or seek immediate medical care if:    · You have severe vaginal bleeding.     · You have new or worse belly or pelvic pain.    Watch closely for changes in your health, and be sure to contact your doctor if:    · You have unusual vaginal bleeding.     · You do not get better as expected. Where can you learn more? Go to http://sandrine-shaista.info/. Enter S062 in the search box to learn more about \"Functional Ovarian Cyst: Care Instructions. \"  Current as of: February 19, 2019  Content Version: 12.1  © 2516-1129 [employer].  Care instructions adapted under license by [employer] (which disclaims liability or warranty for this information). If you have questions about a medical condition or this instruction, always ask your healthcare professional. [address] any warranty or liability for your use of this information.

## 2019-09-06 NOTE — ED PROVIDER NOTES
EMERGENCY DEPARTMENT HISTORY AND PHYSICAL EXAM      Date: 9/6/2019  Patient Name: Shari Proctor    History of Presenting Illness     Chief Complaint   Patient presents with    Groin Pain     R groin pain x 2 days    Abdominal Pain     intermittent L upper abdominal pain x 1 day       History Provided By: Patient    HPI: Shari Proctor, 41 y.o. female presents ambulatory to the ED with cc of 3 days of 10 out of 10 essentially constant, aching diffuse abdominal pain that is worse with movement and palpation. There is an associated nausea and a single episode of vomiting once yesterday. There is no diarrhea. She endorses constipation. She has a history of sarcoid and is not presently taking steroids. There has been no fever. There is no chest pain or shortness of breath. There are no other complaints, changes, or physical findings at this time. PCP: Unknown, Provider    Current Outpatient Medications   Medication Sig Dispense Refill    predniSONE (STERAPRED DS) 10 mg dose pack Standard 6 day taper 21 Tab 0    ondansetron (ZOFRAN ODT) 4 mg disintegrating tablet Take 1 Tab by mouth every eight (8) hours as needed for Nausea. 10 Tab 0    HYDROcodone-acetaminophen (NORCO) 5-325 mg per tablet Take 1 Tab by mouth every six (6) hours as needed for Pain for up to 3 days. Max Daily Amount: 4 Tabs.  10 Tab 0     Past History     Past Medical History:  Past Medical History:   Diagnosis Date    Anemia     Headache     Hypertension     Obesity     PUD (peptic ulcer disease) 2002    resolved    Sarcoidosis of lymph nodes     Stroke [facility])     patient states TIA       Past Surgical History:  Past Surgical History:   Procedure Laterality Date    HX GYN      Left ovary and fallopian tube removed, D&C, tubal ligation    HX OTHER SURGICAL  2014    Endometrial Ablation, Dr. Nilesh Suarez HX OTHER SURGICAL  8/2015    lymph node removal    HX OTHER SURGICAL Left 8/27/15    excision of left groin lymph node.    HX TUBAL LIGATION  2005    Dr. Madison Jon       Family History:  Family History   Problem Relation Age of Onset    Hypertension Mother     Cancer Maternal Grandfather         stomach       Social History:  Social History     Tobacco Use    Smoking status: Current Every Day Smoker     Packs/day: 0.25     Years: 15.00     Pack years: 3.75    Smokeless tobacco: Never Used   Substance Use Topics    Alcohol use: Yes     Comment: occasionally    Drug use: No     Types: Prescription, OTC       Allergies: Allergies   Allergen Reactions    Latex Hives    Motrin [Ibuprofen] Hives     Allergic just to the coating of brand name Motrin, ok to take generic    Tramadol Rash     Review of Systems   Review of Systems   Constitutional: Negative for fatigue and fever. HENT: Negative for ear pain and sore throat. Eyes: Negative for pain, redness and visual disturbance. Respiratory: Negative for cough and shortness of breath. Cardiovascular: Negative for chest pain and palpitations. Gastrointestinal: Positive for abdominal pain, constipation, nausea and vomiting. Negative for diarrhea. Genitourinary: Negative for dysuria, frequency and urgency. Musculoskeletal: Negative for back pain, gait problem, neck pain and neck stiffness. Skin: Negative for rash and wound. Neurological: Negative for dizziness, weakness, light-headedness, numbness and headaches. Physical Exam   Physical Exam   Constitutional: She is oriented to person, place, and time. She appears well-developed and well-nourished. Non-toxic appearance. No distress. HENT:   Head: Normocephalic and atraumatic. Right Ear: External ear normal.   Left Ear: External ear normal.   Nose: Nose normal.   Mouth/Throat: Uvula is midline. No trismus in the jaw. Eyes: Pupils are equal, round, and reactive to light. Conjunctivae and EOM are normal. No scleral icterus.    Neck: Normal range of motion and full passive range of motion without pain.   Cardiovascular: Normal rate and regular rhythm. Pulmonary/Chest: Effort normal. No accessory muscle usage. No tachypnea. No respiratory distress. She has no decreased breath sounds. She has no wheezes. Abdominal: Soft. There is tenderness. Soft  Not obviously distended  Deep palpation elicits no grimace or guarding  Diffuse tenderness   Musculoskeletal: Normal range of motion. Neurological: She is alert and oriented to person, place, and time. She is not disoriented. No cranial nerve deficit. GCS eye subscore is 4. GCS verbal subscore is 5. GCS motor subscore is 6. Skin: Skin is intact. No rash noted. Psychiatric: She has a normal mood and affect. Her speech is normal.   Nursing note and vitals reviewed. Diagnostic Study Results     Labs -     Recent Results (from the past 12 hour(s))   HCG URINE, QL. - POC    Collection Time: 09/06/19  1:46 PM   Result Value Ref Range    Pregnancy test,urine (POC) NEGATIVE  NEG     CBC WITH AUTOMATED DIFF    Collection Time: 09/06/19  1:47 PM   Result Value Ref Range    WBC 4.4 3.6 - 11.0 K/uL    RBC 4.04 3.80 - 5.20 M/uL    HGB 12.0 11.5 - 16.0 g/dL    HCT 35.9 35.0 - 47.0 %    MCV 88.9 80.0 - 99.0 FL    MCH 29.7 26.0 - 34.0 PG    MCHC 33.4 30.0 - 36.5 g/dL    RDW 12.6 11.5 - 14.5 %    PLATELET 783 181 - 472 K/uL    MPV 9.0 8.9 - 12.9 FL    NRBC 0.0 0  WBC    ABSOLUTE NRBC 0.00 0.00 - 0.01 K/uL    NEUTROPHILS 76 (H) 32 - 75 %    LYMPHOCYTES 11 (L) 12 - 49 %    MONOCYTES 9 5 - 13 %    EOSINOPHILS 3 0 - 7 %    BASOPHILS 0 0 - 1 %    IMMATURE GRANULOCYTES 1 (H) 0.0 - 0.5 %    ABS. NEUTROPHILS 3.4 1.8 - 8.0 K/UL    ABS. LYMPHOCYTES 0.5 (L) 0.8 - 3.5 K/UL    ABS. MONOCYTES 0.4 0.0 - 1.0 K/UL    ABS. EOSINOPHILS 0.1 0.0 - 0.4 K/UL    ABS. BASOPHILS 0.0 0.0 - 0.1 K/UL    ABS. IMM.  GRANS. 0.0 0.00 - 0.04 K/UL    DF AUTOMATED      RBC COMMENTS NORMOCYTIC, NORMOCHROMIC     METABOLIC PANEL, COMPREHENSIVE    Collection Time: 09/06/19  1:47 PM   Result Value Ref Range    Sodium 136 136 - 145 mmol/L    Potassium 3.8 3.5 - 5.1 mmol/L    Chloride 104 97 - 108 mmol/L    CO2 28 21 - 32 mmol/L    Anion gap 4 (L) 5 - 15 mmol/L    Glucose 85 65 - 100 mg/dL    BUN 10 6 - 20 MG/DL    Creatinine 0.77 0.55 - 1.02 MG/DL    BUN/Creatinine ratio 13 12 - 20      GFR est AA >60 >60 ml/min/1.73m2    GFR est non-AA >60 >60 ml/min/1.73m2    Calcium 9.1 8.5 - 10.1 MG/DL    Bilirubin, total 0.7 0.2 - 1.0 MG/DL    ALT (SGPT) 30 12 - 78 U/L    AST (SGOT) 24 15 - 37 U/L    Alk. phosphatase 59 45 - 117 U/L    Protein, total 8.1 6.4 - 8.2 g/dL    Albumin 3.8 3.5 - 5.0 g/dL    Globulin 4.3 (H) 2.0 - 4.0 g/dL    A-G Ratio 0.9 (L) 1.1 - 2.2     URINALYSIS W/ REFLEX CULTURE    Collection Time: 09/06/19  1:49 PM   Result Value Ref Range    Color YELLOW/STRAW      Appearance CLOUDY (A) CLEAR      Specific gravity 1.022 1.003 - 1.030      pH (UA) 5.5 5.0 - 8.0      Protein NEGATIVE  NEG mg/dL    Glucose NEGATIVE  NEG mg/dL    Ketone NEGATIVE  NEG mg/dL    Bilirubin NEGATIVE  NEG      Blood NEGATIVE  NEG      Urobilinogen 1.0 0.2 - 1.0 EU/dL    Nitrites NEGATIVE  NEG      Leukocyte Esterase NEGATIVE  NEG      WBC 0-4 0 - 4 /hpf    RBC 0-5 0 - 5 /hpf    Epithelial cells MODERATE (A) FEW /lpf    Bacteria NEGATIVE  NEG /hpf    UA:UC IF INDICATED CULTURE NOT INDICATED BY UA RESULT CNI      Mucus 2+ (A) NEG /lpf       Radiologic Studies -   CT ABD PELV W CONT   Final Result   IMPRESSION:   Minimal cystic changes right ovary        CT Results  (Last 48 hours)               09/06/19 1709  CT ABD PELV W CONT Final result    Impression:  IMPRESSION:   Minimal cystic changes right ovary       Narrative:  EXAM: CT ABD PELV W CONT       INDICATION: Abdominal pain right lower quadrant, left upper quadrant, groin,   history of sarcoidosis. COMPARISON: November 26, 2018        CONTRAST: 100 mL of Isovue-370.        TECHNIQUE:    Following the uneventful intravenous administration of contrast, thin axial   images were obtained through the abdomen and pelvis. Coronal and sagittal   reconstructions were generated. Oral contrast was  administered. CT dose   reduction was achieved through use of a standardized protocol tailored for this   examination and automatic exposure control for dose modulation. FINDINGS:    LUNG BASES: No change   INCIDENTALLY IMAGED HEART AND MEDIASTINUM: Unremarkable. LIVER: No mass or biliary dilatation. GALLBLADDER: Unremarkable. SPLEEN: No mass. PANCREAS: No mass or ductal dilatation. ADRENALS: Unremarkable. KIDNEYS: No mass, calculus, or hydronephrosis. STOMACH: Unremarkable. SMALL BOWEL: No dilatation or wall thickening. COLON: No dilatation or wall thickening. APPENDIX: Unremarkable. PERITONEUM: No ascites or pneumoperitoneum. RETROPERITONEUM: No lymphadenopathy or aortic aneurysm. REPRODUCTIVE ORGANS: Minimal cystic changes right ovary   URINARY BLADDER: Not filled cannot be evaluated. BONES: No destructive bone lesion. ADDITIONAL COMMENTS: N/A               Medical Decision Making   I am the first provider for this patient. I reviewed the vital signs, available nursing notes, past medical history, past surgical history, family history and social history. Vital Signs-Reviewed the patient's vital signs. Patient Vitals for the past 12 hrs:   Temp Pulse Resp BP SpO2   09/06/19 1331 98.2 °F (36.8 °C) 74 16 136/90 97 %       Pulse Oximetry Analysis - 97% on RA    Records Reviewed: Nursing Notes, Old Medical Records, Previous Radiology Studies and Previous Laboratory Studies    Provider Notes (Medical Decision Making):   DDx includes hepatitis, pancreatitis, cholecystitis, appendicitis, diverticulitis, obstruction, UTI, pyelonephritis, gastroenteritis, gastritis. Though some of these considerations can be excluded by history and physical exam, others may require additional testing such as laboratory and imaging. ED Course:   Initial assessment performed. The patients presenting problems have been discussed, and they are in agreement with the care plan formulated and outlined with them. I have encouraged them to ask questions as they arise throughout their visit. Disposition:  Discharge    PLAN:  1. Current Discharge Medication List      START taking these medications    Details   predniSONE (STERAPRED DS) 10 mg dose pack Standard 6 day taper  Qty: 21 Tab, Refills: 0      ondansetron (ZOFRAN ODT) 4 mg disintegrating tablet Take 1 Tab by mouth every eight (8) hours as needed for Nausea. Qty: 10 Tab, Refills: 0      HYDROcodone-acetaminophen (NORCO) 5-325 mg per tablet Take 1 Tab by mouth every six (6) hours as needed for Pain for up to 3 days. Max Daily Amount: 4 Tabs. Qty: 10 Tab, Refills: 0    Associated Diagnoses: Ovarian cyst, right; Abdominal pain, generalized           2. Follow-up Information     Follow up With Specialties Details Why Contact Info    Your GYN  In 3 days As needed         Return to ED if worse     Diagnosis     Clinical Impression:   1. Abdominal pain, generalized    2.  Ovarian cyst, right

## 2019-09-06 NOTE — ED NOTES
Assumed care of pt from triage. Pt is A&O x 4. Pt reports CC of  Lower right sided abd pain x 3 days. Pt reports she was nauseous last night and vomited once. Pt resting on stretcher in POC.

## 2019-10-05 ENCOUNTER — HOSPITAL ENCOUNTER (EMERGENCY)
Age: 39
Discharge: HOME | End: 2019-10-05
Attending: EMERGENCY MEDICINE
Payer: COMMERCIAL

## 2019-10-05 VITALS
DIASTOLIC BLOOD PRESSURE: 94 MMHG | BODY MASS INDEX: 31.41 KG/M2 | OXYGEN SATURATION: 98 % | WEIGHT: 160 LBS | SYSTOLIC BLOOD PRESSURE: 140 MMHG | HEART RATE: 70 BPM | RESPIRATION RATE: 20 BRPM | TEMPERATURE: 97.5 F | HEIGHT: 60 IN

## 2019-10-05 DIAGNOSIS — M54.31 RIGHT SIDED SCIATICA: Primary | ICD-10-CM

## 2019-10-05 LAB
ALBUMIN SERPL-MCNC: 3.3 G/DL (ref 3.5–5)
ALBUMIN/GLOB SERPL: 0.8 {RATIO} (ref 1.1–2.2)
ALP SERPL-CCNC: 63 U/L (ref 45–117)
ALT SERPL-CCNC: 40 U/L (ref 12–78)
ANION GAP SERPL CALC-SCNC: 8 MMOL/L (ref 5–15)
APPEARANCE UR: ABNORMAL
AST SERPL-CCNC: 35 U/L (ref 15–37)
BACTERIA URNS QL MICRO: ABNORMAL /HPF
BASOPHILS # BLD: 0 K/UL (ref 0–0.1)
BASOPHILS NFR BLD: 0 % (ref 0–1)
BILIRUB SERPL-MCNC: 0.4 MG/DL (ref 0.2–1)
BILIRUB UR QL: NEGATIVE
BUN SERPL-MCNC: 13 MG/DL (ref 6–20)
BUN/CREAT SERPL: 14 (ref 12–20)
CALCIUM SERPL-MCNC: 8.5 MG/DL (ref 8.5–10.1)
CHLORIDE SERPL-SCNC: 103 MMOL/L (ref 97–108)
CO2 SERPL-SCNC: 25 MMOL/L (ref 21–32)
COLOR UR: ABNORMAL
CREAT SERPL-MCNC: 0.9 MG/DL (ref 0.55–1.02)
DIFFERENTIAL METHOD BLD: ABNORMAL
EOSINOPHIL # BLD: 0.1 K/UL (ref 0–0.4)
EOSINOPHIL NFR BLD: 4 % (ref 0–7)
EPITH CASTS URNS QL MICRO: ABNORMAL /LPF
ERYTHROCYTE [DISTWIDTH] IN BLOOD BY AUTOMATED COUNT: 12.3 % (ref 11.5–14.5)
GLOBULIN SER CALC-MCNC: 4 G/DL (ref 2–4)
GLUCOSE SERPL-MCNC: 91 MG/DL (ref 65–100)
GLUCOSE UR STRIP.AUTO-MCNC: NEGATIVE MG/DL
HCG UR QL: NEGATIVE
HCT VFR BLD AUTO: 32.1 % (ref 35–47)
HGB BLD-MCNC: 10.8 G/DL (ref 11.5–16)
HGB UR QL STRIP: NEGATIVE
IMM GRANULOCYTES # BLD AUTO: 0.1 K/UL (ref 0–0.04)
IMM GRANULOCYTES NFR BLD AUTO: 2 % (ref 0–0.5)
KETONES UR QL STRIP.AUTO: NEGATIVE MG/DL
LEUKOCYTE ESTERASE UR QL STRIP.AUTO: NEGATIVE
LYMPHOCYTES # BLD: 0.7 K/UL (ref 0.8–3.5)
LYMPHOCYTES NFR BLD: 20 % (ref 12–49)
MCH RBC QN AUTO: 30.1 PG (ref 26–34)
MCHC RBC AUTO-ENTMCNC: 33.6 G/DL (ref 30–36.5)
MCV RBC AUTO: 89.4 FL (ref 80–99)
MONOCYTES # BLD: 0.4 K/UL (ref 0–1)
MONOCYTES NFR BLD: 13 % (ref 5–13)
NEUTS SEG # BLD: 2 K/UL (ref 1.8–8)
NEUTS SEG NFR BLD: 61 % (ref 32–75)
NITRITE UR QL STRIP.AUTO: NEGATIVE
NRBC # BLD: 0 K/UL (ref 0–0.01)
NRBC BLD-RTO: 0 PER 100 WBC
PH UR STRIP: 5 [PH] (ref 5–8)
PLATELET # BLD AUTO: 187 K/UL (ref 150–400)
PMV BLD AUTO: 9.4 FL (ref 8.9–12.9)
POTASSIUM SERPL-SCNC: 3.9 MMOL/L (ref 3.5–5.1)
PROT SERPL-MCNC: 7.3 G/DL (ref 6.4–8.2)
PROT UR STRIP-MCNC: NEGATIVE MG/DL
RBC # BLD AUTO: 3.59 M/UL (ref 3.8–5.2)
RBC #/AREA URNS HPF: ABNORMAL /HPF (ref 0–5)
RBC MORPH BLD: ABNORMAL
SODIUM SERPL-SCNC: 136 MMOL/L (ref 136–145)
SP GR UR REFRACTOMETRY: 1.03 (ref 1–1.03)
UA: UC IF INDICATED,UAUC: ABNORMAL
UROBILINOGEN UR QL STRIP.AUTO: 1 EU/DL (ref 0.2–1)
WBC # BLD AUTO: 3.3 K/UL (ref 3.6–11)
WBC URNS QL MICRO: ABNORMAL /HPF (ref 0–4)

## 2019-10-05 PROCEDURE — 96374 THER/PROPH/DIAG INJ IV PUSH: CPT

## 2019-10-05 PROCEDURE — 36415 COLL VENOUS BLD VENIPUNCTURE: CPT

## 2019-10-05 PROCEDURE — 81001 URINALYSIS AUTO W/SCOPE: CPT

## 2019-10-05 PROCEDURE — 74011000250 HC RX REV CODE- 250: Performed by: EMERGENCY MEDICINE

## 2019-10-05 PROCEDURE — 81025 URINE PREGNANCY TEST: CPT

## 2019-10-05 PROCEDURE — 99283 EMERGENCY DEPT VISIT LOW MDM: CPT

## 2019-10-05 PROCEDURE — 85025 COMPLETE CBC W/AUTO DIFF WBC: CPT

## 2019-10-05 PROCEDURE — 80053 COMPREHEN METABOLIC PANEL: CPT

## 2019-10-05 PROCEDURE — 74011250636 HC RX REV CODE- 250/636: Performed by: EMERGENCY MEDICINE

## 2019-10-05 PROCEDURE — 87086 URINE CULTURE/COLONY COUNT: CPT

## 2019-10-05 RX ORDER — LIDOCAINE 4 G/100G
1 PATCH TOPICAL ONCE
Status: DISCONTINUED | OUTPATIENT
Start: 2019-10-05 | End: 2019-10-05 | Stop reason: HOSPADM

## 2019-10-05 RX ORDER — KETOROLAC TROMETHAMINE 30 MG/ML
15 INJECTION, SOLUTION INTRAMUSCULAR; INTRAVENOUS
Status: COMPLETED | OUTPATIENT
Start: 2019-10-05 | End: 2019-10-05

## 2019-10-05 RX ADMIN — KETOROLAC TROMETHAMINE 15 MG: 30 INJECTION, SOLUTION INTRAMUSCULAR; INTRAVENOUS at 06:31

## 2019-10-05 NOTE — LETTER
[facility] EMERGENCY DEPT 
[address] [address] 
[phone] Work/School Note Date: 10/5/2019 To Whom It May concern: 
 
Carolyne Diaz was seen and treated today in the emergency room by the following provider(s): 
Attending Provider: Kristina Duval, [address] [place] [address] Road may return to work on 10/6/19.  
 
Sincerely, 
 
 
 
 
Kylah Leon RN

## 2019-10-05 NOTE — ED NOTES
Emergency Department Nursing Plan of Care       The Nursing Plan of Care is developed from the Nursing assessment and Emergency Department Attending provider initial evaluation. The plan of care may be reviewed in the ED Provider note.     The Plan of Care was developed with the following considerations:   Patient / Family readiness to learn indicated by:verbalized understanding  Persons(s) to be included in education: patient and family  Barriers to Learning/Limitations:Diana Lewis RN    10/5/2019   6:11 AM

## 2019-10-05 NOTE — ED PROVIDER NOTES
EMERGENCY DEPARTMENT HISTORY AND PHYSICAL EXAM      Date: 10/5/2019  Patient Name: Anuradha Packer    History of Presenting Illness     Chief Complaint   Patient presents with    Abdominal Pain       History Provided By: Patient    HPI: Anuradha Packer, 41 y.o. female with PMHx significant for sarcoidosis, peptic ulcer disease, chronic right-sided pain, who presents with right-sided pain. Patient states that she has had ongoing issues with her right lower back, buttock, and lower abdomen for the last 5 years. She describes it as a squeezing that wraps around to her leg and back. Symptoms were worse tonight. She has been worked up multiple times and no one is able to explain what the source of her symptoms. She denies any bowel or bladder incontinence or saddle anesthesia. no one-sided numbness or weakness. No cp, sob, n/v/d. Sx seem to improved when she gets steroids for her sarcoidosis. PCP: Unknown, Provider    There are no other complaints, changes, or physical findings at this time. Current Facility-Administered Medications   Medication Dose Route Frequency Provider Last Rate Last Dose    lidocaine 4 % patch 1 Patch  1 Patch TransDERmal ONCE Simba Taveras MD   1 Patch at 10/05/19 0631     Current Outpatient Medications   Medication Sig Dispense Refill    ondansetron (ZOFRAN ODT) 4 mg disintegrating tablet Take 1 Tab by mouth every eight (8) hours as needed for Nausea.  10 Tab 0     Past History     Past Medical History:  Past Medical History:   Diagnosis Date    Anemia     Headache     Hypertension     Obesity     PUD (peptic ulcer disease) 2002    resolved    Sarcoidosis of lymph nodes     Stroke [facility])     patient states TIA     Past Surgical History:  Past Surgical History:   Procedure Laterality Date    HX GYN      Left ovary and fallopian tube removed, D&C, tubal ligation    HX OTHER SURGICAL  2014    Endometrial Ablation, Dr. Jessica Rangel HX OTHER SURGICAL  8/2015 lymph node removal    HX OTHER SURGICAL Left 8/27/15    excision of left groin lymph node.  HX TUBAL LIGATION  2005    Dr. Inez Jon     Family History:  Family History   Problem Relation Age of Onset    Hypertension Mother     Cancer Maternal Grandfather         stomach     Social History:  Social History     Tobacco Use    Smoking status: Current Every Day Smoker     Packs/day: 0.25     Years: 15.00     Pack years: 3.75    Smokeless tobacco: Never Used   Substance Use Topics    Alcohol use: Yes     Comment: occasionally    Drug use: No     Types: Prescription, OTC     Allergies: Allergies   Allergen Reactions    Latex Hives    Motrin [Ibuprofen] Hives     Allergic just to the coating of brand name Motrin, ok to take generic    Tramadol Rash     Review of Systems   Review of Systems   Constitutional: Negative for chills and fever. HENT: Negative for congestion, rhinorrhea and sore throat. Respiratory: Negative for cough and shortness of breath. Cardiovascular: Negative for chest pain. Gastrointestinal: Positive for abdominal pain. Negative for nausea and vomiting. Genitourinary: Negative for dysuria and urgency. Skin: Negative for rash. Neurological: Negative for dizziness, light-headedness and headaches. All other systems reviewed and are negative. Physical Exam   Physical Exam   Constitutional: She is oriented to person, place, and time. She appears well-developed and well-nourished. No distress. HENT:   Head: Normocephalic and atraumatic. Eyes: Pupils are equal, round, and reactive to light. Conjunctivae and EOM are normal.   Neck: Normal range of motion. Cardiovascular: Normal rate, regular rhythm and intact distal pulses. Pulmonary/Chest: Effort normal and breath sounds normal. No stridor. No respiratory distress. Abdominal: Soft. Bowel sounds are normal. She exhibits no distension. There is no tenderness. There is no rebound and no guarding. Musculoskeletal: Normal range of motion. ttp over the right SI joint with +SLR  Normal strength and sensation in b/l LE   Neurological: She is alert and oriented to person, place, and time. Skin: Skin is warm and dry. Psychiatric: She has a normal mood and affect. Nursing note and vitals reviewed. Diagnostic Study Results   Labs -     Recent Results (from the past 12 hour(s))   URINALYSIS W/ REFLEX CULTURE    Collection Time: 10/05/19  5:58 AM   Result Value Ref Range    Color YELLOW/STRAW      Appearance CLOUDY (A) CLEAR      Specific gravity 1.030 1.003 - 1.030      pH (UA) 5.0 5.0 - 8.0      Protein NEGATIVE  NEG mg/dL    Glucose NEGATIVE  NEG mg/dL    Ketone NEGATIVE  NEG mg/dL    Bilirubin NEGATIVE  NEG      Blood NEGATIVE  NEG      Urobilinogen 1.0 0.2 - 1.0 EU/dL    Nitrites NEGATIVE  NEG      Leukocyte Esterase NEGATIVE  NEG      WBC 0-4 0 - 4 /hpf    RBC 0-5 0 - 5 /hpf    Epithelial cells MANY (A) FEW /lpf    Bacteria 1+ (A) NEG /hpf    UA:UC IF INDICATED URINE CULTURE ORDERED (A) CNI     HCG URINE, QL. - POC    Collection Time: 10/05/19  6:11 AM   Result Value Ref Range    Pregnancy test,urine (POC) NEGATIVE  NEG     CBC WITH AUTOMATED DIFF    Collection Time: 10/05/19  6:18 AM   Result Value Ref Range    WBC 3.3 (L) 3.6 - 11.0 K/uL    RBC 3.59 (L) 3.80 - 5.20 M/uL    HGB 10.8 (L) 11.5 - 16.0 g/dL    HCT 32.1 (L) 35.0 - 47.0 %    MCV 89.4 80.0 - 99.0 FL    MCH 30.1 26.0 - 34.0 PG    MCHC 33.6 30.0 - 36.5 g/dL    RDW 12.3 11.5 - 14.5 %    PLATELET 288 856 - 730 K/uL    MPV 9.4 8.9 - 12.9 FL    NRBC 0.0 0  WBC    ABSOLUTE NRBC 0.00 0.00 - 0.01 K/uL    NEUTROPHILS 61 32 - 75 %    LYMPHOCYTES 20 12 - 49 %    MONOCYTES 13 5 - 13 %    EOSINOPHILS 4 0 - 7 %    BASOPHILS 0 0 - 1 %    IMMATURE GRANULOCYTES 2 (H) 0.0 - 0.5 %    ABS. NEUTROPHILS 2.0 1.8 - 8.0 K/UL    ABS. LYMPHOCYTES 0.7 (L) 0.8 - 3.5 K/UL    ABS. MONOCYTES 0.4 0.0 - 1.0 K/UL    ABS. EOSINOPHILS 0.1 0.0 - 0.4 K/UL    ABS. BASOPHILS 0.0 0.0 - 0.1 K/UL    ABS. IMM. GRANS. 0.1 (H) 0.00 - 0.04 K/UL    DF SMEAR SCANNED      RBC COMMENTS NORMOCYTIC, NORMOCHROMIC     METABOLIC PANEL, COMPREHENSIVE    Collection Time: 10/05/19  6:18 AM   Result Value Ref Range    Sodium 136 136 - 145 mmol/L    Potassium 3.9 3.5 - 5.1 mmol/L    Chloride 103 97 - 108 mmol/L    CO2 25 21 - 32 mmol/L    Anion gap 8 5 - 15 mmol/L    Glucose 91 65 - 100 mg/dL    BUN 13 6 - 20 MG/DL    Creatinine 0.90 0.55 - 1.02 MG/DL    BUN/Creatinine ratio 14 12 - 20      GFR est AA >60 >60 ml/min/1.73m2    GFR est non-AA >60 >60 ml/min/1.73m2    Calcium 8.5 8.5 - 10.1 MG/DL    Bilirubin, total 0.4 0.2 - 1.0 MG/DL    ALT (SGPT) 40 12 - 78 U/L    AST (SGOT) 35 15 - 37 U/L    Alk. phosphatase 63 45 - 117 U/L    Protein, total 7.3 6.4 - 8.2 g/dL    Albumin 3.3 (L) 3.5 - 5.0 g/dL    Globulin 4.0 2.0 - 4.0 g/dL    A-G Ratio 0.8 (L) 1.1 - 2.2         Radiologic Studies -   No orders to display     No results found. Medical Decision Making   I am the first provider for this patient. I reviewed the vital signs, available nursing notes, past medical history, past surgical history, family history and social history. Vital Signs-Reviewed the patient's vital signs. Patient Vitals for the past 12 hrs:   Temp Pulse Resp BP SpO2   10/05/19 0630  70  (!) 140/94 98 %   10/05/19 0555 97.5 °F (36.4 °C) 77 20 126/78 100 %       Pulse Oximetry Analysis - 98% on RA    Records Reviewed: Nursing Notes and Old Medical Records    Provider Notes (Medical Decision Making):   Patient presents with chronic right-sided pain. On exam, she is well-appearing with stable vital signs. Her abdominal exam is benign with no focal right lower quadrant right upper quadrant tenderness to suggest surgical pathology. She is however tender over her right SI joint. She also has a positive straight leg raise on the right. Some of her symptoms may be related to sciatica.  Will check basic labs and UA, tx pain, re-eval    ED Course:   Initial assessment performed. The patients presenting problems have been discussed, and they are in agreement with the care plan formulated and outlined with them. I have encouraged them to ask questions as they arise throughout their visit.     pain improved, will d/c with pcp follow up    Critical Care:  none    Disposition:  Discharge Note:  7:16 AM  The patient has been re-evaluated and is ready for discharge. Reviewed available results with patient. Counseled patient on diagnosis and care plan. Patient has expressed understanding, and all questions have been answered. Patient agrees with plan and agrees to follow up as recommended, or to return to the ED if their symptoms worsen. Discharge instructions have been provided and explained to the patient, along with reasons to return to the ED. PLAN:  1. Current Discharge Medication List        2. Follow-up Information     Follow up With Specialties Details Why Contact Info    your PCP  Schedule an appointment as soon as possible for a visit      [facility] EMERGENCY DEPT Emergency Medicine  As needed, If symptoms worsen Devonte Ricardo  [phone]        Return to ED if worse     Diagnosis     Clinical Impression:   1. Right sided sciatica        This note will not be viewable in [email or web address]. Please note that this dictation was completed with [employer], the computer voice recognition software. Quite often unanticipated grammatical, syntax, homophones, and other interpretive errors are inadvertently transcribed by the computer software. Please disregard these errors.   Please excuse any errors that have escaped final proofreading

## 2019-10-05 NOTE — ED TRIAGE NOTES
Ambulatory patient arrives with family with complaints of R sided lower abd pain and back pain that in made worse with sitting or walking She reports being evaluated several times at [facility] in the ER with no diagnosis. She reports CT scan was done on her last visit that did not show anything. Pt reports sharp, stabbing abd pain, nausea, vomiting, intermittent bloating, greasy bowel movements, and also reports that an ova Omayra Michael cyst was seen on her CT scan.

## 2019-10-05 NOTE — DISCHARGE INSTRUCTIONS
Patient Education        Sciatica: Care Instructions  Your Care Instructions    Sciatica (say \"qkz-KY-fp-kuh\") is an irritation of one of the sciatic nerves, which come from the spinal cord in the lower back. The sciatic nerves and their branches extend down through the buttock to the foot. Sciatica can develop when an injured disc in the back presses against a spinal nerve root. Its main symptom is pain, numbness, or weakness that is often worse in the leg or foot than in the back. Sciatica often will improve and go away with time. Early treatment usually includes medicines and exercises to relieve pain. Follow-up care is a key part of your treatment and safety. Be sure to make and go to all appointments, and call your doctor if you are having problems. It's also a good idea to know your test results and keep a list of the medicines you take. How can you care for yourself at home? · Take pain medicines exactly as directed. ? If the doctor gave you a prescription medicine for pain, take it as prescribed. ? If you are not taking a prescription pain medicine, ask your doctor if you can take an over-the-counter medicine. · Use heat or ice to relieve pain. ? To apply heat, put a warm water bottle, heating pad set on low, or warm cloth on your back. Do not go to sleep with a heating pad on your skin. ? To use ice, put ice or a cold pack on the area for 10 to 20 minutes at a time. Put a thin cloth between the ice and your skin. · Avoid sitting if possible, unless it feels better than standing. · Alternate lying down with short walks. Increase your walking distance as you are able to without making your symptoms worse. · Do not do anything that makes your symptoms worse. When should you call for help? Call 911 anytime you think you may need emergency care.  For example, call if:    · You are unable to move a leg at all.   [facility] your doctor now or seek immediate medical care if:    · You have new or worse symptoms in your legs or buttocks. Symptoms may include:  ? Numbness or tingling. ? Weakness. ? Pain.     · You lose bladder or bowel control.    Watch closely for changes in your health, and be sure to contact your doctor if:    · You are not getting better as expected. Where can you learn more? Go to http://sandrine-shaista.info/. Enter [phone] in the search box to learn more about \"Sciatica: Care Instructions. \"  Current as of: June 26, 2019  Content Version: 12.2  © 7792-7207 [employer]. Care instructions adapted under license by [employer] (which disclaims liability or warranty for this information). If you have questions about a medical condition or this instruction, always ask your healthcare professional. [address] any warranty or liability for your use of this information.

## 2019-10-06 LAB
BACTERIA SPEC CULT: NORMAL
CC UR VC: NORMAL
SERVICE CMNT-IMP: NORMAL

## 2019-10-17 ENCOUNTER — HOSPITAL ENCOUNTER (OUTPATIENT)
Dept: GENERAL RADIOLOGY | Age: 39
Discharge: HOME | End: 2019-10-17
Attending: INTERNAL MEDICINE
Payer: COMMERCIAL

## 2019-10-17 DIAGNOSIS — D86.0 PULMONARY SARCOIDOSIS (HCC): ICD-10-CM

## 2019-10-17 PROCEDURE — 71046 X-RAY EXAM CHEST 2 VIEWS: CPT

## 2019-10-24 ENCOUNTER — HOSPITAL ENCOUNTER (EMERGENCY)
Age: 39
Discharge: HOME | End: 2019-10-25
Attending: EMERGENCY MEDICINE | Admitting: EMERGENCY MEDICINE
Payer: COMMERCIAL

## 2019-10-24 ENCOUNTER — APPOINTMENT (OUTPATIENT)
Dept: CT IMAGING | Age: 39
End: 2019-10-24
Attending: EMERGENCY MEDICINE
Payer: COMMERCIAL

## 2019-10-24 DIAGNOSIS — R07.89 CHEST WALL PAIN: Primary | ICD-10-CM

## 2019-10-24 DIAGNOSIS — D86.0 SARCOIDOSIS OF LUNG (HCC): ICD-10-CM

## 2019-10-24 LAB
ALBUMIN SERPL-MCNC: 3.6 G/DL (ref 3.5–5)
ALBUMIN/GLOB SERPL: 0.8 {RATIO} (ref 1.1–2.2)
ALP SERPL-CCNC: 79 U/L (ref 45–117)
ALT SERPL-CCNC: 59 U/L (ref 12–78)
ANION GAP SERPL CALC-SCNC: 10 MMOL/L (ref 5–15)
AST SERPL-CCNC: 34 U/L (ref 15–37)
BASOPHILS # BLD: 0 K/UL (ref 0–0.1)
BASOPHILS NFR BLD: 0 % (ref 0–1)
BILIRUB SERPL-MCNC: 0.2 MG/DL (ref 0.2–1)
BUN SERPL-MCNC: 5 MG/DL (ref 6–20)
BUN/CREAT SERPL: 7 (ref 12–20)
CALCIUM SERPL-MCNC: 8.8 MG/DL (ref 8.5–10.1)
CHLORIDE SERPL-SCNC: 111 MMOL/L (ref 97–108)
CK SERPL-CCNC: 75 U/L (ref 26–192)
CO2 SERPL-SCNC: 22 MMOL/L (ref 21–32)
CREAT SERPL-MCNC: 0.75 MG/DL (ref 0.55–1.02)
DIFFERENTIAL METHOD BLD: ABNORMAL
EOSINOPHIL # BLD: 0 K/UL (ref 0–0.4)
EOSINOPHIL NFR BLD: 0 % (ref 0–7)
ERYTHROCYTE [DISTWIDTH] IN BLOOD BY AUTOMATED COUNT: 13.2 % (ref 11.5–14.5)
GLOBULIN SER CALC-MCNC: 4.8 G/DL (ref 2–4)
GLUCOSE SERPL-MCNC: 126 MG/DL (ref 65–100)
HCT VFR BLD AUTO: 36 % (ref 35–47)
HGB BLD-MCNC: 12 G/DL (ref 11.5–16)
IMM GRANULOCYTES # BLD AUTO: 0 K/UL (ref 0–0.04)
IMM GRANULOCYTES NFR BLD AUTO: 1 % (ref 0–0.5)
LYMPHOCYTES # BLD: 1.1 K/UL (ref 0.8–3.5)
LYMPHOCYTES NFR BLD: 15 % (ref 12–49)
MCH RBC QN AUTO: 30.2 PG (ref 26–34)
MCHC RBC AUTO-ENTMCNC: 33.3 G/DL (ref 30–36.5)
MCV RBC AUTO: 90.5 FL (ref 80–99)
MONOCYTES # BLD: 0.6 K/UL (ref 0–1)
MONOCYTES NFR BLD: 9 % (ref 5–13)
NEUTS SEG # BLD: 5.6 K/UL (ref 1.8–8)
NEUTS SEG NFR BLD: 75 % (ref 32–75)
NRBC # BLD: 0 K/UL (ref 0–0.01)
NRBC BLD-RTO: 0 PER 100 WBC
PLATELET # BLD AUTO: 235 K/UL (ref 150–400)
PMV BLD AUTO: 9.5 FL (ref 8.9–12.9)
POTASSIUM SERPL-SCNC: 3.1 MMOL/L (ref 3.5–5.1)
PROT SERPL-MCNC: 8.4 G/DL (ref 6.4–8.2)
RBC # BLD AUTO: 3.98 M/UL (ref 3.8–5.2)
SODIUM SERPL-SCNC: 143 MMOL/L (ref 136–145)
TROPONIN I SERPL-MCNC: <0.05 NG/ML
WBC # BLD AUTO: 7.4 K/UL (ref 3.6–11)

## 2019-10-24 PROCEDURE — 71275 CT ANGIOGRAPHY CHEST: CPT

## 2019-10-24 PROCEDURE — 36415 COLL VENOUS BLD VENIPUNCTURE: CPT

## 2019-10-24 PROCEDURE — 96375 TX/PRO/DX INJ NEW DRUG ADDON: CPT

## 2019-10-24 PROCEDURE — 93005 ELECTROCARDIOGRAM TRACING: CPT

## 2019-10-24 PROCEDURE — 85025 COMPLETE CBC W/AUTO DIFF WBC: CPT

## 2019-10-24 PROCEDURE — 96374 THER/PROPH/DIAG INJ IV PUSH: CPT

## 2019-10-24 PROCEDURE — 74011000250 HC RX REV CODE- 250: Performed by: EMERGENCY MEDICINE

## 2019-10-24 PROCEDURE — 82550 ASSAY OF CK (CPK): CPT

## 2019-10-24 PROCEDURE — 94640 AIRWAY INHALATION TREATMENT: CPT

## 2019-10-24 PROCEDURE — 99285 EMERGENCY DEPT VISIT HI MDM: CPT

## 2019-10-24 PROCEDURE — 74011250636 HC RX REV CODE- 250/636: Performed by: EMERGENCY MEDICINE

## 2019-10-24 PROCEDURE — 84484 ASSAY OF TROPONIN QUANT: CPT

## 2019-10-24 PROCEDURE — 80053 COMPREHEN METABOLIC PANEL: CPT

## 2019-10-24 RX ORDER — SODIUM CHLORIDE 0.9 % (FLUSH) 0.9 %
10 SYRINGE (ML) INJECTION
Status: COMPLETED | OUTPATIENT
Start: 2019-10-24 | End: 2019-10-25

## 2019-10-24 RX ORDER — KETOROLAC TROMETHAMINE 30 MG/ML
30 INJECTION, SOLUTION INTRAMUSCULAR; INTRAVENOUS
Status: COMPLETED | OUTPATIENT
Start: 2019-10-24 | End: 2019-10-24

## 2019-10-24 RX ORDER — FENTANYL CITRATE 50 UG/ML
100 INJECTION, SOLUTION INTRAMUSCULAR; INTRAVENOUS
Status: COMPLETED | OUTPATIENT
Start: 2019-10-24 | End: 2019-10-24

## 2019-10-24 RX ORDER — ONDANSETRON 2 MG/ML
4 INJECTION INTRAMUSCULAR; INTRAVENOUS
Status: COMPLETED | OUTPATIENT
Start: 2019-10-24 | End: 2019-10-24

## 2019-10-24 RX ORDER — ALBUTEROL SULFATE 2.5 MG/.5ML
5 SOLUTION RESPIRATORY (INHALATION)
Status: COMPLETED | OUTPATIENT
Start: 2019-10-24 | End: 2019-10-24

## 2019-10-24 RX ADMIN — METHYLPREDNISOLONE SODIUM SUCCINATE 125 MG: 125 INJECTION, POWDER, FOR SOLUTION INTRAMUSCULAR; INTRAVENOUS at 23:24

## 2019-10-24 RX ADMIN — SODIUM CHLORIDE 1000 ML: 900 INJECTION, SOLUTION INTRAVENOUS at 23:29

## 2019-10-24 RX ADMIN — ONDANSETRON 4 MG: 2 INJECTION INTRAMUSCULAR; INTRAVENOUS at 23:24

## 2019-10-24 RX ADMIN — FENTANYL CITRATE 100 MCG: 50 INJECTION INTRAMUSCULAR; INTRAVENOUS at 23:23

## 2019-10-24 RX ADMIN — KETOROLAC TROMETHAMINE 30 MG: 30 INJECTION, SOLUTION INTRAMUSCULAR at 23:24

## 2019-10-24 RX ADMIN — ALBUTEROL SULFATE 5 MG: 2.5 SOLUTION RESPIRATORY (INHALATION) at 23:24

## 2019-10-25 VITALS
SYSTOLIC BLOOD PRESSURE: 103 MMHG | TEMPERATURE: 98.8 F | OXYGEN SATURATION: 99 % | BODY MASS INDEX: 32.03 KG/M2 | RESPIRATION RATE: 21 BRPM | HEIGHT: 60 IN | DIASTOLIC BLOOD PRESSURE: 58 MMHG | HEART RATE: 99 BPM | WEIGHT: 163.14 LBS

## 2019-10-25 LAB
ATRIAL RATE: 100 BPM
CALCULATED P AXIS, ECG09: 70 DEGREES
CALCULATED R AXIS, ECG10: 19 DEGREES
CALCULATED T AXIS, ECG11: 46 DEGREES
DIAGNOSIS, 93000: NORMAL
P-R INTERVAL, ECG05: 114 MS
Q-T INTERVAL, ECG07: 310 MS
QRS DURATION, ECG06: 84 MS
QTC CALCULATION (BEZET), ECG08: 399 MS
VENTRICULAR RATE, ECG03: 100 BPM

## 2019-10-25 PROCEDURE — 74011636320 HC RX REV CODE- 636/320: Performed by: EMERGENCY MEDICINE

## 2019-10-25 RX ORDER — PREDNISONE 10 MG/1
TABLET ORAL
Qty: 48 TAB | Refills: 0 | Status: SHIPPED | OUTPATIENT
Start: 2019-10-25 | End: 2020-04-20

## 2019-10-25 RX ORDER — AZITHROMYCIN 250 MG/1
TABLET, FILM COATED ORAL
Qty: 6 TAB | Refills: 0 | Status: SHIPPED | OUTPATIENT
Start: 2019-10-25 | End: 2020-04-13 | Stop reason: ALTCHOICE

## 2019-10-25 RX ORDER — ALBUTEROL SULFATE 0.83 MG/ML
2.5 SOLUTION RESPIRATORY (INHALATION)
Qty: 90 NEBULE | Refills: 0 | Status: SHIPPED | OUTPATIENT
Start: 2019-10-25

## 2019-10-25 RX ORDER — OXYCODONE HYDROCHLORIDE 5 MG/1
5 TABLET ORAL
Qty: 12 TAB | Refills: 0 | Status: SHIPPED | OUTPATIENT
Start: 2019-10-25 | End: 2019-10-28

## 2019-10-25 RX ADMIN — Medication 10 ML: at 00:20

## 2019-10-25 RX ADMIN — IOPAMIDOL 60 ML: 755 INJECTION, SOLUTION INTRAVENOUS at 00:20

## 2019-10-25 NOTE — DISCHARGE INSTRUCTIONS
Patient Education        Sarcoidosis: Care Instructions  Your Care Instructions    Sarcoidosis (say \"yyw-rgw-FAI-gino\") is a rare disease that causes tiny lumps of cells throughout the body called granulomas. These lumps are too small to see or feel. They can form anywhere on the inside or outside of the body and can cause permanent scar tissue. They often form in the lungs, lymph nodes, liver, skin, or eyes. Sarcoidosis may affect how an organ works. For instance, if it is in the lungs, you may be short of breath. For most people, sarcoidosis is a long-term disease that lasts several years or a lifetime. But some cases go away in a few months. Experts have no way of knowing how it will affect you. For some people, the disease may cause no symptoms at all. For others, symptoms may include fever, body aches, swollen lymph glands, shortness of breath, painful joints, and numbness. It may lead to lung or heart problems. Sometimes sarcoidosis can cause high calcium levels in the blood. Sarcoidosis occurs most often in young and middle-aged adults. Although the cause is not known, the disease does not spread from person to person. Different types of sarcoidosis have different treatments. Sarcoidosis may require long-term treatment (lasting months to years) with corticosteroids and other medicines, especially if it causes symptoms. You may also need regular tests. Follow-up care is a key part of your treatment and safety. Be sure to make and go to all appointments, and call your doctor if you are having problems. It's also a good idea to know your test results and keep a list of the medicines you take. How can you care for yourself at home? · Take your medicines exactly as prescribed. Call your doctor if you think you are having a problem with your medicine. · Do not smoke. Smoking can make sarcoidosis worse. If you need help quitting, talk to your doctor about stop-smoking programs and medicines.  These can increase your chances of quitting for good. · Avoid dust, smoke, and fumes. They can harm your lungs. · Drink plenty of fluids, enough so that your urine is light yellow or clear like water. If you have kidney, heart, or liver disease and have to limit fluids, talk with your doctor before you increase the amount of fluids you drink. · If your doctor recommends it, get more exercise. Walking is a good choice. Bit by bit, increase the amount you walk every day. Try for at least 30 minutes on most days of the week. You also may want to swim, bike, or do other activities. When should you call for help? Call 911 anytime you think you may need emergency care. For example, call if:    · You have severe trouble breathing.     · You passed out (lost consciousness).    Call your doctor now or seek immediate medical care if:    · You have changes in your vision.     · You are very tired, get confused, or urinate a lot.     · Your symptoms do not get better, or they get worse.    Watch closely for changes in your health, and be sure to contact your doctor if you have any problems. Where can you learn more? Go to http://sandrine-shaista.info/. Enter [phone] in the search box to learn more about \"Sarcoidosis: Care Instructions. \"  Current as of: June 9, 2019  Content Version: 12.2  © 3927-8703 [employer]. Care instructions adapted under license by [employer] (which disclaims liability or warranty for this information). If you have questions about a medical condition or this instruction, always ask your healthcare professional. [place] [address] any warranty or liability for your use of this information. Patient Education        Chest Pain: Care Instructions  Your Care Instructions    There are many things that can cause chest pain. Some are not serious and will get better on their own in a few days. But some kinds of chest pain need more testing and treatment.  Your doctor may have recommended a follow-up visit in the next 8 to 12 hours. If you are not getting better, you may need more tests or treatment. Even though your doctor has released you, you still need to watch for any problems. The doctor carefully checked you, but sometimes problems can develop later. If you have new symptoms or if your symptoms do not get better, get medical care right away. If you have worse or different chest pain or pressure that lasts more than 5 minutes or you passed out (lost consciousness), call 911 or seek other emergency help right away. A medical visit is only one step in your treatment. Even if you feel better, you still need to do what your doctor recommends, such as going to all suggested follow-up appointments and taking medicines exactly as directed. This will help you recover and help prevent future problems. How can you care for yourself at home? · Rest until you feel better. · Take your medicine exactly as prescribed. Call your doctor if you think you are having a problem with your medicine. · Do not drive after taking a prescription pain medicine. When should you call for help? Call 911 if:    · You passed out (lost consciousness).     · You have severe difficulty breathing.     · You have symptoms of a heart attack. These may include:  ? Chest pain or pressure, or a strange feeling in your chest.  ? Sweating. ? Shortness of breath. ? Nausea or vomiting. ? Pain, pressure, or a strange feeling in your back, neck, jaw, or upper belly or in one or both shoulders or arms. ? Lightheadedness or sudden weakness. ? A fast or irregular heartbeat. After you call 911, the  may tell you to chew 1 adult-strength or 2 to 4 low-dose aspirin. Wait for an ambulance.  Do not try to drive yourself.    Call your doctor today if:    · You have any trouble breathing.     · Your chest pain gets worse.     · You are dizzy or lightheaded, or you feel like you may faint.     · You are not getting better as expected.     · You are having new or different chest pain. Where can you learn more? Go to http://sandrine-shaista.info/. Enter A120 in the search box to learn more about \"Chest Pain: Care Instructions. \"  Current as of: June 26, 2019  Content Version: 12.2  © 6511-7561 [employer]. Care instructions adapted under license by [employer] (which disclaims liability or warranty for this information). If you have questions about a medical condition or this instruction, always ask your healthcare professional. [place] [address] any warranty or liability for your use of this information.        HOLD ANTIBIOTICS FOR 48 HOURS     10 DEEP BREATHS EVERY HOUR WHILE AWAKE TO IMPROVE AIR MOVEMENT     STOP SMOKING     IF YOUR INSURANCE WILL NOT COVER THE NEBULIZER, YOU CAN TAKE PRESCRIPTION TO [facility] AND CASH PRICE IS $30-40

## 2019-10-25 NOTE — ED NOTES
MD Gerrianne Lennox reviewed discharge instructions with the patient and spouse. The patient and spouse verbalized understanding. Patient discharged home with stable vitals. Patient out of ED in a wheelchair with discharge instructions in hand. Opportunity for questions and clarification provided. No further complaints noted at this time.

## 2019-10-25 NOTE — ED NOTES
Patient presents to the ED ambulatory accompanied by her son and . Patient reports shortness of breath and chest pain related to coughing. Patient reports being evaluated and treated by [facility] on Tuesday for similar symptoms. Patient reports a PMH to include sarcoidosis and asthma. Patient placed on the monitor x3 and provided with her call bell.

## 2019-10-28 NOTE — ED PROVIDER NOTES
EMERGENCY DEPARTMENT HISTORY AND PHYSICAL EXAM      Date: 10/24/2019  Patient Name: Mona Bennett    History of Presenting Illness     Chief Complaint   Patient presents with    Chest Pain     Chest pain for a couple of days. She was also having periods where the son states that she would stop breathing. She has sarcoidosis and sees [facility]. History Provided By: Patient, Patient's Son and EMS    HPI: Mona Bennett, 41 y.o. female presents to the ED with cc of SOA. Pt states new dx of Sarcoid recently . SHe recently completed a round of steroids about 2 weeks ago. Since then her pain has worsened and due to pain she feels like she can not breath. Pain sharp, stabbing rated 5/10, no alleviating factors. Taking a breath and moving worsen her pain. No fever. There has been productive cough along with SOA and wheezing. She denies any abd pain. No pain or swelling of the legs. There are no other complaints, changes, or physical findings at this time. PCP: Unknown, Provider    No current facility-administered medications on file prior to encounter. Current Outpatient Medications on File Prior to Encounter   Medication Sig Dispense Refill    ondansetron (ZOFRAN ODT) 4 mg disintegrating tablet Take 1 Tab by mouth every eight (8) hours as needed for Nausea. 10 Tab 0       Past History     Past Medical History:  Past Medical History:   Diagnosis Date    Anemia     Headache     Hypertension     Obesity     PUD (peptic ulcer disease) 2002    resolved    Sarcoidosis of lymph nodes     Stroke [facility])     patient states TIA       Past Surgical History:  Past Surgical History:   Procedure Laterality Date    HX GYN      Left ovary and fallopian tube removed, D&C, tubal ligation    HX OTHER SURGICAL  2014    Endometrial Ablation, Dr. Jeanette Mae HX OTHER SURGICAL  8/2015    lymph node removal    HX OTHER SURGICAL Left 8/27/15    excision of left groin lymph node.     HX TUBAL LIGATION  2005    Dr. Suhas Jon       Family History:  Family History   Problem Relation Age of Onset    Hypertension Mother     Cancer Maternal Grandfather         stomach       Social History:  Social History     Tobacco Use    Smoking status: Current Every Day Smoker     Packs/day: 0.25     Years: 15.00     Pack years: 3.75    Smokeless tobacco: Never Used   Substance Use Topics    Alcohol use: Yes     Comment: occasionally    Drug use: No     Types: Prescription, OTC       Allergies: Allergies   Allergen Reactions    Latex Hives    Motrin [Ibuprofen] Hives     Allergic just to the coating of brand name Motrin, ok to take generic    Tramadol Rash         Review of Systems   Review of Systems   Constitutional: Negative. Negative for appetite change, chills, fatigue and fever. HENT: Negative. Negative for congestion, rhinorrhea, sinus pressure and sore throat. Eyes: Negative. Respiratory: Positive for cough, chest tightness, shortness of breath and wheezing. Negative for choking. Cardiovascular: Positive for chest pain. Negative for palpitations and leg swelling. Gastrointestinal: Negative for abdominal pain, constipation, diarrhea, nausea and vomiting. Endocrine: Negative. Genitourinary: Negative. Negative for difficulty urinating, dysuria, flank pain and urgency. Musculoskeletal: Negative. Skin: Negative. Neurological: Negative. Negative for dizziness, speech difficulty, weakness, light-headedness, numbness and headaches. Psychiatric/Behavioral: Negative. All other systems reviewed and are negative. Physical Exam   Physical Exam   Constitutional: She is oriented to person, place, and time. She appears well-developed and well-nourished. She appears distressed. HENT:   Head: Normocephalic and atraumatic. Mouth/Throat: Oropharynx is clear and moist. No oropharyngeal exudate. Eyes: Pupils are equal, round, and reactive to light.  Conjunctivae and EOM are normal.   Neck: Normal range of motion. Neck supple. No JVD present. No tracheal deviation present. Cardiovascular: Normal rate, regular rhythm, normal heart sounds and intact distal pulses. No murmur heard. Pulmonary/Chest: Effort normal. No stridor. No respiratory distress. She has wheezes. She has no rales. She exhibits tenderness. Abdominal: Soft. She exhibits no distension. There is no tenderness. There is no rebound and no guarding. obese   Musculoskeletal: Normal range of motion. She exhibits no edema or tenderness. Neurological: She is alert and oriented to person, place, and time. No cranial nerve deficit. No gross motor or sensory deficits    Skin: Skin is warm and dry. She is not diaphoretic. Psychiatric: She has a normal mood and affect. Her behavior is normal.   Nursing note and vitals reviewed. Diagnostic Study Results     Labs -   No results found for this or any previous visit (from the past 12 hour(s)). Radiologic Studies -   CTA CHEST W OR W WO CONT   Final Result   IMPRESSION: No pulmonary embolus or other acute cardiopulmonary process. Reduced   axillary and mediastinal adenopathy. CT Results  (Last 48 hours)    None        CXR Results  (Last 48 hours)    None          Medical Decision Making   I am the first provider for this patient. I reviewed the vital signs, available nursing notes, past medical history, past surgical history, family history and social history. Vital Signs-Reviewed the patient's vital signs. EKG interpretation: (Preliminary)  Sinus tach, rate 100, normal axis/pr/qrs, no acute ST changes. Records Reviewed: Nursing Notes, Old Medical Records, Ambulance Run Sheet, Previous Radiology Studies and Previous Laboratory Studies    Provider Notes (Medical Decision Making):   DDx- PE, pleurisy, pneumonia, pneumothorax    ED Course:   Initial assessment performed.  The patients presenting problems have been discussed, and they are in agreement with the care plan formulated and outlined with them. I have encouraged them to ask questions as they arise throughout their visit. Pt with recent diagnosis. Due to pain pt feels that she cant get a breath. Despite this pt still smokes. I have discussed with her continued use is only going to worsen her overall status and lead to worse outcomes. Discuss potetntial ways to quit, abstinence, replacement and meds. Critical Care Time:   None    Disposition:  DC home, f/u with Pulmonary    PLAN:  1. Discharge Medication List as of 10/25/2019  2:14 AM      START taking these medications    Details   predniSONE (STERAPRED DS) 10 mg dose pack Take as directed, Print, Disp-48 Tab, R-0      albuterol (PROVENTIL VENTOLIN) 2.5 mg /3 mL (0.083 %) nebu 3 mL by Nebulization route every four (4) hours as needed for Cough. , Print, Disp-90 Nebule, R-0      Compressor, For Nebulizer kim 1 Each by Does Not Apply route every four (4) hours as needed (Shortness of breath). , Print, Disp-1 Device, R-0      oxyCODONE IR (ROXICODONE) 5 mg immediate release tablet Take 1 Tab by mouth every six (6) hours as needed for Pain for up to 3 days. Max Daily Amount: 20 mg., Print, Disp-12 Tab, R-0      azithromycin (ZITHROMAX) 250 mg tablet Take 2 tabs on day 1, then 1 tab a day for 4 days, Print, Disp-6 Tab, R-0         CONTINUE these medications which have NOT CHANGED    Details   ondansetron (ZOFRAN ODT) 4 mg disintegrating tablet Take 1 Tab by mouth every eight (8) hours as needed for Nausea. , Print, Disp-10 Tab, R-0           2. Follow-up Information     Follow up With Specialties Details Why Contact Info    Unknown, Provider    Patient not available to ask          Return to ED if worse     Diagnosis     Clinical Impression:   1. Chest wall pain    2. Sarcoidosis of lung [facility])        Attestations:    Elvin Whalen, DO    Please note that this dictation was completed with [employer] voice recognition software.   Quite often unanticipated grammatical, syntax, homophones, and other interpretive errors are inadvertently transcribed by the computer software. Please disregard these errors. Please excuse any errors that have escaped final proofreading. Thank you.

## 2020-02-06 ENCOUNTER — HOSPITAL ENCOUNTER (OUTPATIENT)
Dept: NON INVASIVE DIAGNOSTICS | Age: 40
Discharge: HOME | End: 2020-02-06
Attending: INTERNAL MEDICINE
Payer: MEDICAID

## 2020-02-06 VITALS
SYSTOLIC BLOOD PRESSURE: 124 MMHG | WEIGHT: 163.14 LBS | DIASTOLIC BLOOD PRESSURE: 68 MMHG | HEIGHT: 60 IN | BODY MASS INDEX: 32.03 KG/M2

## 2020-02-06 DIAGNOSIS — D86.0 SARCOIDOSIS, LUNG (HCC): ICD-10-CM

## 2020-02-06 LAB
AV PEAK GRADIENT: 84.86 MMHG
AV VELOCITY RATIO: 0.75
ECHO AV AREA PEAK VELOCITY: 2.7 CM2
ECHO AV PEAK GRADIENT: 4.5 MMHG
ECHO AV PEAK VELOCITY: 105.71 CM/S
ECHO AV REGURGITANT PHT: 576.7 CM
ECHO EST RA PRESSURE: 10 MMHG
ECHO LA AREA 4C: 11 CM2
ECHO LA VOL 4C: 23.64 ML (ref 22–52)
ECHO LA VOLUME INDEX A4C: 13.81 ML/M2 (ref 16–28)
ECHO LV E' LATERAL VELOCITY: 12.21 CM/S
ECHO LV E' SEPTAL VELOCITY: 8.62 CM/S
ECHO LV EDV A4C: 88 ML
ECHO LV EDV INDEX A4C: 51.4 ML/M2
ECHO LV EJECTION FRACTION A4C: 41 %
ECHO LV ESV A4C: 52.2 ML
ECHO LV ESV INDEX A4C: 30.5 ML/M2
ECHO LV INTERNAL DIMENSION DIASTOLIC MMODE: 5.16 CM
ECHO LV INTERNAL DIMENSION SYSTOLIC MMODE: 3 CM
ECHO LV IVSD MMODE: 0.95 CM
ECHO LV IVSS MMODE: 1.81 CM
ECHO LV POSTERIOR WALL DIASTOLIC MMODE: 0.86 CM
ECHO LV POSTERIOR WALL SYSTOLIC MMODE: 1.59 CM
ECHO LVOT DIAM: 2.13 CM
ECHO LVOT PEAK GRADIENT: 2.5 MMHG
ECHO LVOT PEAK VELOCITY: 79.52 CM/S
ECHO MV A VELOCITY: 77.31 CM/S
ECHO MV E DECELERATION TIME (DT): 168.7 MS
ECHO MV E VELOCITY: 101.02 CM/S
ECHO MV E/A RATIO: 1.31
ECHO MV E/E' LATERAL: 8.27
ECHO MV E/E' RATIO (AVERAGED): 10
ECHO MV E/E' SEPTAL: 11.72
ECHO MV REGURGITANT PEAK GRADIENT: 68.5 MMHG
ECHO MV REGURGITANT PEAK VELOCITY: 413.74 CM/S
ECHO PULMONARY ARTERY SYSTOLIC PRESSURE (PASP): 24.7 MMHG
ECHO PV MAX VELOCITY: 59.58 CM/S
ECHO PV PEAK GRADIENT: 1.4 MMHG
ECHO RIGHT VENTRICULAR SYSTOLIC PRESSURE (RVSP): 24.7 MMHG
ECHO RV INTERNAL DIMENSION: 2.39 CM
ECHO TV A WAVE: 44.51 CM/S
ECHO TV E WAVE: 52.4 CM/S
ECHO TV EROA: 0.8
ECHO TV REGURGITANT MAX VELOCITY: 191.62 CM/S
ECHO TV REGURGITANT PEAK GRADIENT: 14.7 MMHG
LVFS: 41.88 %
MV DEC SLOPE: 5.99
PISA AR MAX VEL: 460.59 CM/S
PULMONARY ARTERY END DIASTOLIC PRESSURE: 16.8 MMHG
PULMONARY ARTERY MEAN PRESURE: 19.4 MMHG
PV END DIASTOLIC VELOCITY: 1.3 MMHG

## 2020-02-06 PROCEDURE — 93306 TTE W/DOPPLER COMPLETE: CPT

## 2020-04-13 RX ORDER — PHENOL/SODIUM PHENOLATE
20 AEROSOL, SPRAY (ML) MUCOUS MEMBRANE DAILY
COMMUNITY

## 2020-04-13 RX ORDER — ASCORBIC ACID 250 MG
250 TABLET ORAL DAILY
COMMUNITY

## 2020-04-13 RX ORDER — BUDESONIDE AND FORMOTEROL FUMARATE DIHYDRATE 160; 4.5 UG/1; UG/1
2 AEROSOL RESPIRATORY (INHALATION)
COMMUNITY

## 2020-04-13 RX ORDER — CALCIUM/MAGNESIUM/ZINC 333-133 MG
1 TABLET ORAL DAILY
COMMUNITY

## 2020-04-13 RX ORDER — HYDROXYCHLOROQUINE SULFATE 200 MG/1
200 TABLET, FILM COATED ORAL DAILY
COMMUNITY

## 2020-04-20 ENCOUNTER — ANESTHESIA (OUTPATIENT)
Dept: SURGERY | Age: 40
End: 2020-04-20
Payer: MEDICAID

## 2020-04-20 ENCOUNTER — ANESTHESIA EVENT (OUTPATIENT)
Dept: SURGERY | Age: 40
End: 2020-04-20
Payer: MEDICAID

## 2020-04-20 ENCOUNTER — HOSPITAL ENCOUNTER (OUTPATIENT)
Age: 40
Setting detail: OUTPATIENT SURGERY
Discharge: HOME | End: 2020-04-20
Attending: OBSTETRICS & GYNECOLOGY | Admitting: OBSTETRICS & GYNECOLOGY
Payer: MEDICAID

## 2020-04-20 VITALS
RESPIRATION RATE: 14 BRPM | HEART RATE: 89 BPM | TEMPERATURE: 98 F | OXYGEN SATURATION: 98 % | HEIGHT: 61 IN | SYSTOLIC BLOOD PRESSURE: 127 MMHG | DIASTOLIC BLOOD PRESSURE: 84 MMHG | BODY MASS INDEX: 32.1 KG/M2 | WEIGHT: 170 LBS

## 2020-04-20 PROBLEM — N87.9 CERVICAL DYSPLASIA: Status: ACTIVE | Noted: 2020-04-20

## 2020-04-20 LAB — HCG UR QL: NEGATIVE

## 2020-04-20 PROCEDURE — 77030019905 HC CATH URETH INTMIT MDII -A: Performed by: OBSTETRICS & GYNECOLOGY

## 2020-04-20 PROCEDURE — 77030018724 HC ELCTRD LP RND UTMD -B: Performed by: OBSTETRICS & GYNECOLOGY

## 2020-04-20 PROCEDURE — 77030002996 HC SUT SLK J&J -A: Performed by: OBSTETRICS & GYNECOLOGY

## 2020-04-20 PROCEDURE — 74011250637 HC RX REV CODE- 250/637: Performed by: ANESTHESIOLOGY

## 2020-04-20 PROCEDURE — 77030003666 HC NDL SPINAL BD -A: Performed by: OBSTETRICS & GYNECOLOGY

## 2020-04-20 PROCEDURE — 88305 TISSUE EXAM BY PATHOLOGIST: CPT

## 2020-04-20 PROCEDURE — 77030040922 HC BLNKT HYPOTHRM STRY -A

## 2020-04-20 PROCEDURE — 77030018722 HC ELCTRD BALL LEEP UTMD -B: Performed by: OBSTETRICS & GYNECOLOGY

## 2020-04-20 PROCEDURE — 77030029684 HC NEB SM VOL KT MONA -A

## 2020-04-20 PROCEDURE — 76210000006 HC OR PH I REC 0.5 TO 1 HR: Performed by: OBSTETRICS & GYNECOLOGY

## 2020-04-20 PROCEDURE — 74011250636 HC RX REV CODE- 250/636: Performed by: ANESTHESIOLOGY

## 2020-04-20 PROCEDURE — 74011000250 HC RX REV CODE- 250: Performed by: OBSTETRICS & GYNECOLOGY

## 2020-04-20 PROCEDURE — 88342 IMHCHEM/IMCYTCHM 1ST ANTB: CPT

## 2020-04-20 PROCEDURE — 74011250636 HC RX REV CODE- 250/636: Performed by: NURSE ANESTHETIST, CERTIFIED REGISTERED

## 2020-04-20 PROCEDURE — 77030018836 HC SOL IRR NACL ICUM -A: Performed by: OBSTETRICS & GYNECOLOGY

## 2020-04-20 PROCEDURE — 74011000250 HC RX REV CODE- 250: Performed by: NURSE ANESTHETIST, CERTIFIED REGISTERED

## 2020-04-20 PROCEDURE — 76210000021 HC REC RM PH II 0.5 TO 1 HR: Performed by: OBSTETRICS & GYNECOLOGY

## 2020-04-20 PROCEDURE — 81025 URINE PREGNANCY TEST: CPT

## 2020-04-20 PROCEDURE — 88307 TISSUE EXAM BY PATHOLOGIST: CPT

## 2020-04-20 PROCEDURE — 76010000138 HC OR TIME 0.5 TO 1 HR: Performed by: OBSTETRICS & GYNECOLOGY

## 2020-04-20 PROCEDURE — 77030011640 HC PAD GRND REM COVD -A: Performed by: OBSTETRICS & GYNECOLOGY

## 2020-04-20 PROCEDURE — 74011000250 HC RX REV CODE- 250

## 2020-04-20 PROCEDURE — 76060000032 HC ANESTHESIA 0.5 TO 1 HR: Performed by: OBSTETRICS & GYNECOLOGY

## 2020-04-20 RX ORDER — SODIUM CHLORIDE, SODIUM LACTATE, POTASSIUM CHLORIDE, CALCIUM CHLORIDE 600; 310; 30; 20 MG/100ML; MG/100ML; MG/100ML; MG/100ML
125 INJECTION, SOLUTION INTRAVENOUS CONTINUOUS
Status: DISCONTINUED | OUTPATIENT
Start: 2020-04-20 | End: 2020-04-20 | Stop reason: HOSPADM

## 2020-04-20 RX ORDER — MIDAZOLAM HYDROCHLORIDE 1 MG/ML
1 INJECTION, SOLUTION INTRAMUSCULAR; INTRAVENOUS AS NEEDED
Status: DISCONTINUED | OUTPATIENT
Start: 2020-04-20 | End: 2020-04-20 | Stop reason: HOSPADM

## 2020-04-20 RX ORDER — ROPIVACAINE HYDROCHLORIDE 5 MG/ML
30 INJECTION, SOLUTION EPIDURAL; INFILTRATION; PERINEURAL ONCE
Status: DISCONTINUED | OUTPATIENT
Start: 2020-04-20 | End: 2020-04-20 | Stop reason: HOSPADM

## 2020-04-20 RX ORDER — PROPOFOL 10 MG/ML
INJECTION, EMULSION INTRAVENOUS
Status: DISCONTINUED | OUTPATIENT
Start: 2020-04-20 | End: 2020-04-20 | Stop reason: HOSPADM

## 2020-04-20 RX ORDER — DEXAMETHASONE SODIUM PHOSPHATE 4 MG/ML
INJECTION, SOLUTION INTRA-ARTICULAR; INTRALESIONAL; INTRAMUSCULAR; INTRAVENOUS; SOFT TISSUE AS NEEDED
Status: DISCONTINUED | OUTPATIENT
Start: 2020-04-20 | End: 2020-04-20 | Stop reason: HOSPADM

## 2020-04-20 RX ORDER — LIDOCAINE HYDROCHLORIDE 20 MG/ML
INJECTION, SOLUTION EPIDURAL; INFILTRATION; INTRACAUDAL; PERINEURAL AS NEEDED
Status: DISCONTINUED | OUTPATIENT
Start: 2020-04-20 | End: 2020-04-20 | Stop reason: HOSPADM

## 2020-04-20 RX ORDER — LIDOCAINE HYDROCHLORIDE 10 MG/ML
0.1 INJECTION, SOLUTION EPIDURAL; INFILTRATION; INTRACAUDAL; PERINEURAL AS NEEDED
Status: DISCONTINUED | OUTPATIENT
Start: 2020-04-20 | End: 2020-04-20 | Stop reason: HOSPADM

## 2020-04-20 RX ORDER — FENTANYL CITRATE 50 UG/ML
50 INJECTION, SOLUTION INTRAMUSCULAR; INTRAVENOUS AS NEEDED
Status: DISCONTINUED | OUTPATIENT
Start: 2020-04-20 | End: 2020-04-20 | Stop reason: HOSPADM

## 2020-04-20 RX ORDER — IPRATROPIUM BROMIDE AND ALBUTEROL SULFATE 2.5; .5 MG/3ML; MG/3ML
SOLUTION RESPIRATORY (INHALATION)
Status: COMPLETED
Start: 2020-04-20 | End: 2020-04-20

## 2020-04-20 RX ORDER — FENTANYL CITRATE 50 UG/ML
25 INJECTION, SOLUTION INTRAMUSCULAR; INTRAVENOUS
Status: DISCONTINUED | OUTPATIENT
Start: 2020-04-20 | End: 2020-04-20 | Stop reason: HOSPADM

## 2020-04-20 RX ORDER — SODIUM CHLORIDE 0.9 % (FLUSH) 0.9 %
5-40 SYRINGE (ML) INJECTION AS NEEDED
Status: DISCONTINUED | OUTPATIENT
Start: 2020-04-20 | End: 2020-04-20 | Stop reason: HOSPADM

## 2020-04-20 RX ORDER — SODIUM CHLORIDE, SODIUM LACTATE, POTASSIUM CHLORIDE, CALCIUM CHLORIDE 600; 310; 30; 20 MG/100ML; MG/100ML; MG/100ML; MG/100ML
75 INJECTION, SOLUTION INTRAVENOUS CONTINUOUS
Status: DISCONTINUED | OUTPATIENT
Start: 2020-04-20 | End: 2020-04-20 | Stop reason: HOSPADM

## 2020-04-20 RX ORDER — FENTANYL CITRATE 50 UG/ML
INJECTION, SOLUTION INTRAMUSCULAR; INTRAVENOUS AS NEEDED
Status: DISCONTINUED | OUTPATIENT
Start: 2020-04-20 | End: 2020-04-20 | Stop reason: HOSPADM

## 2020-04-20 RX ORDER — HYDROMORPHONE HYDROCHLORIDE 1 MG/ML
0.2 INJECTION, SOLUTION INTRAMUSCULAR; INTRAVENOUS; SUBCUTANEOUS
Status: DISCONTINUED | OUTPATIENT
Start: 2020-04-20 | End: 2020-04-20 | Stop reason: HOSPADM

## 2020-04-20 RX ORDER — PROPOFOL 10 MG/ML
INJECTION, EMULSION INTRAVENOUS AS NEEDED
Status: DISCONTINUED | OUTPATIENT
Start: 2020-04-20 | End: 2020-04-20 | Stop reason: HOSPADM

## 2020-04-20 RX ORDER — MORPHINE SULFATE 10 MG/ML
2 INJECTION, SOLUTION INTRAMUSCULAR; INTRAVENOUS
Status: DISCONTINUED | OUTPATIENT
Start: 2020-04-20 | End: 2020-04-20 | Stop reason: HOSPADM

## 2020-04-20 RX ORDER — SODIUM CHLORIDE 9 MG/ML
25 INJECTION, SOLUTION INTRAVENOUS CONTINUOUS
Status: DISCONTINUED | OUTPATIENT
Start: 2020-04-20 | End: 2020-04-20 | Stop reason: HOSPADM

## 2020-04-20 RX ORDER — SODIUM CHLORIDE, SODIUM LACTATE, POTASSIUM CHLORIDE, CALCIUM CHLORIDE 600; 310; 30; 20 MG/100ML; MG/100ML; MG/100ML; MG/100ML
INJECTION, SOLUTION INTRAVENOUS
Status: DISCONTINUED | OUTPATIENT
Start: 2020-04-20 | End: 2020-04-20 | Stop reason: HOSPADM

## 2020-04-20 RX ORDER — MIDAZOLAM HYDROCHLORIDE 1 MG/ML
0.5 INJECTION, SOLUTION INTRAMUSCULAR; INTRAVENOUS
Status: DISCONTINUED | OUTPATIENT
Start: 2020-04-20 | End: 2020-04-20 | Stop reason: HOSPADM

## 2020-04-20 RX ORDER — DIPHENHYDRAMINE HYDROCHLORIDE 50 MG/ML
12.5 INJECTION, SOLUTION INTRAMUSCULAR; INTRAVENOUS AS NEEDED
Status: DISCONTINUED | OUTPATIENT
Start: 2020-04-20 | End: 2020-04-20 | Stop reason: HOSPADM

## 2020-04-20 RX ORDER — FERRIC SUBSULFATE 20-22G/100
SOLUTION, NON-ORAL MISCELLANEOUS AS NEEDED
Status: DISCONTINUED | OUTPATIENT
Start: 2020-04-20 | End: 2020-04-20 | Stop reason: HOSPADM

## 2020-04-20 RX ORDER — SODIUM CHLORIDE 0.9 % (FLUSH) 0.9 %
5-40 SYRINGE (ML) INJECTION EVERY 8 HOURS
Status: DISCONTINUED | OUTPATIENT
Start: 2020-04-20 | End: 2020-04-20 | Stop reason: HOSPADM

## 2020-04-20 RX ORDER — IPRATROPIUM BROMIDE AND ALBUTEROL SULFATE 2.5; .5 MG/3ML; MG/3ML
3 SOLUTION RESPIRATORY (INHALATION)
Status: COMPLETED | OUTPATIENT
Start: 2020-04-20 | End: 2020-04-20

## 2020-04-20 RX ORDER — ONDANSETRON 2 MG/ML
4 INJECTION INTRAMUSCULAR; INTRAVENOUS AS NEEDED
Status: DISCONTINUED | OUTPATIENT
Start: 2020-04-20 | End: 2020-04-20 | Stop reason: HOSPADM

## 2020-04-20 RX ORDER — ONDANSETRON 2 MG/ML
INJECTION INTRAMUSCULAR; INTRAVENOUS AS NEEDED
Status: DISCONTINUED | OUTPATIENT
Start: 2020-04-20 | End: 2020-04-20 | Stop reason: HOSPADM

## 2020-04-20 RX ORDER — LIDOCAINE HYDROCHLORIDE AND EPINEPHRINE 10; 10 MG/ML; UG/ML
INJECTION, SOLUTION INFILTRATION; PERINEURAL AS NEEDED
Status: DISCONTINUED | OUTPATIENT
Start: 2020-04-20 | End: 2020-04-20 | Stop reason: HOSPADM

## 2020-04-20 RX ORDER — ACETAMINOPHEN 325 MG/1
650 TABLET ORAL ONCE
Status: COMPLETED | OUTPATIENT
Start: 2020-04-20 | End: 2020-04-20

## 2020-04-20 RX ADMIN — MEPERIDINE HYDROCHLORIDE 12.5 MG: 25 INJECTION INTRAMUSCULAR; INTRAVENOUS; SUBCUTANEOUS at 11:12

## 2020-04-20 RX ADMIN — PROPOFOL 50 MG: 10 INJECTION, EMULSION INTRAVENOUS at 10:23

## 2020-04-20 RX ADMIN — ACETAMINOPHEN 650 MG: 325 TABLET ORAL at 10:05

## 2020-04-20 RX ADMIN — IPRATROPIUM BROMIDE AND ALBUTEROL SULFATE 3 ML: 2.5; .5 SOLUTION RESPIRATORY (INHALATION) at 11:15

## 2020-04-20 RX ADMIN — PROPOFOL 50 MG: 10 INJECTION, EMULSION INTRAVENOUS at 10:25

## 2020-04-20 RX ADMIN — FENTANYL CITRATE 25 MCG: 50 INJECTION INTRAMUSCULAR; INTRAVENOUS at 11:30

## 2020-04-20 RX ADMIN — ONDANSETRON 4 MG: 2 INJECTION, SOLUTION INTRAMUSCULAR; INTRAVENOUS at 11:12

## 2020-04-20 RX ADMIN — DEXAMETHASONE SODIUM PHOSPHATE 4 MG: 4 INJECTION, SOLUTION INTRAMUSCULAR; INTRAVENOUS at 10:29

## 2020-04-20 RX ADMIN — PROPOFOL 20 MG: 10 INJECTION, EMULSION INTRAVENOUS at 10:39

## 2020-04-20 RX ADMIN — IPRATROPIUM BROMIDE AND ALBUTEROL SULFATE 3 ML: .5; 3 SOLUTION RESPIRATORY (INHALATION) at 11:15

## 2020-04-20 RX ADMIN — PROPOFOL 50 MG: 10 INJECTION, EMULSION INTRAVENOUS at 10:37

## 2020-04-20 RX ADMIN — PROPOFOL 50 MG: 10 INJECTION, EMULSION INTRAVENOUS at 10:27

## 2020-04-20 RX ADMIN — SODIUM CHLORIDE, SODIUM LACTATE, POTASSIUM CHLORIDE, AND CALCIUM CHLORIDE 125 ML/HR: 600; 310; 30; 20 INJECTION, SOLUTION INTRAVENOUS at 10:07

## 2020-04-20 RX ADMIN — FENTANYL CITRATE 25 MCG: 50 INJECTION INTRAMUSCULAR; INTRAVENOUS at 11:25

## 2020-04-20 RX ADMIN — PROPOFOL 75 MCG/KG/MIN: 10 INJECTION, EMULSION INTRAVENOUS at 10:23

## 2020-04-20 RX ADMIN — PROPOFOL 50 MG: 10 INJECTION, EMULSION INTRAVENOUS at 10:24

## 2020-04-20 RX ADMIN — SODIUM CHLORIDE, POTASSIUM CHLORIDE, SODIUM LACTATE AND CALCIUM CHLORIDE: 600; 310; 30; 20 INJECTION, SOLUTION INTRAVENOUS at 10:13

## 2020-04-20 RX ADMIN — MEPERIDINE HYDROCHLORIDE 12.5 MG: 25 INJECTION INTRAMUSCULAR; INTRAVENOUS; SUBCUTANEOUS at 11:15

## 2020-04-20 RX ADMIN — LIDOCAINE HYDROCHLORIDE 60 MG: 20 INJECTION, SOLUTION EPIDURAL; INFILTRATION; INTRACAUDAL; PERINEURAL at 10:23

## 2020-04-20 RX ADMIN — FENTANYL CITRATE 25 MCG: 50 INJECTION, SOLUTION INTRAMUSCULAR; INTRAVENOUS at 10:13

## 2020-04-20 RX ADMIN — ONDANSETRON HYDROCHLORIDE 4 MG: 2 INJECTION, SOLUTION INTRAMUSCULAR; INTRAVENOUS at 10:29

## 2020-04-20 NOTE — ANESTHESIA POSTPROCEDURE EVALUATION
Post-Anesthesia Evaluation and Assessment    Patient: Tracy Vasquez MRN: [id number]  SSN: [id number]    YOB: 1980  Age: 46 y.o. Sex: female      I have evaluated the patient and they are stable and ready for discharge from the PACU. Cardiovascular Function/Vital Signs  Visit Vitals  /84   Pulse 89   Temp 36.7 °C (98 °F)   Resp 14   Ht 5' 1\" (1.549 m)   Wt 77.1 kg (170 lb)   SpO2 98%   BMI 32.12 kg/m²       Patient is status post MAC anesthesia for Procedure(s):  LOOP ELECTRODE EXCISION PROCEDURE OF CERVIX  - LEEP. Nausea/Vomiting: None    Postoperative hydration reviewed and adequate. Pain:  Pain Scale 1: FLACC (04/20/20 1130)  Pain Intensity 1: 3 (04/20/20 1125)   Managed    Neurological Status:   Neuro (WDL): Within Defined Limits (04/20/20 0910)   At baseline    Mental Status, Level of Consciousness: Alert and  oriented to person, place, and time    Pulmonary Status:   O2 Device: Room air (04/20/20 1130)   Adequate oxygenation and airway patent    Complications related to anesthesia: None    Post-anesthesia assessment completed. No concerns    Signed By: Yamile Martino MD     April 20, 2020              Procedure(s):  LOOP ELECTRODE EXCISION PROCEDURE OF CERVIX  - LEEP.     MAC    <BSHSIANPOST>    Vitals Value Taken Time   /62 4/20/2020 11:30 AM   Temp 36.7 °C (98 °F) 4/20/2020 11:30 AM   Pulse 84 4/20/2020 11:30 AM   Resp 20 4/20/2020 11:30 AM   SpO2 99 % 4/20/2020 11:30 AM

## 2020-04-20 NOTE — ANESTHESIA PREPROCEDURE EVALUATION
Relevant Problems   No relevant active problems       Anesthetic History   No history of anesthetic complications            Review of Systems / Medical History  Patient summary reviewed, nursing notes reviewed and pertinent labs reviewed    Pulmonary  Within defined limits                 Neuro/Psych   Within defined limits           Cardiovascular    Hypertension: well controlled                   GI/Hepatic/Renal     GERD: well controlled      PUD     Endo/Other        Obesity  Pertinent negatives: No morbid obesity   Other Findings              Physical Exam    Airway  Mallampati: II  TM Distance: > 6 cm  Neck ROM: normal range of motion   Mouth opening: Normal     Cardiovascular  Regular rate and rhythm,  S1 and S2 normal,  no murmur, click, rub, or gallop             Dental  No notable dental hx       Pulmonary  Breath sounds clear to auscultation               Abdominal  GI exam deferred       Other Findings            Anesthetic Plan    ASA: 2  Anesthesia type: MAC            Anesthetic plan and risks discussed with: Patient

## 2020-04-20 NOTE — ROUTINE PROCESS
Patient: Jakub Nunes MRN: [id number]  SSN: [id number]   YOB: 1980  Age: 46 y.o. Sex: female     Patient is status post Procedure(s):  LOOP ELECTRODE EXCISION PROCEDURE OF CERVIX  - LEEP. Surgeon(s) and Role:     * Yessica Rahman MD - Primary    Local/Dose/Irrigation:                   Peripheral IV 04/20/20 Right Hand (Active)   Site Assessment Clean, dry, & intact 4/20/2020  9:06 AM   Phlebitis Assessment 0 4/20/2020  9:06 AM   Infiltration Assessment 0 4/20/2020  9:06 AM   Dressing Status Clean, dry, & intact 4/20/2020  9:06 AM   Dressing Type Transparent 4/20/2020  9:06 AM   Hub Color/Line Status Pink; Infusing 4/20/2020  9:06 AM                           Dressing/Packing:       Splint/Cast:  ]    Other:

## 2020-04-20 NOTE — PERIOP NOTES
Patient tolerated saltines and drink. VS's stable. 02 sat on room air is 100%. No s/s of nausea, SOB, pain or bleeding. Discussed post-op instructions with spouse, Tru Gross, over the phone. Patient and spouse verbalized understanding of all instructions. All belongings returned.

## 2020-04-20 NOTE — H&P
Gynecology History and Physical    Name: Ansley Reyes MRN: [id number] SSN: [id number]    YOB: 1980  Age: 46 y.o. Sex: female       Subjective:      Chief complaint:  Cervical dysplasia    Michelle Molina is a 46 y.o.  female with a history of cervical dysplasia (CIN2) found on colposcopy on both 12 oclock cervical biopsy and on endocervical curettage. She is a smoker. She has no other complaints. She has a h/o tubal ligation. She is admitted for Procedure(s) (LRB):  LOOP ELECTRODE EXCISION PROCEDURE OF CERVIX  - LEEP (ESSENTIAL) (N/A)        Past Medical History:   Diagnosis Date    Anemia     GERD (gastroesophageal reflux disease)     Headache     Hypertension     NO MEDICATION    Obesity     PUD (peptic ulcer disease) 2002    resolved    Sarcoidosis of lymph nodes     Stroke ([facility] [address] 2017    patient states TIA-SOME WEAKNESS LEFT EYE AND LEFT HAND     Past Surgical History:   Procedure Laterality Date    HX GYN      Left ovary and fallopian tube removed, D&C, tubal ligation    HX OTHER SURGICAL  2014    Endometrial Ablation, Dr. Qasim Millan HX OTHER SURGICAL  8/2015    lymph node removal    HX OTHER SURGICAL Left 8/27/15    excision of left groin lymph node.  HX TUBAL LIGATION  2005    Dr. Luis Jon Zimmerman     OB History    No obstetric history on file. Allergies   Allergen Reactions    Latex Hives    Motrin [Ibuprofen] Hives     Allergic just to the coating of brand name Motrin, ok to take generic    Tramadol Rash     Prior to Admission medications    Medication Sig Start Date End Date Taking? Authorizing Provider   budesonide-formoteroL (Symbicort) 160-4.5 mcg/actuation HFAA Take 2 Puffs by inhalation daily as needed. Yes Provider, Historical   hydroxychloroquine (Plaquenil) 200 mg tablet Take 200 mg by mouth daily. Yes Provider, Historical   Omeprazole delayed release (PRILOSEC D/R) 20 mg tablet Take 20 mg by mouth daily.    Yes Provider, Historical   prenatal vits62/FA/om3/dha/epa (PRENATAL GUMMY PO) Take 2 Tabs by mouth daily. Yes Provider, Historical   ascorbic acid, vitamin C, (Vitamin C) 250 mg tablet Take 250 mg by mouth daily. Yes Provider, Historical   echinacea 400 mg cap Take 1 Tab by mouth daily. Yes Provider, Historical   predniSONE (STERAPRED DS) 10 mg dose pack Take as directed 10/25/19   Joe Mail, DO   albuterol (PROVENTIL VENTOLIN) 2.5 mg /3 mL (0.083 %) nebu 3 mL by Nebulization route every four (4) hours as needed for Cough. 10/25/19   Joe Mail, DO   Compressor, For Nebulizer kim 1 Each by Does Not Apply route every four (4) hours as needed (Shortness of breath).  10/25/19   Joe Mail, DO      Family History   Problem Relation Age of Onset    Hypertension Mother     Cancer Maternal Grandfather         stomach    Hypertension Maternal Grandmother     Diabetes Other         M [employer]    Asthma Neg Hx     Heart Disease Neg Hx      Social History     Socioeconomic History    Marital status: LEGALLY      Spouse name: Not on file    Number of children: Not on file    Years of education: Not on file    Highest education level: Not on file   Occupational History    Not on file   Social Needs    Financial resource strain: Not on file    Food insecurity     Worry: Not on file     Inability: Not on file    Transportation needs     Medical: Not on file     Non-medical: Not on file   Tobacco Use    Smoking status: Current Every Day Smoker     Packs/day: 0.25     Years: 15.00     Pack years: 3.75    Smokeless tobacco: Never Used    Tobacco comment: SMOKES CIGARS @ PER DAY   Substance and Sexual Activity    Alcohol use: Yes     Comment: occasionally    Drug use: No     Types: Prescription, OTC    Sexual activity: Not on file   Lifestyle    Physical activity     Days per week: Not on file     Minutes per session: Not on file    Stress: Not on file   Relationships    Social connections Talks on phone: Not on file     Gets together: Not on file     Attends Jewish service: Not on file     Active member of club or organization: Not on file     Attends meetings of clubs or organizations: Not on file     Relationship status: Not on file    Intimate partner violence     Fear of current or ex partner: Not on file     Emotionally abused: Not on file     Physically abused: Not on file     Forced sexual activity: Not on file   Other Topics Concern    Not on file   Social History Narrative    Not on file       Review of Systems   As noted in HPI    Objective:     Vitals:    04/13/20 1007   Weight: 76.2 kg (168 lb)   Height: 5' 1\" (1.549 m)       Physical Exam   Gen: NAD  Resp: CTAB  CV: RRR  Abd: soft, NT, ND, + BS  Ext: no edema  Pelvic: deferred to OR    Assessment:     Cervical dysplasia (CIN2) here for definitive surgery of LEEP    Plan:     1. Procedure(s) (LRB):  LOOP ELECTRODE EXCISION PROCEDURE OF CERVIX  - LEEP (ESSENTIAL) (N/A)  Discussed the risks of surgery including the risks of bleeding, infection, deep vein thrombosis, and surgical injuries to internal organs including but not limited to the bowels, bladder, rectum, and female reproductive organs. The patient understands the risks; any and all questions were answered to the patient's satisfaction.     Faith Lackey MD

## 2020-04-20 NOTE — OP NOTES
LEEP FULL OP NOTE    [place] Room  [id number]  1980      DATE OF PROCEDURE:  4/20/2020    PREOPERATIVE DIAGNOSIS:  CERVICAL DYSPLASIA    POSTOPERATIVE DIAGNOSIS:  CERVICAL DYSPLASIA    PROCEDURE: Procedure(s):  LOOP ELECTRODE EXCISION PROCEDURE OF CERVIX  - LEEP     SURGEON:  Júnior Felipe MD    ASSISTANT:  none    ANESTHESIA:monitored anesthesia care    EBL: Minimal    SPECIMENS: Ectocervical biopsy, Endocervical biopsies x 2    FINDINGS: Cervical dysplasia, otherwise normal appearing cervix and vagina    DESCRIPTION OF PROCEDURE: The patient was placed on the operating room table in the supine position and placed under general endotracheal anesthesia. Time out was done to confirm the operating procedure, surgeon, patient and site. Once confirmed by the team, procedure was started. PROCEDURE: Patient was placed on the operating table in the supine and after induction of anesthesia she was placed in the dorsal lithotomy position and prepped and draped in the usual fashion for vaginal surgery. Cervix was exposed with a vaginal speculum and was painted with Lugol's solution. 5 mL of 1% lidocaine with epinephrine was used for a paracervical block. A 24mm Loop electrode was used at bovie settings of 50/50 to remove an ectocervical specimen/biopsy and the 12 oclock position was marked with a marking pen. A top hat loop electrode was used for an endocervical biopsy and the specimens were marked at both 12 oclock and 6 oclock positions. The roller ball was used to cauterize edges and hemostasis was excellent. Monsel's solution was applied to the cervix. Instruments were removed. The patient went to the recovery room in satisfactory condition. All counts were correct times two.

## 2020-04-20 NOTE — DISCHARGE INSTRUCTIONS
After Care Instructions For LEEP    1. You may resume your usual diet once the nausea resolves. Initially, you may try sips of warm fluids and a bland diet. 2. Avoid heavy lifting and straining. Gradually increase your activity. First, try walking and doing light activity around the house. Resume your normal habits if no significant discomfort or bleeding develops. Most women can return to work within one to four days after this procedure. 3. You may take showers. Avoid using a tub bath, swimming pool or hot tub until after your check-up. 4. It takes the cervix 5-6 weeks to heal.  During this time, there will be some vaginal bleeding or yellow vaginal discharge. This may increase with activity. Also, this procedure should not alter your normal menstrual cycle. 5. Do not put anything in your vagina as this may cause excessive bleeding or infection. Do not douche, use tampons or have intercourse until after your check-up. 6. It is normal to feel some mild crampy pain in your lower abdomen. This discomfort should be relieved with Tylenol or Motrin. 7. TYLENOL 650 MG GIVEN IN PRE-OP TODAY AT 1005 AM  8. NEXT DOSE FOR TYLENOL 650 MG IS TODAY AT 2 PM    9. Contact the office if you have excessive bleeding (saturating a pad an hour for two hours or passing large clots), chills, or a temperature greater than 100.4. 10. You should be seen in the office following the procedure. Your physician should have given you specific information regarding the appropriate time for this appointment. Please contact the office for an appointment if this has not already been arranged. Our office phone number is [phone]. If appropriate, the microscopic results from your procedure will be discussed at this follow-up visit. Frequently the results have returned prior to this visit and if this is the case, you will be contacted by phone. ______________________________________________________________________    Anesthesia Discharge Instructions    After general anesthesia or intervenous sedation, for 24 hours or while taking prescription Narcotics:  · Limit your activities  · Do not drive or operate hazardous machinery  · If you have not urinated within 8 hours after discharge, please contact your surgeon on call. · Do not make important personal or business decisions  · Do not drink alcoholic beverages    Report the following to your surgeon:  · Excessive pain, swelling, redness or odor of or around the surgical area  · Temperature over 100.5 degrees  · Nausea and vomiting lasting longer than 4 hours or if unable to take medication  · Any signs of decreased circulation or nerve impairment to extremity:  Change in color, persistent numbness, tingling, coldness or increased pain.   · Any questions

## 2021-11-28 ENCOUNTER — HOSPITAL ENCOUNTER (EMERGENCY)
Age: 41
Discharge: HOME | End: 2021-11-29
Attending: EMERGENCY MEDICINE
Payer: MEDICAID

## 2021-11-28 VITALS
BODY MASS INDEX: 27.05 KG/M2 | WEIGHT: 147 LBS | DIASTOLIC BLOOD PRESSURE: 88 MMHG | SYSTOLIC BLOOD PRESSURE: 130 MMHG | HEIGHT: 62 IN | TEMPERATURE: 97.7 F | RESPIRATION RATE: 18 BRPM | OXYGEN SATURATION: 94 % | HEART RATE: 100 BPM

## 2021-11-28 DIAGNOSIS — R10.12 ABDOMINAL PAIN, LUQ (LEFT UPPER QUADRANT): ICD-10-CM

## 2021-11-28 DIAGNOSIS — R10.31 ABDOMINAL PAIN, RIGHT LOWER QUADRANT: Primary | ICD-10-CM

## 2021-11-28 DIAGNOSIS — Z71.1 CONCERN ABOUT STD IN FEMALE WITHOUT DIAGNOSIS: ICD-10-CM

## 2021-11-28 LAB
ALBUMIN SERPL-MCNC: 4 G/DL (ref 3.5–5)
ALBUMIN/GLOB SERPL: 0.8 {RATIO} (ref 1.1–2.2)
ALP SERPL-CCNC: 66 U/L (ref 45–117)
ALT SERPL-CCNC: 91 U/L (ref 12–78)
ANION GAP SERPL CALC-SCNC: 9 MMOL/L (ref 5–15)
APPEARANCE UR: CLEAR
AST SERPL-CCNC: 98 U/L (ref 15–37)
BACTERIA URNS QL MICRO: NEGATIVE /HPF
BASOPHILS # BLD: 0 K/UL (ref 0–0.1)
BASOPHILS NFR BLD: 1 % (ref 0–1)
BILIRUB SERPL-MCNC: 0.4 MG/DL (ref 0.2–1)
BILIRUB UR QL: NEGATIVE
BUN SERPL-MCNC: 8 MG/DL (ref 6–20)
BUN/CREAT SERPL: 10 (ref 12–20)
CALCIUM SERPL-MCNC: 8.8 MG/DL (ref 8.5–10.1)
CHLORIDE SERPL-SCNC: 110 MMOL/L (ref 97–108)
CO2 SERPL-SCNC: 20 MMOL/L (ref 21–32)
COLOR UR: NORMAL
CREAT SERPL-MCNC: 0.78 MG/DL (ref 0.55–1.02)
DIFFERENTIAL METHOD BLD: NORMAL
EOSINOPHIL # BLD: 0.2 K/UL (ref 0–0.4)
EOSINOPHIL NFR BLD: 4 % (ref 0–7)
EPITH CASTS URNS QL MICRO: NORMAL /LPF
ERYTHROCYTE [DISTWIDTH] IN BLOOD BY AUTOMATED COUNT: 13.2 % (ref 11.5–14.5)
GLOBULIN SER CALC-MCNC: 4.8 G/DL (ref 2–4)
GLUCOSE SERPL-MCNC: 117 MG/DL (ref 65–100)
GLUCOSE UR STRIP.AUTO-MCNC: NEGATIVE MG/DL
HCG SERPL-ACNC: <1 MIU/ML (ref 0–6)
HCG UR QL: NEGATIVE
HCT VFR BLD AUTO: 38.3 % (ref 35–47)
HGB BLD-MCNC: 13.1 G/DL (ref 11.5–16)
HGB UR QL STRIP: NEGATIVE
HYALINE CASTS URNS QL MICRO: NORMAL /LPF (ref 0–5)
IMM GRANULOCYTES # BLD AUTO: 0 K/UL (ref 0–0.04)
IMM GRANULOCYTES NFR BLD AUTO: 0 % (ref 0–0.5)
KETONES UR QL STRIP.AUTO: NEGATIVE MG/DL
LEUKOCYTE ESTERASE UR QL STRIP.AUTO: NEGATIVE
LIPASE SERPL-CCNC: 186 U/L (ref 73–393)
LYMPHOCYTES # BLD: 1.1 K/UL (ref 0.8–3.5)
LYMPHOCYTES NFR BLD: 27 % (ref 12–49)
MCH RBC QN AUTO: 30.5 PG (ref 26–34)
MCHC RBC AUTO-ENTMCNC: 34.2 G/DL (ref 30–36.5)
MCV RBC AUTO: 89.1 FL (ref 80–99)
MONOCYTES # BLD: 0.6 K/UL (ref 0–1)
MONOCYTES NFR BLD: 13 % (ref 5–13)
NEUTS SEG # BLD: 2.3 K/UL (ref 1.8–8)
NEUTS SEG NFR BLD: 55 % (ref 32–75)
NITRITE UR QL STRIP.AUTO: NEGATIVE
NRBC # BLD: 0 K/UL (ref 0–0.01)
NRBC BLD-RTO: 0 PER 100 WBC
PH UR STRIP: 5 [PH] (ref 5–8)
PLATELET # BLD AUTO: 263 K/UL (ref 150–400)
PMV BLD AUTO: 9.5 FL (ref 8.9–12.9)
POTASSIUM SERPL-SCNC: 3.9 MMOL/L (ref 3.5–5.1)
PROT SERPL-MCNC: 8.8 G/DL (ref 6.4–8.2)
PROT UR STRIP-MCNC: NEGATIVE MG/DL
RBC # BLD AUTO: 4.3 M/UL (ref 3.8–5.2)
RBC #/AREA URNS HPF: NORMAL /HPF (ref 0–5)
SODIUM SERPL-SCNC: 139 MMOL/L (ref 136–145)
SP GR UR REFRACTOMETRY: 1.01 (ref 1–1.03)
UA: UC IF INDICATED,UAUC: NORMAL
UROBILINOGEN UR QL STRIP.AUTO: 1 EU/DL (ref 0.2–1)
WBC # BLD AUTO: 4.2 K/UL (ref 3.6–11)
WBC URNS QL MICRO: NORMAL /HPF (ref 0–4)

## 2021-11-28 PROCEDURE — 85025 COMPLETE CBC W/AUTO DIFF WBC: CPT

## 2021-11-28 PROCEDURE — 81025 URINE PREGNANCY TEST: CPT

## 2021-11-28 PROCEDURE — 96374 THER/PROPH/DIAG INJ IV PUSH: CPT

## 2021-11-28 PROCEDURE — 81001 URINALYSIS AUTO W/SCOPE: CPT

## 2021-11-28 PROCEDURE — 74011250636 HC RX REV CODE- 250/636: Performed by: EMERGENCY MEDICINE

## 2021-11-28 PROCEDURE — 99283 EMERGENCY DEPT VISIT LOW MDM: CPT

## 2021-11-28 PROCEDURE — 87210 SMEAR WET MOUNT SALINE/INK: CPT

## 2021-11-28 PROCEDURE — 84702 CHORIONIC GONADOTROPIN TEST: CPT

## 2021-11-28 PROCEDURE — 83690 ASSAY OF LIPASE: CPT

## 2021-11-28 PROCEDURE — 96375 TX/PRO/DX INJ NEW DRUG ADDON: CPT

## 2021-11-28 PROCEDURE — 87491 CHLMYD TRACH DNA AMP PROBE: CPT

## 2021-11-28 PROCEDURE — 36415 COLL VENOUS BLD VENIPUNCTURE: CPT

## 2021-11-28 PROCEDURE — 80053 COMPREHEN METABOLIC PANEL: CPT

## 2021-11-28 RX ORDER — ONDANSETRON 2 MG/ML
4 INJECTION INTRAMUSCULAR; INTRAVENOUS
Status: COMPLETED | OUTPATIENT
Start: 2021-11-28 | End: 2021-11-28

## 2021-11-28 RX ORDER — FENTANYL CITRATE 50 UG/ML
25 INJECTION, SOLUTION INTRAMUSCULAR; INTRAVENOUS
Status: COMPLETED | OUTPATIENT
Start: 2021-11-28 | End: 2021-11-28

## 2021-11-28 RX ADMIN — ONDANSETRON 4 MG: 2 INJECTION INTRAMUSCULAR; INTRAVENOUS at 23:19

## 2021-11-28 RX ADMIN — FENTANYL CITRATE 25 MCG: 50 INJECTION INTRAMUSCULAR; INTRAVENOUS at 23:19

## 2021-11-28 RX ADMIN — SODIUM CHLORIDE 1000 ML: 9 INJECTION, SOLUTION INTRAVENOUS at 23:19

## 2021-11-28 NOTE — Clinical Note
[address]  [facility] EMERGENCY DEPT  [address] [place] [address]  [place] [id number]  [phone]    Work/School Note    Date: 11/28/2021    To Whom It May concern:      Sabina Caceres was seen and treated today in the emergency room by the following provider(s):  Attending Provider: Mimi Nelson MD.      Sabina Caceres is excused from work/school on 11/29/21. She is clear to return to work/school on 11/30/21.         Sincerely,          Kailey Smallwood MD

## 2021-11-28 NOTE — Clinical Note
[address]  [facility] EMERGENCY DEPT  [address] [facility]  [place] [id number] [phone]    Work/School Note    Date: 11/28/2021    To Whom It May concern:      Sabina Caceres was seen and treated today in the emergency room by the following provider(s):  Attending Provider: Mimi Nelson MD.      Sabina Caceres is excused from work/school on 11/29/21. She is clear to return to work/school on 11/30/21.         Sincerely,          Kailey Smallwood MD

## 2021-11-29 ENCOUNTER — APPOINTMENT (OUTPATIENT)
Dept: CT IMAGING | Age: 41
End: 2021-11-29
Attending: EMERGENCY MEDICINE
Payer: MEDICAID

## 2021-11-29 LAB
CLUE CELLS VAG QL WET PREP: NORMAL
KOH PREP SPEC: NORMAL
SERVICE CMNT-IMP: NORMAL
T VAGINALIS VAG QL WET PREP: NORMAL

## 2021-11-29 PROCEDURE — 74011000250 HC RX REV CODE- 250: Performed by: EMERGENCY MEDICINE

## 2021-11-29 PROCEDURE — 74011000636 HC RX REV CODE- 636: Performed by: EMERGENCY MEDICINE

## 2021-11-29 PROCEDURE — 74177 CT ABD & PELVIS W/CONTRAST: CPT

## 2021-11-29 PROCEDURE — 74011250636 HC RX REV CODE- 250/636: Performed by: EMERGENCY MEDICINE

## 2021-11-29 PROCEDURE — 96372 THER/PROPH/DIAG INJ SC/IM: CPT

## 2021-11-29 PROCEDURE — 74011250637 HC RX REV CODE- 250/637: Performed by: EMERGENCY MEDICINE

## 2021-11-29 RX ORDER — ONDANSETRON 4 MG/1
4 TABLET, ORALLY DISINTEGRATING ORAL
Qty: 10 TABLET | Refills: 0 | Status: SHIPPED | OUTPATIENT
Start: 2021-11-29

## 2021-11-29 RX ORDER — DICYCLOMINE HYDROCHLORIDE 10 MG/1
10 CAPSULE ORAL
Qty: 20 CAPSULE | Refills: 0 | Status: SHIPPED | OUTPATIENT
Start: 2021-11-29

## 2021-11-29 RX ORDER — AZITHROMYCIN 250 MG/1
1000 TABLET, FILM COATED ORAL
Status: COMPLETED | OUTPATIENT
Start: 2021-11-29 | End: 2021-11-29

## 2021-11-29 RX ORDER — WATER FOR INJECTION,STERILE
VIAL (ML) INJECTION
Status: DISCONTINUED
Start: 2021-11-29 | End: 2021-11-29 | Stop reason: HOSPADM

## 2021-11-29 RX ADMIN — AZITHROMYCIN 1000 MG: 250 TABLET, FILM COATED ORAL at 02:17

## 2021-11-29 RX ADMIN — LIDOCAINE HYDROCHLORIDE 500 MG: 10 INJECTION, SOLUTION EPIDURAL; INFILTRATION; INTRACAUDAL; PERINEURAL at 02:17

## 2021-11-29 RX ADMIN — IOPAMIDOL 100 ML: 755 INJECTION, SOLUTION INTRAVENOUS at 00:22

## 2021-11-29 NOTE — ED NOTES
Discharge paperwork reviewed with patient no questions at this time. Patient is ambulatory at discharge. Her friend is driving her home.

## 2021-11-29 NOTE — ED NOTES
Patient states cc pain lower right abdomen and swelling on upper left abdomen. C/o N/V/D over last 24 hours, chills, epigastric chest pain, and SOB.

## 2021-11-29 NOTE — ED PROVIDER NOTES
EMERGENCY DEPARTMENT HISTORY AND PHYSICAL EXAM      Date: 2021  Patient Name: Lorne Morgan    History of Presenting Illness     Chief Complaint   Patient presents with    Abdominal Pain     Pt arrived to ED from home , states she thinks she is pregnant, complaining of L abd pain. Pt report diarrhea. History Provided By: Patient    HPI: Lorne Morgan, 37 y.o. female with PMHx significant for GERD, sarcoidosis, and previous ectopic pregnancy on right in  presents to the ED with abdominal pain. States that she has had sharp intermittent right lower quadrant abdominal pain for the past week. She also has severe left upper quadrant pain and \"swelling\". Reports 2 episodes of vomiting today with streaks of blood. Reports 3-5 episodes of diarrhea today with no blood. Denies any fever but has had some chills. She reports feeling short of breath and having some lower chest pain. States that her right lower quadrant pain is sharp and 7 out of 10 in intensity. Her left upper quadrant pain is 10 out of 10 and burning and shooting in nature. Pain radiates across her lower abdomen from the right side. Essentially, she has generalized abdominal pain, but it is worse in the right lower quadrant and left upper quadrant she also reports an odor to her urine but denies any dysuria. No exacerbating or relieving factors. She has had some vaginal spotting the last few days. Her last menstrual cycle was on . She reports having a positive home pregnancy test on  and is concerned about a possible ectopic pregnancy. She is G6, . She admits to drinking 5-6 drinks a week and smoking 3 or 4 black and mild cigars daily. Denies any drug use. She does not currently have a PCP or an OB/GYN. PCP: None    No current facility-administered medications on file prior to encounter.      Current Outpatient Medications on File Prior to Encounter   Medication Sig Dispense Refill    budesonide-formoteroL (Symbicort) 160-4.5 mcg/actuation HFAA Take 2 Puffs by inhalation daily as needed.  hydroxychloroquine (Plaquenil) 200 mg tablet Take 200 mg by mouth daily.  Omeprazole delayed release (PRILOSEC D/R) 20 mg tablet Take 20 mg by mouth daily.  ascorbic acid, vitamin C, (Vitamin C) 250 mg tablet Take 250 mg by mouth daily.  echinacea 400 mg cap Take 1 Tab by mouth daily.  albuterol (PROVENTIL VENTOLIN) 2.5 mg /3 mL (0.083 %) nebu 3 mL by Nebulization route every four (4) hours as needed for Cough. 90 Nebule 0    Compressor, For Nebulizer kim 1 Each by Does Not Apply route every four (4) hours as needed (Shortness of breath). 1 Device 0       Past History     Past Medical History:  Past Medical History:   Diagnosis Date    Anemia     GERD (gastroesophageal reflux disease)     Headache     Hypertension     NO MEDICATION    Obesity     PUD (peptic ulcer disease) 2002    resolved    Sarcoidosis of lymph nodes     Stroke ([facility] [address] 2017    patient states TIA-SOME WEAKNESS LEFT EYE AND LEFT HAND       Past Surgical History:  Past Surgical History:   Procedure Laterality Date    HX GYN      Left ovary and fallopian tube removed, D&C, tubal ligation    HX OTHER SURGICAL  2014    Endometrial Ablation, Dr. Gilda Santana HX OTHER SURGICAL  8/2015    lymph node removal    HX OTHER SURGICAL Left 8/27/15    excision of left groin lymph node.     HX TUBAL LIGATION  2005    Dr. Quan Jon       Family History:  Family History   Problem Relation Age of Onset    Hypertension Mother     Cancer Maternal Grandfather         stomach    Hypertension Maternal Grandmother     Diabetes Other         M [employer]    Asthma Neg Hx     Heart Disease Neg Hx        Social History:  Social History     Tobacco Use    Smoking status: Current Every Day Smoker     Packs/day: 0.25     Years: 15.00     Pack years: 3.75    Smokeless tobacco: Never Used    Tobacco comment: SMOKES CIGARS @ PER DAY   Substance Use Topics    Alcohol use: Yes     Comment: occasionally    Drug use: No     Types: Prescription, OTC       Allergies: Allergies   Allergen Reactions    Latex Hives    Motrin [Ibuprofen] Hives     Allergic just to the coating of brand name Motrin, ok to take generic    Tramadol Rash         Review of Systems   Review of Systems   Constitutional: Positive for chills. Negative for fever. HENT: Negative for congestion, ear pain, rhinorrhea and sore throat. Respiratory: Positive for shortness of breath. Negative for cough, chest tightness and wheezing. Cardiovascular: Negative for chest pain and palpitations. Gastrointestinal: Positive for abdominal pain, diarrhea, nausea and vomiting. Genitourinary: Positive for vaginal bleeding. Negative for dysuria, flank pain and hematuria. Musculoskeletal: Negative for back pain, myalgias and neck pain. Skin: Negative for rash and wound. Neurological: Negative for dizziness, syncope, light-headedness and headaches. Psychiatric/Behavioral: Negative for confusion. The patient is nervous/anxious. All other systems reviewed and are negative.         Physical Exam    General appearance - well nourished, well appearing, and in no distress  Eyes - pupils equal and reactive, extraocular eye movements intact  ENT - mucous membranes moist, pharynx normal without lesions  Neck - supple, no significant adenopathy; non-tender to palpation  Chest - clear to auscultation, no wheezes, rales or rhonchi; non-tender to palpation  Heart - normal rate and regular rhythm, S1 and S2 normal, no murmurs noted  Abdomen - soft, generalized abdominal tenderness worse in right lower quadrant and left upper quadrant, no rebound or guarding no masses or organomegaly  Musculoskeletal - no joint tenderness, deformity or swelling; normal ROM  Extremities - peripheral pulses normal, no pedal edema  Skin - normal coloration and turgor, no rashes  Neurological - alert, oriented x3, normal speech, no focal findings or movement disorder noted    Diagnostic Study Results     Labs -     Recent Results (from the past 48 hour(s))   CBC WITH AUTOMATED DIFF    Collection Time: 11/28/21 10:34 PM   Result Value Ref Range    WBC 4.2 3.6 - 11.0 K/uL    RBC 4.30 3.80 - 5.20 M/uL    HGB 13.1 11.5 - 16.0 g/dL    HCT 38.3 35.0 - 47.0 %    MCV 89.1 80.0 - 99.0 FL    MCH 30.5 26.0 - 34.0 PG    MCHC 34.2 30.0 - 36.5 g/dL    RDW 13.2 11.5 - 14.5 %    PLATELET 875 117 - 962 K/uL    MPV 9.5 8.9 - 12.9 FL    NRBC 0.0 0  WBC    ABSOLUTE NRBC 0.00 0.00 - 0.01 K/uL    NEUTROPHILS 55 32 - 75 %    LYMPHOCYTES 27 12 - 49 %    MONOCYTES 13 5 - 13 %    EOSINOPHILS 4 0 - 7 %    BASOPHILS 1 0 - 1 %    IMMATURE GRANULOCYTES 0 0.0 - 0.5 %    ABS. NEUTROPHILS 2.3 1.8 - 8.0 K/UL    ABS. LYMPHOCYTES 1.1 0.8 - 3.5 K/UL    ABS. MONOCYTES 0.6 0.0 - 1.0 K/UL    ABS. EOSINOPHILS 0.2 0.0 - 0.4 K/UL    ABS. BASOPHILS 0.0 0.0 - 0.1 K/UL    ABS. IMM. GRANS. 0.0 0.00 - 0.04 K/UL    DF AUTOMATED     METABOLIC PANEL, COMPREHENSIVE    Collection Time: 11/28/21 10:34 PM   Result Value Ref Range    Sodium 139 136 - 145 mmol/L    Potassium 3.9 3.5 - 5.1 mmol/L    Chloride 110 (H) 97 - 108 mmol/L    CO2 20 (L) 21 - 32 mmol/L    Anion gap 9 5 - 15 mmol/L    Glucose 117 (H) 65 - 100 mg/dL    BUN 8 6 - 20 MG/DL    Creatinine 0.78 0.55 - 1.02 MG/DL    BUN/Creatinine ratio 10 (L) 12 - 20      GFR est AA >60 >60 ml/min/1.73m2    GFR est non-AA >60 >60 ml/min/1.73m2    Calcium 8.8 8.5 - 10.1 MG/DL    Bilirubin, total 0.4 0.2 - 1.0 MG/DL    ALT (SGPT) 91 (H) 12 - 78 U/L    AST (SGOT) 98 (H) 15 - 37 U/L    Alk.  phosphatase 66 45 - 117 U/L    Protein, total 8.8 (H) 6.4 - 8.2 g/dL    Albumin 4.0 3.5 - 5.0 g/dL    Globulin 4.8 (H) 2.0 - 4.0 g/dL    A-G Ratio 0.8 (L) 1.1 - 2.2     BETA HCG, QT    Collection Time: 11/28/21 10:34 PM   Result Value Ref Range    Beta HCG, QT <1 0 - 6 MIU/ML   LIPASE    Collection Time: 11/28/21 10:34 PM   Result Value Ref Range    Lipase 186 73 - 393 U/L   URINALYSIS W/ REFLEX CULTURE    Collection Time: 11/28/21 11:07 PM    Specimen: Urine   Result Value Ref Range    Color YELLOW/STRAW      Appearance CLEAR CLEAR      Specific gravity 1.013 1.003 - 1.030      pH (UA) 5.0 5.0 - 8.0      Protein Negative NEG mg/dL    Glucose Negative NEG mg/dL    Ketone Negative NEG mg/dL    Bilirubin Negative NEG      Blood Negative NEG      Urobilinogen 1.0 0.2 - 1.0 EU/dL    Nitrites Negative NEG      Leukocyte Esterase Negative NEG      WBC 0-4 0 - 4 /hpf    RBC 0-5 0 - 5 /hpf    Epithelial cells FEW FEW /lpf    Bacteria Negative NEG /hpf    UA:UC IF INDICATED CULTURE NOT INDICATED BY UA RESULT CNI      Hyaline cast 0-2 0 - 5 /lpf   TORY, OTHER SOURCES    Collection Time: 11/28/21 11:39 PM    Specimen: Cervix; Other   Result Value Ref Range    Special Requests: NO SPECIAL REQUESTS      KOH NO YEAST SEEN     WET PREP    Collection Time: 11/28/21 11:39 PM    Specimen: Miscellaneous sample   Result Value Ref Range    Clue cells CLUE CELLS ABSENT      Wet prep NO TRICHOMONAS SEEN     HCG URINE, QL. - POC    Collection Time: 11/28/21 11:53 PM   Result Value Ref Range    Pregnancy test,urine (POC) Negative NEG         Radiologic Studies -   No orders to display     CT Results  (Last 48 hours)    None        CXR Results  (Last 48 hours)    None            Medical Decision Making   I am the first provider for this patient. I reviewed the vital signs, available nursing notes, past medical history, past surgical history, family history and social history. Vital Signs-Reviewed the patient's vital signs. Records Reviewed: Nursing Notes and Old Medical Records    Provider Notes (Medical Decision Making):   Differential diagnosis: UTI, kidney stone, appendicitis, ectopic pregnancy, pancreatitis  We will check CBC, CMP, UA, beta hCG, lipase, and will consider pelvic ultrasound if beta is positive.     ED Course:   Initial assessment performed. The patients presenting problems have been discussed, and they are in agreement with the care plan formulated and outlined with them. I have encouraged them to ask questions as they arise throughout their visit. Progress Notes:  ED Course as of 11/29/21 2340   Mon Nov 29, 2021   0127 Patient sleeping when I went to reevaluate her. Lab work shows slightly low CO2 at 20. Mildly elevated LFTs. White count is normal.  UA is unremarkable. Patient is requesting treatment for possible STD. Will treat with Rocephin and Zithromax in the ED. Abdominal pelvis CT is unremarkable. [AO]      ED Course User Index  [AO] Mimi Nelson MD       Disposition:  Discharge home    PLAN:  1. Discharge Medication List as of 11/29/2021  1:31 AM      START taking these medications    Details   dicyclomine (BENTYL) 10 mg capsule Take 1 Capsule by mouth four (4) times daily as needed for Abdominal Cramps., Normal, Disp-20 Capsule, R-0      ondansetron (Zofran ODT) 4 mg disintegrating tablet Take 1 Tablet by mouth every eight (8) hours as needed for Nausea., Normal, Disp-10 Tablet, R-0         CONTINUE these medications which have NOT CHANGED    Details   budesonide-formoteroL (Symbicort) 160-4.5 mcg/actuation HFAA Take 2 Puffs by inhalation daily as needed., Historical Med      hydroxychloroquine (Plaquenil) 200 mg tablet Take 200 mg by mouth daily. , Historical Med      Omeprazole delayed release (PRILOSEC D/R) 20 mg tablet Take 20 mg by mouth daily. , Historical Med      ascorbic acid, vitamin C, (Vitamin C) 250 mg tablet Take 250 mg by mouth daily. , Historical Med      echinacea 400 mg cap Take 1 Tab by mouth daily. , Historical Med      albuterol (PROVENTIL VENTOLIN) 2.5 mg /3 mL (0.083 %) nebu 3 mL by Nebulization route every four (4) hours as needed for Cough. , Print, Disp-90 Nebule, R-0      Compressor, For Nebulizer kim 1 Each by Does Not Apply route every four (4) hours as needed (Shortness of breath). , Print, Disp-1 Device, R-0           2. Follow-up Information     Follow up With Specialties Details Why Contact Info    [facility] EMERGENCY DEPT Emergency Medicine  If symptoms worsen [address] [facility] [address]    Vignesh Steele MD Obstetrics & Gynecology, Gynecology, Obstetrics Schedule an appointment as soon as possible for a visit   [address] [phone]          Return to ED if worse     Diagnosis     Clinical Impression:   1. Abdominal pain, right lower quadrant    2. Abdominal pain, LUQ (left upper quadrant)    3.  Concern about STD in female without diagnosis

## 2021-11-30 LAB
C TRACH RRNA SPEC QL NAA+PROBE: NEGATIVE
N GONORRHOEA RRNA SPEC QL NAA+PROBE: NEGATIVE
SPECIMEN SOURCE: NORMAL

## 2022-03-18 PROBLEM — G45.1 HEMISPHERIC CAROTID ARTERY SYNDROME: Status: ACTIVE | Noted: 2017-05-13

## 2022-03-19 PROBLEM — N87.9 CERVICAL DYSPLASIA: Status: ACTIVE | Noted: 2020-04-20

## 2022-04-30 ENCOUNTER — HOSPITAL ENCOUNTER (EMERGENCY)
Age: 42
Discharge: HOME | End: 2022-04-30
Attending: EMERGENCY MEDICINE
Payer: MEDICAID

## 2022-04-30 ENCOUNTER — APPOINTMENT (OUTPATIENT)
Dept: GENERAL RADIOLOGY | Age: 42
End: 2022-04-30
Attending: EMERGENCY MEDICINE
Payer: MEDICAID

## 2022-04-30 VITALS
BODY MASS INDEX: 28.76 KG/M2 | DIASTOLIC BLOOD PRESSURE: 103 MMHG | RESPIRATION RATE: 16 BRPM | OXYGEN SATURATION: 100 % | WEIGHT: 156.31 LBS | SYSTOLIC BLOOD PRESSURE: 161 MMHG | TEMPERATURE: 98.1 F | HEART RATE: 80 BPM | HEIGHT: 62 IN

## 2022-04-30 DIAGNOSIS — J06.9 UPPER RESPIRATORY INFECTION, VIRAL: Primary | ICD-10-CM

## 2022-04-30 DIAGNOSIS — J45.21 MILD INTERMITTENT ASTHMA WITH ACUTE EXACERBATION: ICD-10-CM

## 2022-04-30 LAB
FLUAV AG NPH QL IA: NEGATIVE
FLUBV AG NOSE QL IA: NEGATIVE

## 2022-04-30 PROCEDURE — U0005 INFEC AGEN DETEC AMPLI PROBE: HCPCS

## 2022-04-30 PROCEDURE — 94640 AIRWAY INHALATION TREATMENT: CPT

## 2022-04-30 PROCEDURE — 99284 EMERGENCY DEPT VISIT MOD MDM: CPT

## 2022-04-30 PROCEDURE — 71046 X-RAY EXAM CHEST 2 VIEWS: CPT

## 2022-04-30 PROCEDURE — 74011000250 HC RX REV CODE- 250: Performed by: EMERGENCY MEDICINE

## 2022-04-30 PROCEDURE — 87804 INFLUENZA ASSAY W/OPTIC: CPT

## 2022-04-30 RX ORDER — ALBUTEROL SULFATE 90 UG/1
2 AEROSOL, METERED RESPIRATORY (INHALATION)
Qty: 1 EACH | Refills: 0 | Status: SHIPPED | OUTPATIENT
Start: 2022-04-30

## 2022-04-30 RX ORDER — LIDOCAINE HYDROCHLORIDE 20 MG/ML
15 SOLUTION OROPHARYNGEAL
Status: COMPLETED | OUTPATIENT
Start: 2022-04-30 | End: 2022-04-30

## 2022-04-30 RX ORDER — IPRATROPIUM BROMIDE AND ALBUTEROL SULFATE 2.5; .5 MG/3ML; MG/3ML
3 SOLUTION RESPIRATORY (INHALATION)
Status: COMPLETED | OUTPATIENT
Start: 2022-04-30 | End: 2022-04-30

## 2022-04-30 RX ORDER — PREDNISONE 10 MG/1
TABLET ORAL
Qty: 21 TABLET | Refills: 0 | Status: SHIPPED | OUTPATIENT
Start: 2022-04-30

## 2022-04-30 RX ADMIN — LIDOCAINE HYDROCHLORIDE 15 ML: 20 SOLUTION ORAL at 13:47

## 2022-04-30 RX ADMIN — IPRATROPIUM BROMIDE AND ALBUTEROL SULFATE 3 ML: .5; 3 SOLUTION RESPIRATORY (INHALATION) at 14:56

## 2022-04-30 NOTE — ED PROVIDER NOTES
EMERGENCY DEPARTMENT HISTORY AND PHYSICAL EXAM      Date: 4/30/2022  Patient Name: Lorenzo Mulligan    History of Presenting Illness     Chief Complaint   Patient presents with    Cough     Sx started last Friday after an asthma attack. pt developed cough and cold sx as well as midepigastric/midsternal chest pain, worse when coughing. No vomiting or diarrhea. no fever    Nasal Congestion    Abdominal Pain       History Provided By: Patient    HPI: Lorenzo Mulligan, 37 y.o. female  presents to the ED with cc of nasal congestion sore throat cough and rib pain. Patient states that she has been sick with these cold symptoms for about a week. Other family members including her  and son are sick with similar symptoms. Patient does have a history of asthma. She has been using her Symbicort as a rescue inhaler. No fevers or chills. She initially thought it was allergies and she had been outdoors in a park. She states she has cough with greenish mucus. Also has a history of sarcoid. States normally with the symptoms she will need a steroid Dosepak. Past History     Past Medical History:  Past Medical History:   Diagnosis Date    Anemia     GERD (gastroesophageal reflux disease)     Headache     Hypertension     NO MEDICATION    Obesity     PUD (peptic ulcer disease) 2002    resolved    Sarcoidosis of lymph nodes     Stroke ([facility] [address] 2017    patient states TIA-SOME WEAKNESS LEFT EYE AND LEFT HAND       Past Surgical History:  Past Surgical History:   Procedure Laterality Date    HX GYN      Left ovary and fallopian tube removed, D&C, tubal ligation    HX OTHER SURGICAL  2014    Endometrial Ablation, Dr. Neha Guevara HX OTHER SURGICAL  8/2015    lymph node removal    HX OTHER SURGICAL Left 8/27/15    excision of left groin lymph node.  HX TUBAL LIGATION  2005    Dr. Daniela Jon Gilmore       Medications:  No current facility-administered medications on file prior to encounter. Current Outpatient Medications on File Prior to Encounter   Medication Sig Dispense Refill    dicyclomine (BENTYL) 10 mg capsule Take 1 Capsule by mouth four (4) times daily as needed for Abdominal Cramps. 20 Capsule 0    ondansetron (Zofran ODT) 4 mg disintegrating tablet Take 1 Tablet by mouth every eight (8) hours as needed for Nausea. 10 Tablet 0    budesonide-formoteroL (Symbicort) 160-4.5 mcg/actuation HFAA Take 2 Puffs by inhalation daily as needed.  hydroxychloroquine (Plaquenil) 200 mg tablet Take 200 mg by mouth daily.  Omeprazole delayed release (PRILOSEC D/R) 20 mg tablet Take 20 mg by mouth daily.  ascorbic acid, vitamin C, (Vitamin C) 250 mg tablet Take 250 mg by mouth daily.  echinacea 400 mg cap Take 1 Tab by mouth daily.  albuterol (PROVENTIL VENTOLIN) 2.5 mg /3 mL (0.083 %) nebu 3 mL by Nebulization route every four (4) hours as needed for Cough. 90 Nebule 0    Compressor, For Nebulizer kim 1 Each by Does Not Apply route every four (4) hours as needed (Shortness of breath). 1 Device 0       Family History:  Family History   Problem Relation Age of Onset    Hypertension Mother     Cancer Maternal Grandfather         stomach    Hypertension Maternal Grandmother     Diabetes Other         M [employer]    Asthma Neg Hx     Heart Disease Neg Hx        Social History:  Social History     Tobacco Use    Smoking status: Current Every Day Smoker     Packs/day: 0.25     Years: 15.00     Pack years: 3.75    Smokeless tobacco: Never Used    Tobacco comment: SMOKES CIGARS @ PER DAY   Substance Use Topics    Alcohol use: Yes     Comment: occasionally    Drug use: No     Types: Prescription, OTC       Allergies:   Allergies   Allergen Reactions    Latex Hives    Motrin [Ibuprofen] Hives     Allergic just to the coating of brand name Motrin, ok to take generic    Tramadol Rash       All the above components of the past  history are auto-populated from the electronic record. They have been reviewed and the patient has been interviewed for any pertinent past history that pertains to the patient's chief complaint and reason for visit. Not all pre-populated components may be accurate at the time this note was generated. Review of Systems   Review of Systems   Constitutional: Negative for chills and fever. HENT: Positive for congestion, ear pain, rhinorrhea and sore throat. Negative for trouble swallowing. Eyes: Negative for visual disturbance. Respiratory: Positive for cough, chest tightness, shortness of breath and wheezing. Cardiovascular: Negative for chest pain and palpitations. Gastrointestinal: Negative for abdominal pain, blood in stool, constipation, diarrhea, nausea and vomiting. Genitourinary: Negative for decreased urine volume, difficulty urinating, dysuria and frequency. Musculoskeletal: Negative for back pain and neck pain. Skin: Negative for color change and rash. Neurological: Negative for dizziness, weakness, light-headedness and headaches. Physical Exam   Physical Exam  Vitals and nursing note reviewed. Constitutional:       General: She is not in acute distress. Appearance: She is well-developed. She is not ill-appearing. HENT:      Head: Normocephalic. Right Ear: Ear canal normal. A middle ear effusion is present. Left Ear: Ear canal normal. A middle ear effusion is present. Ears:      Comments: Bilateral serous effusions  Eyes:      Conjunctiva/sclera: Conjunctivae normal.   Cardiovascular:      Rate and Rhythm: Normal rate and regular rhythm. Pulmonary:      Effort: Pulmonary effort is normal. No accessory muscle usage or respiratory distress. Abdominal:      General: There is no distension. Musculoskeletal:      Cervical back: Normal range of motion. Skin:     General: Skin is warm and dry. Neurological:      Mental Status: She is alert and oriented to person, place, and time. Diagnostic Study Results     Labs -   No results found for this or any previous visit (from the past 24 hour(s)). Radiologic Studies -   XR CHEST PA LAT   Final Result   1. No acute cardiopulmonary disease           CT Results  (Last 48 hours)    None        CXR Results  (Last 48 hours)               04/30/22 1402  XR CHEST PA LAT Final result    Impression:  1. No acute cardiopulmonary disease           Narrative:  INDICATION:  cough, chest pain        Exam: Chest 2 views. Comparison: 10/24/2019. Findings: Cardiomediastinal silhouette is normal. Pulmonary vasculature is not   engorged. No focal parenchymal opacities, effusions, or pneumothorax. Bony   thorax is intact. Medical Decision Making     I reviewed the vital signs, available nursing notes, past medical history, past surgical history, family history and social history. Vital Signs-I have reviewed the vital signs that have been made available during the patient's emergency department visit. The vital signs auto-populated below are obtained mostly by electronic means through monitoring devices that have been downloaded into the patient's chart by the nursing staff. Some vital signs are not downloaded into the chart until after the patient has been discharged and this note has been completed, therefore some vital signs may not be available to the physician for review prior to patient's discharge or admission. The physician has reviewed the patient's triage vital signs, monitored the electronic monitoring devices remotely for any significant vital sign abnormalities, and have reviewed vital signs prior to discharge. Some vital signs reviewed at bedside or remotely utilizing electronic monitoring devices may be different than the vital signs downloaded into the electronic medical record.   Some vital signs may be erroneous and inaccurate since they are obtained by electronic monitoring devices, and not all vital signs are verified for accuracy by nursing staff prior to downloading into the patient's chart. Patient Vitals for the past 24 hrs:   Temp Pulse Resp BP SpO2   04/30/22 1302 98.1 °F (36.7 °C) 80 16 (!) 161/103 100 %         Records Reviewed: Nursing notes for today's visit have been reviewed. I have also reviewed most recent medical records pertinent to today's complaints, if available in our medical record system. I have also reviewed all labs and imaging results from previous results in comparison to results obtained today. If an EKG was obtained today, it has been compared to previous EKGs, if available. If arriving via EMS, the EMS report has been reviewed if made available to us within the patient's time in the emergency department. Provider Notes (Medical Decision Making):   Patient presents with URI symptoms in which family members have had similar symptoms. Vital signs are within acceptable ranges. We will test for influenza and COVID. Recommend a nebulizer here in the ED. Will prescribe a rescue inhaler and start her on a steroid Dosepak. Imaging is negative for pneumonia. ED Course:   Initial assessment performed. The patients presenting problems have been discussed, and they are in agreement with the care plan formulated and outlined with them. I have encouraged them to ask questions as they arise throughout their visit. Orders Placed This Encounter    XR CHEST PA LAT    INFLUENZA A+B VIRAL AGS    SARS-COV-2    lidocaine (XYLOCAINE) 2 % viscous solution 15 mL    albuterol-ipratropium (DUO-NEB) 2.5 MG-0.5 MG/3 ML    albuterol (PROVENTIL HFA, VENTOLIN HFA, PROAIR HFA) 90 mcg/actuation inhaler    predniSONE (STERAPRED DS) 10 mg dose pack       Procedures      Critical Care Time:   0    Disposition:  Discharge    The patient's emergency department evaluation is now complete. I have reviewed all labs, imaging, and pertinent information.   I have discussed all results with the patient and/or family. Based on our evaluation today I do believe that the patient is safe to be discharged home. The patient has been provided with at home instructions that are pertinent to their complaint today, although these may not be specific to the exact diagnosis. I have reviewed the patient's home medications and attempted to reconcile if not already done so by pharmacy or nursing staff. I have discussed all new prescriptions with the patient. The patient has been encouraged to follow-up with primary care doctor and/or specialist, and these have been discussed with the patient. The patient has been advised that they may return to the emergency department if they have any worsening symptoms and or new symptoms that are of concern to them. Verbal discharge instructions may have also been provided to the patient that may not be specifically contained in the written discharge instructions. The patient has been given opportunity to ask questions prior to discharge. PLAN:  1. Current Discharge Medication List      START taking these medications    Details   albuterol (PROVENTIL HFA, VENTOLIN HFA, PROAIR HFA) 90 mcg/actuation inhaler Take 2 Puffs by inhalation every four (4) hours as needed for Wheezing or Cough. Qty: 1 Each, Refills: 0  Start date: 4/30/2022      predniSONE (STERAPRED DS) 10 mg dose pack 6-day Dosepak use as directed  Qty: 21 Tablet, Refills: 0  Start date: 4/30/2022         CONTINUE these medications which have NOT CHANGED    Details   albuterol (PROVENTIL VENTOLIN) 2.5 mg /3 mL (0.083 %) nebu 3 mL by Nebulization route every four (4) hours as needed for Cough. Qty: 90 Nebule, Refills: 0           2. Follow-up Information     Follow up With Specialties Details Why Contact Info    [facility] EMERGENCY DEPT Emergency Medicine Go to  If symptoms worsen [address] [place] [address]  [phone]        Return to ED if worse     Diagnosis     Clinical Impression:   1. Upper respiratory infection, viral    2.  Mild intermittent asthma with acute exacerbation

## 2022-04-30 NOTE — DISCHARGE INSTRUCTIONS
It was a pleasure taking care of you in our Emergency Department today. We know that when you come to [facility], you are entrusting us with your health, comfort, and safety. Our physicians and nurses honor that trust, and truly appreciate the opportunity to care for you and your loved ones. We also value your feedback. If you receive a survey about your Emergency Department experience today, please fill it out. We care about our patients' feedback, and we listen to what you have to say. Please read over your discharge instructions as these contain pertinent information to help you in the healing process. These instructions include a list of prescriptions you were given today. Follow-up information is also noted on your discharge papers. There are attached instructions and information pertaining to the reason why you were seen in the emergency department today. These discharge instructions may not be for exactly why you were here, but may be the closest available instructions that we have. These include important advice for things that you can do at home to feel better, and reasons to return to the emergency department. The evaluation and treatment you received in the emergency department is not always definitive care. If follow-up with your primary care doctor or specialist was recommended, it is important that you make these appointments for follow-up care. You may need further testing, procedures, and/or medications to help you feel better. Further tests may be required that are not available in the emergency department. Failure to make these follow-up appointments may jeopardize your health. The emergency department is here for emergent stabilization and evaluation of life and limb threatening illness and/or injuries.   Further care through a specialist or primary care doctor may be required to assist in your healing and complete your treatment and/or evaluation. We may not always be able to make a diagnosis in the emergency department, or things may change that will alter your diagnosis. Our primary goal is to ensure that nothing serious is occurring and that you are stable to continue your treatment and evaluation at home as an outpatient. Of course, if things change, and you feel worse, you are always encouraged to return to the emergency department for re-evaluation. Lab Results Today:  No results found for this or any previous visit (from the past 8 hour(s)). Radiology Results Today:  XR CHEST PA LAT    Result Date: 4/30/2022  1.  No acute cardiopulmonary disease

## 2022-05-01 LAB
SARS-COV-2, XPLCVT: NOT DETECTED
SOURCE, COVRS: NORMAL

## 2022-10-04 ENCOUNTER — HOSPITAL ENCOUNTER (EMERGENCY)
Age: 42
Discharge: LEFT BEFORE DISCHARGE | End: 2022-10-04
Attending: STUDENT IN AN ORGANIZED HEALTH CARE EDUCATION/TRAINING PROGRAM

## 2023-02-19 NOTE — PROGRESS NOTES
HPI     Chief Complaint   Patient presents with    Establish Care    Skin Problem     Right foot    Foot Pain     Fracture with swelling was seen an given a boot on 02/10/23. She is a 40 y.o. female who presents to [facility] care. Establishing Care    Pmhx : HTN, Anemia, GERD, Hx PUD, Sarcoidosis     HTN, has been off medication for quite some time. TIA at 29 yo. Hx sarcoidosis, dx in 2015. She does not believe she truly had/has sarcoidosis. Chronic nasal congestion at night. Has seen ENT for this. Asthma, symptoms recently controlled. Using albuterol prn. Previously followed by pulm. C/o chronic skin lesions, scattered, raised lesions. Onset > 1 year ago. Has tried may therapies, including steroids, antifungals. Raised lesions on tattooed areas of skin. She reports hx of skin biopsy by derm, unknown details. Follows with Gyn and podiatry. - Chronic medical problems:  Past Medical History:   Diagnosis Date    Anemia     GERD (gastroesophageal reflux disease)     Headache     Hypertension     NO MEDICATION    Obesity     PUD (peptic ulcer disease) 2002    resolved    Sarcoidosis of lymph nodes     Stroke ([facility] [address] 2017    patient states TIA-SOME WEAKNESS LEFT EYE AND LEFT HAND     - Current medications:   Current Outpatient Medications   Medication Sig    fluticasone (Flonase Sensimist) 27.5 mcg/actuation nasal spray 2 Sprays by Nasal route daily. albuterol (PROVENTIL HFA, VENTOLIN HFA, PROAIR HFA) 90 mcg/actuation inhaler Take 2 Puffs by inhalation every four (4) hours as needed for Wheezing or Cough. budesonide-formoteroL (SYMBICORT) 160-4.5 mcg/actuation HFAA Take 2 Puffs by inhalation daily as needed. ascorbic acid, vitamin C, (VITAMIN C) 250 mg tablet Take 250 mg by mouth daily. No current facility-administered medications for this visit.      - Family history:   Family History   Problem Relation Age of Onset    Hypertension Mother     Cancer Maternal Grandfather stomach    Hypertension Maternal Grandmother     Diabetes Other         M [employer]    Asthma Neg Hx     Heart Disease Neg Hx      - Allergies: Allergies   Allergen Reactions    Latex Hives    Motrin [Ibuprofen] Hives     Allergic just to the coating of brand name Motrin, ok to take generic    Tramadol Rash     - Surgical history:   Past Surgical History:   Procedure Laterality Date    HX GYN      Left ovary and fallopian tube removed, D&C, tubal ligation    HX OTHER SURGICAL  2014    Endometrial Ablation, Dr. Edward Johnson     HX OTHER SURGICAL  8/2015    lymph node removal    HX OTHER SURGICAL Left 8/27/15    excision of left groin lymph node. HX TUBAL LIGATION  2005    Dr. Jing Jon Primer     - Social history (sexually active, occupation, smoker, etoh use, etc):   Social History     Socioeconomic History    Marital status: LEGALLY      Spouse name: Not on file    Number of children: Not on file    Years of education: Not on file    Highest education level: Not on file   Occupational History    Not on file   Tobacco Use    Smoking status: Every Day     Packs/day: 0.25     Years: 15.00     Pack years: 3.75     Types: Cigarettes    Smokeless tobacco: Never    Tobacco comments:     SMOKES CIGARS @ PER DAY   Substance and Sexual Activity    Alcohol use: Yes     Comment: occasionally    Drug use: No     Types: Prescription, OTC    Sexual activity: Not on file   Other Topics Concern    Not on file   Social History Narrative    Not on file     Social Determinants of Health     Financial Resource Strain: Not on file   Food Insecurity: Not on file   Transportation Needs: Not on file   Physical Activity: Not on file   Stress: Not on file   Social Connections: Not on file   Intimate Partner Violence: Not on file   Housing Stability: Not on file         Review of Systems  General : Denies fever, chills, unintentional weight loss.   Cardiac : Denies chest pain, shortness of breath, orthopnea, PND.  Pulm : Denies coughing, hemoptysis, dyspnea. GI: Denies abd pain, vomiting, change in bowel movements, black/bloody stool. Neuro : Denies syncope or presyncope. Denies focal neuro symptoms. Reviewed PmHx, RxHx, FmHx, SocHx, AllgHx and updated and dated in the chart. Physical Exam:  Visit Vitals  /89 (BP 1 Location: Right arm, BP Patient Position: Sitting, BP Cuff Size: Small adult)   Pulse 87   Temp 97.3 °F (36.3 °C) (Temporal)   Resp 18   Ht 5' 2\" (1.575 m)   Wt 158 lb 9.6 oz (71.9 kg)   SpO2 100%   BMI 29.01 kg/m²     Physical Exam  Vitals reviewed. Constitutional:       Appearance: Normal appearance. HENT:      Head: Normocephalic and atraumatic. Eyes:      Extraocular Movements: Extraocular movements intact. Conjunctiva/sclera: Conjunctivae normal.      Pupils: Pupils are equal, round, and reactive to light. Cardiovascular:      Rate and Rhythm: Normal rate and regular rhythm. Pulses: Normal pulses. Heart sounds: Normal heart sounds. Pulmonary:      Effort: Pulmonary effort is normal.      Breath sounds: Normal breath sounds. Skin:     General: Skin is warm and dry. Comments: Patch of scaly pustular lesions on sole of right foot, nontender. Scattered raised erythematous and some small shallow ulcerative lesions on mutliple tattooed areas  of skin. Neurological:      General: No focal deficit present. Mental Status: She is alert and oriented to person, place, and time. Assessment / Plan     Diagnoses and all orders for this visit:    1. Pain in other joint  -     TSH 3RD GENERATION; Future  -     NEY, DIRECT, W/REFLEX; Future  -     CBC WITH AUTOMATED DIFF; Future  -     METABOLIC PANEL, COMPREHENSIVE; Future    2. Skin rash  -     TSH 3RD GENERATION; Future  -     NEY, DIRECT, W/REFLEX; Future  -     CBC WITH AUTOMATED DIFF; Future  -     METABOLIC PANEL, COMPREHENSIVE; Future  -     SED RATE (ESR); Future    3.  Fatigue, unspecified type  -     TSH 3RD GENERATION; Future  -     VITAMIN D, 25 HYDROXY; Future  -     CBC WITH AUTOMATED DIFF; Future  -     METABOLIC PANEL, COMPREHENSIVE; Future  -     SED RATE (ESR); Future    4. Sarcoid  -     VITAMIN D, 25 HYDROXY; Future  -     NEY, DIRECT, W/REFLEX; Future  -     CBC WITH AUTOMATED DIFF; Future  -     METABOLIC PANEL, COMPREHENSIVE; Future  -     REFERRAL TO RHEUMATOLOGY  -     REFERRAL TO DERMATOLOGY  -     SED RATE (ESR); Future         Obtain medical records  Follow up based on results and clinical course   I have discussed the diagnosis with the patient and the intended plan as seen in the above orders.         Rafal Gomez, DO

## 2023-02-20 ENCOUNTER — OFFICE VISIT (OUTPATIENT)
Dept: PRIMARY CARE CLINIC | Age: 43
End: 2023-02-20
Payer: COMMERCIAL

## 2023-02-20 VITALS
HEART RATE: 87 BPM | DIASTOLIC BLOOD PRESSURE: 89 MMHG | SYSTOLIC BLOOD PRESSURE: 130 MMHG | OXYGEN SATURATION: 100 % | WEIGHT: 158.6 LBS | TEMPERATURE: 97.3 F | RESPIRATION RATE: 18 BRPM | BODY MASS INDEX: 29.19 KG/M2 | HEIGHT: 62 IN

## 2023-02-20 DIAGNOSIS — M25.59 PAIN IN OTHER JOINT: ICD-10-CM

## 2023-02-20 DIAGNOSIS — D86.9 SARCOID: ICD-10-CM

## 2023-02-20 DIAGNOSIS — E55.9 VITAMIN D DEFICIENCY: ICD-10-CM

## 2023-02-20 DIAGNOSIS — R21 SKIN RASH: ICD-10-CM

## 2023-02-20 DIAGNOSIS — R53.83 FATIGUE, UNSPECIFIED TYPE: ICD-10-CM

## 2023-02-20 DIAGNOSIS — D72.819 LEUKOPENIA, UNSPECIFIED TYPE: Primary | ICD-10-CM

## 2023-02-20 DIAGNOSIS — M25.59 PAIN IN OTHER JOINT: Primary | ICD-10-CM

## 2023-02-20 LAB
25(OH)D3 SERPL-MCNC: <9 NG/ML (ref 30–100)
ALBUMIN SERPL-MCNC: 3.7 G/DL (ref 3.5–5)
ALBUMIN/GLOB SERPL: 0.9 (ref 1.1–2.2)
ALP SERPL-CCNC: 66 U/L (ref 45–117)
ALT SERPL-CCNC: 40 U/L (ref 12–78)
ANION GAP SERPL CALC-SCNC: 5 MMOL/L (ref 5–15)
AST SERPL-CCNC: 33 U/L (ref 15–37)
BASOPHILS # BLD: 0 K/UL (ref 0–0.1)
BASOPHILS NFR BLD: 1 % (ref 0–1)
BILIRUB SERPL-MCNC: 0.3 MG/DL (ref 0.2–1)
BUN SERPL-MCNC: 10 MG/DL (ref 6–20)
BUN/CREAT SERPL: 15 (ref 12–20)
CALCIUM SERPL-MCNC: 9.5 MG/DL (ref 8.5–10.1)
CHLORIDE SERPL-SCNC: 104 MMOL/L (ref 97–108)
CO2 SERPL-SCNC: 27 MMOL/L (ref 21–32)
CREAT SERPL-MCNC: 0.65 MG/DL (ref 0.55–1.02)
DIFFERENTIAL METHOD BLD: ABNORMAL
EOSINOPHIL # BLD: 0.2 K/UL (ref 0–0.4)
EOSINOPHIL NFR BLD: 6 % (ref 0–7)
ERYTHROCYTE [DISTWIDTH] IN BLOOD BY AUTOMATED COUNT: 12.3 % (ref 11.5–14.5)
ERYTHROCYTE [SEDIMENTATION RATE] IN BLOOD: 27 MM/HR (ref 0–20)
GLOBULIN SER CALC-MCNC: 4 G/DL (ref 2–4)
GLUCOSE SERPL-MCNC: 98 MG/DL (ref 65–100)
HCT VFR BLD AUTO: 37 % (ref 35–47)
HGB BLD-MCNC: 11.9 G/DL (ref 11.5–16)
IMM GRANULOCYTES # BLD AUTO: 0 K/UL (ref 0–0.04)
IMM GRANULOCYTES NFR BLD AUTO: 0 % (ref 0–0.5)
LYMPHOCYTES # BLD: 0.4 K/UL (ref 0.8–3.5)
LYMPHOCYTES NFR BLD: 12 % (ref 12–49)
MCH RBC QN AUTO: 29.1 PG (ref 26–34)
MCHC RBC AUTO-ENTMCNC: 32.2 G/DL (ref 30–36.5)
MCV RBC AUTO: 90.5 FL (ref 80–99)
MONOCYTES # BLD: 0.3 K/UL (ref 0–1)
MONOCYTES NFR BLD: 9 % (ref 5–13)
NEUTS SEG # BLD: 2.4 K/UL (ref 1.8–8)
NEUTS SEG NFR BLD: 72 % (ref 32–75)
NRBC # BLD: 0 K/UL (ref 0–0.01)
NRBC BLD-RTO: 0 PER 100 WBC
PLATELET # BLD AUTO: 258 K/UL (ref 150–400)
PMV BLD AUTO: 9.9 FL (ref 8.9–12.9)
POTASSIUM SERPL-SCNC: 4 MMOL/L (ref 3.5–5.1)
PROT SERPL-MCNC: 7.7 G/DL (ref 6.4–8.2)
RBC # BLD AUTO: 4.09 M/UL (ref 3.8–5.2)
RBC MORPH BLD: ABNORMAL
SODIUM SERPL-SCNC: 136 MMOL/L (ref 136–145)
TSH SERPL DL<=0.05 MIU/L-ACNC: 0.72 UIU/ML (ref 0.36–3.74)
WBC # BLD AUTO: 3.3 K/UL (ref 3.6–11)

## 2023-02-20 PROCEDURE — 99204 OFFICE O/P NEW MOD 45 MIN: CPT | Performed by: FAMILY MEDICINE

## 2023-02-20 RX ORDER — FLUTICASONE FUROATE 27.5 UG/1
2 SPRAY, METERED NASAL DAILY
COMMUNITY

## 2023-02-20 NOTE — PROGRESS NOTES
Chief Complaint   Patient presents with    Establish Care    Skin Problem     Right foot    Foot Pain     Fracture with swelling was seen an given a boot on 02/10/23. Visit Vitals  /89 (BP 1 Location: Right arm, BP Patient Position: Sitting, BP Cuff Size: Small adult)   Pulse 87   Temp 97.3 °F (36.3 °C) (Temporal)   Resp 18   Ht 5' 2\" (1.575 m)   Wt 158 lb 9.6 oz (71.9 kg)   SpO2 100%   BMI 29.01 kg/m²     1. \"Have you been to the ER, urgent care clinic since your last visit? Hospitalized since your last visit? \" no    2. \"Have you seen or consulted any other health care providers outside of the [address] [place] [address] since your last visit? \" Foot and ankle           If the patient is female:    4. For patients aged 41-77: Has the patient had a mammogram within the past 2 years? unknown      5. For patients aged 21-65: Has the patient had a pap smear?  [id number]

## 2023-02-21 LAB — ANA SER QL: NEGATIVE

## 2023-02-22 RX ORDER — ERGOCALCIFEROL 1.25 MG/1
50000 CAPSULE ORAL
Qty: 8 CAPSULE | Refills: 0 | Status: SHIPPED | OUTPATIENT
Start: 2023-02-22

## 2023-08-03 ENCOUNTER — HOSPITAL ENCOUNTER (OUTPATIENT)
Facility: HOSPITAL | Age: 43
Discharge: HOME | End: 2023-08-03
Attending: PAIN MEDICINE

## 2023-08-03 ENCOUNTER — HOSPITAL ENCOUNTER (OUTPATIENT)
Facility: HOSPITAL | Age: 43
End: 2023-08-03
Attending: PAIN MEDICINE

## 2023-08-03 DIAGNOSIS — M79.18 DIFFUSE MYOFASCIAL PAIN SYNDROME: ICD-10-CM

## 2023-08-03 DIAGNOSIS — M54.12 CERVICAL RADICULOPATHY: ICD-10-CM

## 2023-08-03 DIAGNOSIS — M47.816 LUMBAR SPONDYLOSIS: ICD-10-CM

## 2023-08-03 DIAGNOSIS — M25.559 PAIN IN JOINT INVOLVING PELVIC REGION AND THIGH, UNSPECIFIED LATERALITY: ICD-10-CM

## 2023-08-03 DIAGNOSIS — M54.12 BRACHIAL NEURITIS: ICD-10-CM

## 2023-08-03 PROCEDURE — 72110 X-RAY EXAM L-2 SPINE 4/>VWS: CPT

## 2023-08-03 PROCEDURE — 72141 MRI NECK SPINE W/O DYE: CPT

## 2023-08-03 PROCEDURE — 72050 X-RAY EXAM NECK SPINE 4/5VWS: CPT

## 2023-08-03 PROCEDURE — 73521 X-RAY EXAM HIPS BI 2 VIEWS: CPT

## 2023-12-23 ENCOUNTER — APPOINTMENT (OUTPATIENT)
Facility: HOSPITAL | Age: 43
End: 2023-12-23
Payer: COMMERCIAL

## 2023-12-23 ENCOUNTER — HOSPITAL ENCOUNTER (EMERGENCY)
Facility: HOSPITAL | Age: 43
Discharge: HOME | End: 2023-12-23
Payer: COMMERCIAL

## 2023-12-23 VITALS
SYSTOLIC BLOOD PRESSURE: 125 MMHG | HEIGHT: 62 IN | BODY MASS INDEX: 27.18 KG/M2 | HEART RATE: 94 BPM | WEIGHT: 147.71 LBS | DIASTOLIC BLOOD PRESSURE: 89 MMHG | TEMPERATURE: 98.2 F | OXYGEN SATURATION: 100 % | RESPIRATION RATE: 18 BRPM

## 2023-12-23 DIAGNOSIS — M54.50 BILATERAL LOW BACK PAIN, UNSPECIFIED CHRONICITY, UNSPECIFIED WHETHER SCIATICA PRESENT: ICD-10-CM

## 2023-12-23 DIAGNOSIS — M25.521 RIGHT ELBOW PAIN: ICD-10-CM

## 2023-12-23 DIAGNOSIS — T14.8XXA MUSCULOSKELETAL STRAIN: ICD-10-CM

## 2023-12-23 DIAGNOSIS — N39.0 ACUTE UTI: ICD-10-CM

## 2023-12-23 DIAGNOSIS — N89.8 VAGINAL IRRITATION: Primary | ICD-10-CM

## 2023-12-23 LAB
ALBUMIN SERPL-MCNC: 4 G/DL (ref 3.5–5)
ALBUMIN/GLOB SERPL: 1 (ref 1.1–2.2)
ALP SERPL-CCNC: 57 U/L (ref 45–117)
ALT SERPL-CCNC: 25 U/L (ref 12–78)
ANION GAP SERPL CALC-SCNC: 5 MMOL/L (ref 5–15)
APPEARANCE UR: CLEAR
AST SERPL-CCNC: 19 U/L (ref 15–37)
BACTERIA URNS QL MICRO: ABNORMAL /HPF
BASOPHILS # BLD: 0 K/UL (ref 0–0.1)
BASOPHILS NFR BLD: 0 % (ref 0–1)
BILIRUB SERPL-MCNC: 0.6 MG/DL (ref 0.2–1)
BILIRUB UR QL: NEGATIVE
BUN SERPL-MCNC: 7 MG/DL (ref 6–20)
BUN/CREAT SERPL: 9 (ref 12–20)
CALCIUM SERPL-MCNC: 9.7 MG/DL (ref 8.5–10.1)
CHLORIDE SERPL-SCNC: 106 MMOL/L (ref 97–108)
CLUE CELLS VAG QL WET PREP: NORMAL
CO2 SERPL-SCNC: 26 MMOL/L (ref 21–32)
COLOR UR: ABNORMAL
CREAT SERPL-MCNC: 0.76 MG/DL (ref 0.55–1.02)
DIFFERENTIAL METHOD BLD: NORMAL
EOSINOPHIL # BLD: 0.2 K/UL (ref 0–0.4)
EOSINOPHIL NFR BLD: 4 % (ref 0–7)
EPITH CASTS URNS QL MICRO: ABNORMAL /LPF
ERYTHROCYTE [DISTWIDTH] IN BLOOD BY AUTOMATED COUNT: 12.9 % (ref 11.5–14.5)
GLOBULIN SER CALC-MCNC: 4.2 G/DL (ref 2–4)
GLUCOSE SERPL-MCNC: 91 MG/DL (ref 65–100)
GLUCOSE UR STRIP.AUTO-MCNC: NEGATIVE MG/DL
HCT VFR BLD AUTO: 37.6 % (ref 35–47)
HGB BLD-MCNC: 12.7 G/DL (ref 11.5–16)
HGB UR QL STRIP: NEGATIVE
HYALINE CASTS URNS QL MICRO: ABNORMAL /LPF (ref 0–2)
IMM GRANULOCYTES # BLD AUTO: 0 K/UL (ref 0–0.04)
IMM GRANULOCYTES NFR BLD AUTO: 0 % (ref 0–0.5)
KETONES UR QL STRIP.AUTO: NEGATIVE MG/DL
KOH PREP SPEC: NORMAL
LEUKOCYTE ESTERASE UR QL STRIP.AUTO: NEGATIVE
LIPASE SERPL-CCNC: 22 U/L (ref 13–75)
LYMPHOCYTES # BLD: 0.9 K/UL (ref 0.8–3.5)
LYMPHOCYTES NFR BLD: 18 % (ref 12–49)
MCH RBC QN AUTO: 31.4 PG (ref 26–34)
MCHC RBC AUTO-ENTMCNC: 33.8 G/DL (ref 30–36.5)
MCV RBC AUTO: 92.8 FL (ref 80–99)
MONOCYTES # BLD: 0.5 K/UL (ref 0–1)
MONOCYTES NFR BLD: 9 % (ref 5–13)
NEUTS SEG # BLD: 3.4 K/UL (ref 1.8–8)
NEUTS SEG NFR BLD: 69 % (ref 32–75)
NITRITE UR QL STRIP.AUTO: NEGATIVE
NRBC # BLD: 0 K/UL (ref 0–0.01)
NRBC BLD-RTO: 0 PER 100 WBC
PH UR STRIP: 5.5 (ref 5–8)
PLATELET # BLD AUTO: 241 K/UL (ref 150–400)
PMV BLD AUTO: 9.5 FL (ref 8.9–12.9)
POTASSIUM SERPL-SCNC: 3.9 MMOL/L (ref 3.5–5.1)
PROT SERPL-MCNC: 8.2 G/DL (ref 6.4–8.2)
PROT UR STRIP-MCNC: NEGATIVE MG/DL
RBC # BLD AUTO: 4.05 M/UL (ref 3.8–5.2)
RBC #/AREA URNS HPF: ABNORMAL /HPF (ref 0–5)
SERVICE CMNT-IMP: NORMAL
SODIUM SERPL-SCNC: 137 MMOL/L (ref 136–145)
SP GR UR REFRACTOMETRY: 1.02
T VAGINALIS VAG QL WET PREP: NORMAL
URINE CULTURE IF INDICATED: ABNORMAL
UROBILINOGEN UR QL STRIP.AUTO: 1 EU/DL (ref 0.2–1)
WBC # BLD AUTO: 5 K/UL (ref 3.6–11)
WBC URNS QL MICRO: ABNORMAL /HPF (ref 0–4)

## 2023-12-23 PROCEDURE — 87210 SMEAR WET MOUNT SALINE/INK: CPT

## 2023-12-23 PROCEDURE — 83690 ASSAY OF LIPASE: CPT

## 2023-12-23 PROCEDURE — 87591 N.GONORRHOEAE DNA AMP PROB: CPT

## 2023-12-23 PROCEDURE — 36415 COLL VENOUS BLD VENIPUNCTURE: CPT

## 2023-12-23 PROCEDURE — 80053 COMPREHEN METABOLIC PANEL: CPT

## 2023-12-23 PROCEDURE — 87491 CHLMYD TRACH DNA AMP PROBE: CPT

## 2023-12-23 PROCEDURE — 85025 COMPLETE CBC W/AUTO DIFF WBC: CPT

## 2023-12-23 PROCEDURE — 87086 URINE CULTURE/COLONY COUNT: CPT

## 2023-12-23 PROCEDURE — 73080 X-RAY EXAM OF ELBOW: CPT

## 2023-12-23 PROCEDURE — 99284 EMERGENCY DEPT VISIT MOD MDM: CPT

## 2023-12-23 PROCEDURE — 6370000000 HC RX 637 (ALT 250 FOR IP): Performed by: PHYSICIAN ASSISTANT

## 2023-12-23 PROCEDURE — 81001 URINALYSIS AUTO W/SCOPE: CPT

## 2023-12-23 RX ORDER — CYCLOBENZAPRINE HCL 10 MG
10 TABLET ORAL 3 TIMES DAILY PRN
Qty: 21 TABLET | Refills: 0 | Status: SHIPPED | OUTPATIENT
Start: 2023-12-23 | End: 2024-01-02

## 2023-12-23 RX ORDER — FLUCONAZOLE 150 MG/1
150 TABLET ORAL ONCE
Qty: 1 TABLET | Refills: 0 | Status: SHIPPED | OUTPATIENT
Start: 2023-12-23 | End: 2023-12-23

## 2023-12-23 RX ORDER — DICLOFENAC SODIUM 75 MG/1
75 TABLET, DELAYED RELEASE ORAL 2 TIMES DAILY
Qty: 30 TABLET | Refills: 0 | Status: SHIPPED | OUTPATIENT
Start: 2023-12-23

## 2023-12-23 RX ORDER — FLUCONAZOLE 100 MG/1
150 TABLET ORAL
Status: COMPLETED | OUTPATIENT
Start: 2023-12-23 | End: 2023-12-23

## 2023-12-23 RX ORDER — CEPHALEXIN 500 MG/1
500 CAPSULE ORAL 2 TIMES DAILY
Qty: 14 CAPSULE | Refills: 0 | Status: SHIPPED | OUTPATIENT
Start: 2023-12-23 | End: 2023-12-30

## 2023-12-23 RX ADMIN — FLUCONAZOLE 150 MG: 100 TABLET ORAL at 15:59
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
## 2023-12-23 NOTE — DISCHARGE INSTRUCTIONS
Antibiotics for UTI as prescribed  Repeat fluconazole dose for yeast as needed after you complete the dose of antibiotics  Flexeril as directed, as needed for muscle pain and spasm  Oral diclofenac (NSAID) as directed, as needed for pain and inflammation  Follow-up with OB/GYN (monitor pending results on MyChart). Follow-up with  as scheduled or sooner if needed   Return precautions as we discussed       Thank You! It was a pleasure taking care of you in our Emergency Department today. We know that when you come to [facility], you are entrusting us with your health, comfort, and safety. Our clinicians honor that trust, and truly appreciate the opportunity to care for you and your loved ones. We also value your feedback. If you receive a survey about your Emergency Department experience today, please fill it out. We care about our patients' feedback, and we listen to what you have to say. Thank you. Janie Avery PA-C    _________________________________________________  I have included a copy of your lab results and/or radiologic studies from today's visit so you can have them easily available at your follow-up visit.    Recent Results (from the past 12 hour(s))   Urinalysis with Reflex to Culture    Collection Time: 12/23/23  1:29 PM    Specimen: Urine   Result Value Ref Range    Color, UA YELLOW/STRAW      Appearance CLEAR CLEAR      Specific Gravity, UA 1.019      pH, Urine 5.5 5.0 - 8.0      Protein, UA Negative NEG mg/dL    Glucose, UA Negative NEG mg/dL    Ketones, Urine Negative NEG mg/dL    Bilirubin Urine Negative NEG      Blood, Urine Negative NEG      Urobilinogen, Urine 1.0 0.2 - 1.0 EU/dL    Nitrite, Urine Negative NEG      Leukocyte Esterase, Urine Negative NEG      Urine Culture if Indicated CULTURE NOT INDICATED BY UA RESULT      WBC, UA 0-4 0 - 4 /hpf    RBC, UA 0-5 0 - 5 /hpf    Epithelial Cells UA MANY (A) FEW /lpf    BACTERIA, URINE 2+ (A) NEG Time: 12/23/23  2:18 PM    Specimen: Miscellaneous sample   Result Value Ref Range    Clue Cells, Wet Prep CLUE CELLS ABSENT      Trich, Wet Prep NO TRICHOMONAS SEEN         XR ELBOW RIGHT (MIN 3 VIEWS)   Final Result   No acute abnormality.         [unfilled]

## 2023-12-23 NOTE — ED NOTES
DC papers reviewed with TIMMY Summers and in hand, pt verbalized understanding. Patient ambulatory out of ED with steady gait, no acute distress noted.

## 2023-12-24 LAB
BACTERIA SPEC CULT: NORMAL
C TRACH DNA SPEC QL NAA+PROBE: NEGATIVE
N GONORRHOEA DNA SPEC QL NAA+PROBE: NEGATIVE
SAMPLE TYPE: NORMAL
SERVICE CMNT-IMP: NORMAL
SERVICE CMNT-IMP: NORMAL
SPECIMEN SOURCE: NORMAL

## 2024-09-21 ENCOUNTER — APPOINTMENT (OUTPATIENT)
Facility: HOSPITAL | Age: 44
End: 2024-09-21
Payer: COMMERCIAL

## 2024-09-21 ENCOUNTER — HOSPITAL ENCOUNTER (EMERGENCY)
Facility: HOSPITAL | Age: 44
Discharge: HOME | End: 2024-09-21
Attending: STUDENT IN AN ORGANIZED HEALTH CARE EDUCATION/TRAINING PROGRAM
Payer: COMMERCIAL

## 2024-09-21 VITALS
TEMPERATURE: 97.6 F | DIASTOLIC BLOOD PRESSURE: 69 MMHG | WEIGHT: 146.61 LBS | BODY MASS INDEX: 26.81 KG/M2 | OXYGEN SATURATION: 93 % | HEART RATE: 94 BPM | RESPIRATION RATE: 26 BRPM | SYSTOLIC BLOOD PRESSURE: 104 MMHG

## 2024-09-21 DIAGNOSIS — R07.9 CHEST PAIN, UNSPECIFIED TYPE: ICD-10-CM

## 2024-09-21 DIAGNOSIS — E83.42 HYPOMAGNESEMIA: ICD-10-CM

## 2024-09-21 DIAGNOSIS — G43.809 OTHER MIGRAINE WITHOUT STATUS MIGRAINOSUS, NOT INTRACTABLE: ICD-10-CM

## 2024-09-21 DIAGNOSIS — J45.21 MILD INTERMITTENT REACTIVE AIRWAY DISEASE WITH ACUTE EXACERBATION: Primary | ICD-10-CM

## 2024-09-21 LAB
ALBUMIN SERPL-MCNC: 3.6 G/DL (ref 3.5–5)
ALBUMIN/GLOB SERPL: 0.9 (ref 1.1–2.2)
ALP SERPL-CCNC: 59 U/L (ref 45–117)
ALT SERPL-CCNC: 28 U/L (ref 12–78)
ANION GAP SERPL CALC-SCNC: 3 MMOL/L (ref 2–12)
APPEARANCE UR: CLEAR
AST SERPL-CCNC: 26 U/L (ref 15–37)
BACTERIA URNS QL MICRO: NEGATIVE /HPF
BASOPHILS # BLD: 0 K/UL (ref 0–0.1)
BASOPHILS NFR BLD: 1 % (ref 0–1)
BILIRUB SERPL-MCNC: <0.1 MG/DL (ref 0.2–1)
BILIRUB UR QL: NEGATIVE
BUN SERPL-MCNC: 6 MG/DL (ref 6–20)
BUN/CREAT SERPL: 8 (ref 12–20)
CALCIUM SERPL-MCNC: 8.5 MG/DL (ref 8.5–10.1)
CHLORIDE SERPL-SCNC: 109 MMOL/L (ref 97–108)
CO2 SERPL-SCNC: 26 MMOL/L (ref 21–32)
COLOR UR: NORMAL
COMMENT:: NORMAL
CREAT SERPL-MCNC: 0.71 MG/DL (ref 0.55–1.02)
DIFFERENTIAL METHOD BLD: NORMAL
EOSINOPHIL # BLD: 0.2 K/UL (ref 0–0.4)
EOSINOPHIL NFR BLD: 6 % (ref 0–7)
EPITH CASTS URNS QL MICRO: NORMAL /LPF
ERYTHROCYTE [DISTWIDTH] IN BLOOD BY AUTOMATED COUNT: 13.2 % (ref 11.5–14.5)
FLUAV RNA SPEC QL NAA+PROBE: NOT DETECTED
FLUBV RNA SPEC QL NAA+PROBE: NOT DETECTED
GLOBULIN SER CALC-MCNC: 4.2 G/DL (ref 2–4)
GLUCOSE SERPL-MCNC: 84 MG/DL (ref 65–100)
GLUCOSE UR STRIP.AUTO-MCNC: NEGATIVE MG/DL
HCT VFR BLD AUTO: 36.3 % (ref 35–47)
HGB BLD-MCNC: 12 G/DL (ref 11.5–16)
HGB UR QL STRIP: NEGATIVE
HYALINE CASTS URNS QL MICRO: NORMAL /LPF (ref 0–5)
IMM GRANULOCYTES # BLD AUTO: 0 K/UL (ref 0–0.04)
IMM GRANULOCYTES NFR BLD AUTO: 0 % (ref 0–0.5)
KETONES UR QL STRIP.AUTO: NEGATIVE MG/DL
LACTATE SERPL-SCNC: 0.7 MMOL/L (ref 0.4–2)
LACTATE SERPL-SCNC: 2 MMOL/L (ref 0.4–2)
LEUKOCYTE ESTERASE UR QL STRIP.AUTO: NEGATIVE
LYMPHOCYTES # BLD: 0.9 K/UL (ref 0.8–3.5)
LYMPHOCYTES NFR BLD: 26 % (ref 12–49)
MAGNESIUM SERPL-MCNC: 1.9 MG/DL (ref 1.6–2.4)
MCH RBC QN AUTO: 30.5 PG (ref 26–34)
MCHC RBC AUTO-ENTMCNC: 33.1 G/DL (ref 30–36.5)
MCV RBC AUTO: 92.4 FL (ref 80–99)
MONOCYTES # BLD: 0.4 K/UL (ref 0–1)
MONOCYTES NFR BLD: 12 % (ref 5–13)
NEUTS SEG # BLD: 2 K/UL (ref 1.8–8)
NEUTS SEG NFR BLD: 55 % (ref 32–75)
NITRITE UR QL STRIP.AUTO: NEGATIVE
NRBC # BLD: 0 K/UL (ref 0–0.01)
NRBC BLD-RTO: 0 PER 100 WBC
NT PRO BNP: 23 PG/ML
PH UR STRIP: 5.5 (ref 5–8)
PLATELET # BLD AUTO: 260 K/UL (ref 150–400)
PMV BLD AUTO: 9.6 FL (ref 8.9–12.9)
POTASSIUM SERPL-SCNC: 4.2 MMOL/L (ref 3.5–5.1)
PROT SERPL-MCNC: 7.8 G/DL (ref 6.4–8.2)
PROT UR STRIP-MCNC: NEGATIVE MG/DL
RBC # BLD AUTO: 3.93 M/UL (ref 3.8–5.2)
RBC #/AREA URNS HPF: NORMAL /HPF (ref 0–5)
SARS-COV-2 RNA RESP QL NAA+PROBE: NOT DETECTED
SODIUM SERPL-SCNC: 138 MMOL/L (ref 136–145)
SOURCE: NORMAL
SP GR UR REFRACTOMETRY: 1.01 (ref 1–1.03)
SPECIMEN HOLD: NORMAL
SPECIMEN HOLD: NORMAL
TROPONIN I SERPL HS-MCNC: 13 NG/L (ref 0–51)
TROPONIN I SERPL HS-MCNC: 13 NG/L (ref 0–51)
UROBILINOGEN UR QL STRIP.AUTO: 0.2 EU/DL (ref 0.2–1)
WBC # BLD AUTO: 3.6 K/UL (ref 3.6–11)
WBC URNS QL MICRO: NORMAL /HPF (ref 0–4)

## 2024-09-21 PROCEDURE — 83880 ASSAY OF NATRIURETIC PEPTIDE: CPT

## 2024-09-21 PROCEDURE — 85025 COMPLETE CBC W/AUTO DIFF WBC: CPT

## 2024-09-21 PROCEDURE — 6360000002 HC RX W HCPCS: Performed by: STUDENT IN AN ORGANIZED HEALTH CARE EDUCATION/TRAINING PROGRAM

## 2024-09-21 PROCEDURE — 99285 EMERGENCY DEPT VISIT HI MDM: CPT

## 2024-09-21 PROCEDURE — 83735 ASSAY OF MAGNESIUM: CPT

## 2024-09-21 PROCEDURE — 6370000000 HC RX 637 (ALT 250 FOR IP): Performed by: STUDENT IN AN ORGANIZED HEALTH CARE EDUCATION/TRAINING PROGRAM

## 2024-09-21 PROCEDURE — 96375 TX/PRO/DX INJ NEW DRUG ADDON: CPT

## 2024-09-21 PROCEDURE — 36415 COLL VENOUS BLD VENIPUNCTURE: CPT

## 2024-09-21 PROCEDURE — 96374 THER/PROPH/DIAG INJ IV PUSH: CPT

## 2024-09-21 PROCEDURE — 80053 COMPREHEN METABOLIC PANEL: CPT

## 2024-09-21 PROCEDURE — 87636 SARSCOV2 & INF A&B AMP PRB: CPT

## 2024-09-21 PROCEDURE — 71046 X-RAY EXAM CHEST 2 VIEWS: CPT

## 2024-09-21 PROCEDURE — 6370000000 HC RX 637 (ALT 250 FOR IP)

## 2024-09-21 PROCEDURE — 81001 URINALYSIS AUTO W/SCOPE: CPT

## 2024-09-21 PROCEDURE — 93005 ELECTROCARDIOGRAM TRACING: CPT | Performed by: STUDENT IN AN ORGANIZED HEALTH CARE EDUCATION/TRAINING PROGRAM

## 2024-09-21 PROCEDURE — 84484 ASSAY OF TROPONIN QUANT: CPT

## 2024-09-21 PROCEDURE — 94640 AIRWAY INHALATION TREATMENT: CPT

## 2024-09-21 PROCEDURE — 83605 ASSAY OF LACTIC ACID: CPT

## 2024-09-21 PROCEDURE — 2580000003 HC RX 258: Performed by: STUDENT IN AN ORGANIZED HEALTH CARE EDUCATION/TRAINING PROGRAM

## 2024-09-21 RX ORDER — MAGNESIUM SULFATE IN WATER 40 MG/ML
2000 INJECTION, SOLUTION INTRAVENOUS ONCE
Status: DISCONTINUED | OUTPATIENT
Start: 2024-09-21 | End: 2024-09-21 | Stop reason: HOSPADM

## 2024-09-21 RX ORDER — IPRATROPIUM BROMIDE AND ALBUTEROL SULFATE 2.5; .5 MG/3ML; MG/3ML
2 SOLUTION RESPIRATORY (INHALATION)
Status: COMPLETED | OUTPATIENT
Start: 2024-09-21 | End: 2024-09-21

## 2024-09-21 RX ORDER — ACETAMINOPHEN 500 MG
1000 TABLET ORAL ONCE
Status: COMPLETED | OUTPATIENT
Start: 2024-09-21 | End: 2024-09-21

## 2024-09-21 RX ORDER — FLUTICASONE PROPIONATE 50 MCG
2 SPRAY, SUSPENSION (ML) NASAL DAILY
Qty: 16 G | Refills: 0 | Status: SHIPPED | OUTPATIENT
Start: 2024-09-21

## 2024-09-21 RX ORDER — IPRATROPIUM BROMIDE AND ALBUTEROL SULFATE 2.5; .5 MG/3ML; MG/3ML
SOLUTION RESPIRATORY (INHALATION)
Status: COMPLETED
Start: 2024-09-21 | End: 2024-09-21

## 2024-09-21 RX ORDER — ALBUTEROL SULFATE 90 UG/1
2 INHALANT RESPIRATORY (INHALATION) 4 TIMES DAILY PRN
Qty: 18 G | Refills: 0 | Status: SHIPPED | OUTPATIENT
Start: 2024-09-21

## 2024-09-21 RX ORDER — DIPHENHYDRAMINE HYDROCHLORIDE 50 MG/ML
25 INJECTION INTRAMUSCULAR; INTRAVENOUS ONCE
Status: COMPLETED | OUTPATIENT
Start: 2024-09-21 | End: 2024-09-21

## 2024-09-21 RX ORDER — PREDNISONE 20 MG/1
40 TABLET ORAL DAILY
Qty: 8 TABLET | Refills: 0 | Status: SHIPPED | OUTPATIENT
Start: 2024-09-21 | End: 2024-09-25

## 2024-09-21 RX ORDER — ONDANSETRON 2 MG/ML
4 INJECTION INTRAMUSCULAR; INTRAVENOUS ONCE
Status: COMPLETED | OUTPATIENT
Start: 2024-09-21 | End: 2024-09-21

## 2024-09-21 RX ORDER — SODIUM CHLORIDE, SODIUM LACTATE, POTASSIUM CHLORIDE, AND CALCIUM CHLORIDE .6; .31; .03; .02 G/100ML; G/100ML; G/100ML; G/100ML
1000 INJECTION, SOLUTION INTRAVENOUS
Status: COMPLETED | OUTPATIENT
Start: 2024-09-21 | End: 2024-09-21

## 2024-09-21 RX ADMIN — SODIUM CHLORIDE, POTASSIUM CHLORIDE, SODIUM LACTATE AND CALCIUM CHLORIDE 1000 ML: 600; 310; 30; 20 INJECTION, SOLUTION INTRAVENOUS at 13:34

## 2024-09-21 RX ADMIN — ACETAMINOPHEN 1000 MG: 500 TABLET ORAL at 15:08

## 2024-09-21 RX ADMIN — DIPHENHYDRAMINE HYDROCHLORIDE 25 MG: 50 INJECTION INTRAMUSCULAR; INTRAVENOUS at 15:07

## 2024-09-21 RX ADMIN — WATER 125 MG: 1 INJECTION INTRAMUSCULAR; INTRAVENOUS; SUBCUTANEOUS at 13:33

## 2024-09-21 RX ADMIN — ONDANSETRON 4 MG: 2 INJECTION INTRAMUSCULAR; INTRAVENOUS at 13:33

## 2024-09-21 RX ADMIN — IPRATROPIUM BROMIDE AND ALBUTEROL SULFATE 2 DOSE: 2.5; .5 SOLUTION RESPIRATORY (INHALATION) at 15:10

## 2024-09-21 RX ADMIN — IPRATROPIUM BROMIDE AND ALBUTEROL SULFATE 2 DOSE: .5; 3 SOLUTION RESPIRATORY (INHALATION) at 15:10

## 2024-09-21 ASSESSMENT — HEART SCORE: ECG: NORMAL

## 2024-09-22 LAB
EKG ATRIAL RATE: 66 BPM
EKG DIAGNOSIS: NORMAL
EKG P AXIS: 38 DEGREES
EKG P-R INTERVAL: 124 MS
EKG Q-T INTERVAL: 420 MS
EKG QRS DURATION: 80 MS
EKG QTC CALCULATION (BAZETT): 440 MS
EKG R AXIS: 68 DEGREES
EKG T AXIS: 63 DEGREES
EKG VENTRICULAR RATE: 66 BPM

## 2024-10-14 ENCOUNTER — HOSPITAL ENCOUNTER (EMERGENCY)
Facility: HOSPITAL | Age: 44
Discharge: HOME | End: 2024-10-14
Attending: STUDENT IN AN ORGANIZED HEALTH CARE EDUCATION/TRAINING PROGRAM

## 2024-10-14 ENCOUNTER — APPOINTMENT (OUTPATIENT)
Facility: HOSPITAL | Age: 44
End: 2024-10-14

## 2024-10-14 VITALS
DIASTOLIC BLOOD PRESSURE: 76 MMHG | SYSTOLIC BLOOD PRESSURE: 113 MMHG | WEIGHT: 141 LBS | RESPIRATION RATE: 21 BRPM | OXYGEN SATURATION: 100 % | BODY MASS INDEX: 25.79 KG/M2 | TEMPERATURE: 98.1 F | HEART RATE: 75 BPM

## 2024-10-14 DIAGNOSIS — R07.9 CHEST PAIN, UNSPECIFIED TYPE: Primary | ICD-10-CM

## 2024-10-14 LAB
ALBUMIN SERPL-MCNC: 3.6 G/DL (ref 3.5–5)
ALBUMIN/GLOB SERPL: 0.9 (ref 1.1–2.2)
ALP SERPL-CCNC: 58 U/L (ref 45–117)
ALT SERPL-CCNC: 34 U/L (ref 12–78)
ANION GAP SERPL CALC-SCNC: 4 MMOL/L (ref 2–12)
AST SERPL-CCNC: 25 U/L (ref 15–37)
BASOPHILS # BLD: 0 K/UL (ref 0–0.1)
BASOPHILS NFR BLD: 0 % (ref 0–1)
BILIRUB SERPL-MCNC: 0.3 MG/DL (ref 0.2–1)
BUN SERPL-MCNC: 9 MG/DL (ref 6–20)
BUN/CREAT SERPL: 13 (ref 12–20)
CALCIUM SERPL-MCNC: 9.3 MG/DL (ref 8.5–10.1)
CHLORIDE SERPL-SCNC: 108 MMOL/L (ref 97–108)
CO2 SERPL-SCNC: 24 MMOL/L (ref 21–32)
CREAT SERPL-MCNC: 0.7 MG/DL (ref 0.55–1.02)
DIFFERENTIAL METHOD BLD: ABNORMAL
EKG ATRIAL RATE: 77 BPM
EKG DIAGNOSIS: NORMAL
EKG P AXIS: 57 DEGREES
EKG P-R INTERVAL: 114 MS
EKG Q-T INTERVAL: 382 MS
EKG QRS DURATION: 70 MS
EKG QTC CALCULATION (BAZETT): 432 MS
EKG R AXIS: 41 DEGREES
EKG T AXIS: 53 DEGREES
EKG VENTRICULAR RATE: 77 BPM
EOSINOPHIL # BLD: 0.2 K/UL (ref 0–0.4)
EOSINOPHIL NFR BLD: 5 % (ref 0–7)
ERYTHROCYTE [DISTWIDTH] IN BLOOD BY AUTOMATED COUNT: 12.9 % (ref 11.5–14.5)
GLOBULIN SER CALC-MCNC: 3.9 G/DL (ref 2–4)
GLUCOSE SERPL-MCNC: 106 MG/DL (ref 65–100)
HCT VFR BLD AUTO: 36.1 % (ref 35–47)
HGB BLD-MCNC: 11.8 G/DL (ref 11.5–16)
IMM GRANULOCYTES # BLD AUTO: 0 K/UL (ref 0–0.04)
IMM GRANULOCYTES NFR BLD AUTO: 1 % (ref 0–0.5)
LYMPHOCYTES # BLD: 0.7 K/UL (ref 0.8–3.5)
LYMPHOCYTES NFR BLD: 16 % (ref 12–49)
MCH RBC QN AUTO: 30.5 PG (ref 26–34)
MCHC RBC AUTO-ENTMCNC: 32.7 G/DL (ref 30–36.5)
MCV RBC AUTO: 93.3 FL (ref 80–99)
MONOCYTES # BLD: 0.5 K/UL (ref 0–1)
MONOCYTES NFR BLD: 12 % (ref 5–13)
NEUTS SEG # BLD: 2.8 K/UL (ref 1.8–8)
NEUTS SEG NFR BLD: 66 % (ref 32–75)
NRBC # BLD: 0 K/UL (ref 0–0.01)
NRBC BLD-RTO: 0 PER 100 WBC
PLATELET # BLD AUTO: 187 K/UL (ref 150–400)
PMV BLD AUTO: 10.2 FL (ref 8.9–12.9)
POTASSIUM SERPL-SCNC: 4.2 MMOL/L (ref 3.5–5.1)
PROT SERPL-MCNC: 7.5 G/DL (ref 6.4–8.2)
RBC # BLD AUTO: 3.87 M/UL (ref 3.8–5.2)
RBC MORPH BLD: ABNORMAL
SODIUM SERPL-SCNC: 136 MMOL/L (ref 136–145)
TROPONIN I SERPL HS-MCNC: 21 NG/L (ref 0–51)
TROPONIN I SERPL HS-MCNC: 33 NG/L (ref 0–51)
WBC # BLD AUTO: 4.2 K/UL (ref 3.6–11)

## 2024-10-14 PROCEDURE — 36415 COLL VENOUS BLD VENIPUNCTURE: CPT

## 2024-10-14 PROCEDURE — 96374 THER/PROPH/DIAG INJ IV PUSH: CPT

## 2024-10-14 PROCEDURE — 85025 COMPLETE CBC W/AUTO DIFF WBC: CPT

## 2024-10-14 PROCEDURE — 71046 X-RAY EXAM CHEST 2 VIEWS: CPT

## 2024-10-14 PROCEDURE — 93005 ELECTROCARDIOGRAM TRACING: CPT

## 2024-10-14 PROCEDURE — 84484 ASSAY OF TROPONIN QUANT: CPT

## 2024-10-14 PROCEDURE — 99285 EMERGENCY DEPT VISIT HI MDM: CPT

## 2024-10-14 PROCEDURE — 6370000000 HC RX 637 (ALT 250 FOR IP): Performed by: STUDENT IN AN ORGANIZED HEALTH CARE EDUCATION/TRAINING PROGRAM

## 2024-10-14 PROCEDURE — 80053 COMPREHEN METABOLIC PANEL: CPT

## 2024-10-14 PROCEDURE — 6360000002 HC RX W HCPCS: Performed by: STUDENT IN AN ORGANIZED HEALTH CARE EDUCATION/TRAINING PROGRAM

## 2024-10-14 PROCEDURE — 96375 TX/PRO/DX INJ NEW DRUG ADDON: CPT

## 2024-10-14 RX ORDER — PROCHLORPERAZINE EDISYLATE 5 MG/ML
10 INJECTION INTRAMUSCULAR; INTRAVENOUS ONCE
Status: COMPLETED | OUTPATIENT
Start: 2024-10-14 | End: 2024-10-14

## 2024-10-14 RX ORDER — DIPHENHYDRAMINE HYDROCHLORIDE 50 MG/ML
25 INJECTION INTRAMUSCULAR; INTRAVENOUS
Status: COMPLETED | OUTPATIENT
Start: 2024-10-14 | End: 2024-10-14

## 2024-10-14 RX ORDER — FAMOTIDINE 20 MG/1
20 TABLET, FILM COATED ORAL DAILY
Qty: 20 TABLET | Refills: 0 | Status: SHIPPED | OUTPATIENT
Start: 2024-10-14

## 2024-10-14 RX ORDER — NITROGLYCERIN 0.4 MG/1
0.4 TABLET SUBLINGUAL ONCE
Status: COMPLETED | OUTPATIENT
Start: 2024-10-14 | End: 2024-10-14

## 2024-10-14 RX ADMIN — DIPHENHYDRAMINE HYDROCHLORIDE 25 MG: 50 INJECTION INTRAMUSCULAR; INTRAVENOUS at 07:16

## 2024-10-14 RX ADMIN — PROCHLORPERAZINE EDISYLATE 10 MG: 5 INJECTION INTRAMUSCULAR; INTRAVENOUS at 07:19

## 2024-10-14 RX ADMIN — NITROGLYCERIN 0.4 MG: 0.4 TABLET SUBLINGUAL at 06:57

## 2024-10-14 ASSESSMENT — PAIN DESCRIPTION - LOCATION: LOCATION: CHEST

## 2024-10-14 ASSESSMENT — PAIN SCALES - GENERAL: PAINLEVEL_OUTOF10: 8

## 2024-10-14 NOTE — ED PROVIDER NOTES
EMERGENCY DEPARTMENT HISTORY AND PHYSICAL EXAM      Date: 10/14/2024  Patient Name: Veda Campbell    History of Presenting Illness     Chief Complaint   Patient presents with    Chest Pain     Pt via EMS for complaint of CP starting at 3am. Pt reports sharp pain on left/center chest. Pt reports this CP has been going on for 2 weeks, but this morning it was more painful.         HPI: History From: patient, History limited by: none  Veda Campbell, 43 y.o. female presents to the ED with cc of chest pain.  She describes as a sharp left-sided chest pain, and has been present for over 2 weeks.  This morning, she describes more of a squeezing central chest pain as well.  She has been having some shortness of breath on and off.  She also reports nausea and vomiting and some sweats.  No measured fevers.  She denies unilateral leg pain or leg swelling, no exaggerated is estrogen use or prolonged periods of immobilization.  She received aspirin prior to arrival.  She denies history of coronary artery disease.  She reports occasional tobacco.  Reports coronary artery disease in her mother.  She recently finished a course of prednisone, she has a history of sarcoid.          There are no other complaints, changes, or physical findings at this time.    PCP: None, None    No current facility-administered medications on file prior to encounter.     Current Outpatient Medications on File Prior to Encounter   Medication Sig Dispense Refill    albuterol sulfate HFA (VENTOLIN HFA) 108 (90 Base) MCG/ACT inhaler Inhale 2 puffs into the lungs 4 times daily as needed for Wheezing 18 g 0    fluticasone (FLONASE) 50 MCG/ACT nasal spray 2 sprays by Each Nostril route daily 16 g 0    diclofenac (VOLTAREN) 75 MG EC tablet Take 1 tablet by mouth 2 times daily 30 tablet 0    albuterol sulfate HFA (PROVENTIL;VENTOLIN;PROAIR) 108 (90 Base) MCG/ACT inhaler Inhale 2 puffs into the lungs every 4 hours as needed      ascorbic acid (VITAMIN C)

## 2024-10-14 NOTE — ED NOTES
Assumed care of pt at this time. Pt via Ems for CP since 3am. Pt stated she has been having this CP for 2 weeks but this morning it felt worse. EMS gave asa PTA. Pt also reported she believes she is experiencing mold toxicity from her job. Pt hooked up on monitor x3 and resting in be with no other needs at this time.

## 2024-10-14 NOTE — ED NOTES
TRANSFER - IN REPORT:    Verbal report received from Sofia (name) on Veda NEERAJ Adrian.  Report consisted of patient’s Situation, Background, Assessment and   Recommendations(SBAR).   Information from the following report(s) SBAR and ED Summary was reviewed with the receiving nurse.  Opportunity for questions and clarification was provided.      Pt resting in stretcher, reporting improved CP but persistent HA

## (undated) DEVICE — PACK,LITHOTOMY,PK I: Brand: MEDLINE

## (undated) DEVICE — SOLUTION IV 1000ML 0.9% SOD CHL

## (undated) DEVICE — PREP PAD BNS: Brand: CONVERTORS

## (undated) DEVICE — ELECTRODE ARTHSCP W10MM D10MM SHFT 11CM YEL MPLR LOOP W/

## (undated) DEVICE — PAD,SANITARY,11 IN,MAXI,N-STRL,IND WRAP: Brand: MEDLINE

## (undated) DEVICE — STERILE POLYISOPRENE POWDER-FREE SURGICAL GLOVES: Brand: PROTEXIS

## (undated) DEVICE — TUBING, SUCTION, 1/4" X 10', STRAIGHT: Brand: MEDLINE

## (undated) DEVICE — SUT SLK 2-0SH 30IN BLK --

## (undated) DEVICE — STRAP,POSITIONING,KNEE/BODY,FOAM,4X60": Brand: MEDLINE

## (undated) DEVICE — INFECTION CONTROL KIT SYS

## (undated) DEVICE — ELECTRODE ARTHSCP 25X8MM SHFT 11CM MPLR LOOP BRN DISP W/

## (undated) DEVICE — REM POLYHESIVE ADULT PATIENT RETURN ELECTRODE: Brand: VALLEYLAB

## (undated) DEVICE — SWAB MEDICATED 8 IN PROCTO

## (undated) DEVICE — Device

## (undated) DEVICE — ELECTRODE ES L11CM DIA5MM BALL SHFT RED DISP UTAHLOOP

## (undated) DEVICE — COVER LT HNDL PLAS RIG 1 PER PK

## (undated) DEVICE — STERILE POLYISOPRENE POWDER-FREE SURGICAL GLOVES WITH EMOLLIENT COATING: Brand: PROTEXIS

## (undated) DEVICE — CATHETER,URETHRAL,REDRUBBER,STRL,14FR: Brand: MEDLINE INDUSTRIES, INC.

## (undated) DEVICE — SYR 10ML LUER LOK 1/5ML GRAD --

## (undated) DEVICE — NEEDLE SPNL 22GA L3.5IN BLK HUB S STL REG WALL FIT STYL W/

## (undated) DEVICE — YANKAUER,BULB TIP,W/O VENT,RIGID,STERILE: Brand: MEDLINE